# Patient Record
Sex: FEMALE | Race: WHITE | Employment: OTHER | URBAN - METROPOLITAN AREA
[De-identification: names, ages, dates, MRNs, and addresses within clinical notes are randomized per-mention and may not be internally consistent; named-entity substitution may affect disease eponyms.]

---

## 2017-06-06 ENCOUNTER — HOSPITAL ENCOUNTER (OUTPATIENT)
Dept: MRI IMAGING | Age: 82
Discharge: HOME | End: 2017-06-06
Attending: FAMILY MEDICINE
Payer: MEDICARE

## 2017-06-06 DIAGNOSIS — M54.5 CHRONIC RIGHT-SIDED LOW BACK PAIN, WITH SCIATICA PRESENCE UNSPECIFIED: ICD-10-CM

## 2017-06-06 DIAGNOSIS — G89.29 CHRONIC RIGHT-SIDED LOW BACK PAIN, WITH SCIATICA PRESENCE UNSPECIFIED: ICD-10-CM

## 2017-06-06 PROCEDURE — 72148 MRI LUMBAR SPINE W/O DYE: CPT

## 2017-06-08 NOTE — PROGRESS NOTES
Notified results, verbalizes understanding. Has been receiving injections from Dr. Delia Santacruz, but these are no longer helping. Per Dr. Ijeoma Valdes: Олег Ojeda second opinion with Dr Dolores Del Valle or [facility]. Referral ordered.

## 2017-06-08 NOTE — PROGRESS NOTES
Per Dr. Billie Nageotte: Notify lots of arthritis and narrowing that pinches nerve. Follow up with POA. [facility]

## 2017-08-03 PROBLEM — M54.41 CHRONIC RIGHT-SIDED LOW BACK PAIN WITH RIGHT-SIDED SCIATICA: Status: ACTIVE | Noted: 2017-08-03

## 2017-08-03 PROBLEM — M47.817 FACET ARTHROPATHY, LUMBOSACRAL: Status: ACTIVE | Noted: 2017-08-03

## 2017-08-03 PROBLEM — M54.16 BILATERAL LUMBAR RADICULOPATHY: Status: ACTIVE | Noted: 2017-08-03

## 2017-08-03 PROBLEM — G89.29 CHRONIC RIGHT-SIDED LOW BACK PAIN WITH RIGHT-SIDED SCIATICA: Status: ACTIVE | Noted: 2017-08-03

## 2017-08-03 PROBLEM — I73.9 CLAUDICATION (HCC): Status: ACTIVE | Noted: 2017-08-03

## 2017-08-08 ENCOUNTER — HOSPITAL ENCOUNTER (OUTPATIENT)
Dept: PHYSICAL THERAPY | Age: 82
Discharge: HOME | End: 2017-08-08
Attending: HOSPITALIST
Payer: MEDICARE

## 2017-08-08 DIAGNOSIS — M47.817 FACET ARTHROPATHY, LUMBOSACRAL: ICD-10-CM

## 2017-08-08 DIAGNOSIS — M54.16 BILATERAL LUMBAR RADICULOPATHY: ICD-10-CM

## 2017-08-08 DIAGNOSIS — M54.41 CHRONIC RIGHT-SIDED LOW BACK PAIN WITH RIGHT-SIDED SCIATICA: ICD-10-CM

## 2017-08-08 DIAGNOSIS — I73.9 CLAUDICATION (HCC): ICD-10-CM

## 2017-08-08 DIAGNOSIS — G89.29 CHRONIC RIGHT-SIDED LOW BACK PAIN WITH RIGHT-SIDED SCIATICA: ICD-10-CM

## 2017-08-08 PROCEDURE — 97162 PT EVAL MOD COMPLEX 30 MIN: CPT

## 2017-08-08 PROCEDURE — G8978 MOBILITY CURRENT STATUS: HCPCS

## 2017-08-08 PROCEDURE — 97110 THERAPEUTIC EXERCISES: CPT

## 2017-08-08 PROCEDURE — G8979 MOBILITY GOAL STATUS: HCPCS

## 2017-08-08 NOTE — PROGRESS NOTES
Ambulatory/Rehab Services H2 Model Falls Risk Assessment    Risk Factor Pts. ·   Confusion/Disorientation/Impulsivity  []    4 ·   Symptomatic Depression  []   2 ·   Altered Elimination  []   1 ·   Dizziness/Vertigo  []   1 ·   Gender (Male)  []   1 ·   Any administered antiepileptics (anticonvulsants):  []   2 ·   Any administered benzodiazepines:  []   1 ·   Visual Impairment (specify):  []   1 ·   Portable Oxygen Use  []   1 ·   Orthostatic ? BP  []   1 ·   History of Recent Falls (within 3 mos.)  []   5     Ability to Rise from Chair (choose one) Pts. ·   Ability to rise in a single movement  []   0 ·   Pushes up, successful in one attempt  []   1 ·   Multiple attempts, but successful  [x]   3 ·   Unable to rise without assistance  []   4   Total: (5 or greater = High Risk) 3     Falls Prevention Plan:   []                Physical Limitations to Exercise (specify):   []                Mobility Assistance Device (type):   []                Exercise/Equipment Adaptation (specify):    ©2010 [facility] of [address] Patent #[id number].  Federal Law prohibits the replication, distribution or use without written permission from [facility] [employer]

## 2017-08-08 NOTE — PROGRESS NOTES
Therapy Center at [facility] Therapy   [address], [address]  [phone]   [phone]    Pato Escobar  : 1922       OUTPATIENT PHYSICAL THERAPY:Initial Assessment 2017    ICD-10: Treatment Diagnosis: difficulty walking R 26.2, back pain M54.5  Precautions/Allergies:   Latex, natural rubber   Fall Risk Score: 3 (? 5 = High Risk)  MD Orders: Eval and treat MEDICAL/REFERRING DIAGNOSIS:  Bilateral lumbar radiculopathy [M54.16]  Claudication ([address] [I73.9]  Chronic right-sided low back pain with right-sided sciatica [M54.41, G89.29]  Facet arthropathy, lumbosacral [M12.88]   DATE OF ONSET: chronic  REFERRING PHYSICIAN: Dewain Simmonds, MD  RETURN PHYSICIAN APPOINTMENT: as needed     INITIAL ASSESSMENT:  Ms. Alpesh Moreno presents with chronic back pain due to  Scoliosis, DDD and spondylosis. She has had difficulty with her back for years and has received prior injections by Dr. Kathy Cid which have helped for a while. She does report her pain can extend into her legs and she has had worsening time with her walking and balance. Dr. Chaka Anderson has instructed pt to begin using a cane in order to decrease her fall risk. Pt would like to work on her overall strength and balance so she feels more comfortable with standing and walking and to help improve her overall mobility. PROBLEM LIST (Impacting functional limitations):  1. Decreased Strength  2. Decreased ADL/Functional Activities  3. Decreased Ambulation Ability/Technique  4. Decreased Balance  5. Increased Pain  6. Decreased Activity Tolerance  7. Decreased Flexibility/Joint Mobility INTERVENTIONS PLANNED:  1. Balance Exercise  2. Gait Training  3. Heat  4. Home Exercise Program (HEP)  5. Range of Motion (ROM)  6. Therapeutic Exercise/Strengthening   TREATMENT PLAN:  Effective Dates: 17 TO 10/17/17.   Frequency/Duration: 2 times a week for 10 weeks and upon reassessment,  will adjust frequency and duration as progress indicates. GOALS: (Goals have been discussed and agreed upon with patient.)  Short-Term Functional Goals: Time Frame: 5 weeks  1. Establish independent HEP with no cueing. 2. Pt will be able to report zero falls. 3. Pt will increase strength by 1/2 grade in order to rise from chair with more ease and less attempts. Discharge Goals: Time Frame: 10 weeks  1. Improve score on Oswestry by at least 5-8 points for improved ADL function. 2. Pt will be able to demonstrate improved gait mechanics with use of cane and proper use of cane. 3. Pt will be able to maintain a more erect standing or sitting posture to limit kyphosis. 4. Pt will be able to walk for at least 10 minutes prior to sitting down. Rehabilitation Potential For Stated Goals: [address] therapy, I certify that the treatment plan above will be carried out by a therapist or under their direction. Thank you for this referral,  Jovany Carrera PT     Referring Physician Signature: Randall Alexander MD              Date                    The information in this section was collected on 8/8/17 (except where otherwise noted). HISTORY:   History of Present Injury/Illness (Reason for Referral):  Ms. Amanuel Wayne , 80years of age, presents with chronic back pain due to  Scoliosis, DDD and spondylosis. She has had difficulty with her back for years and has received prior injections by Dr. Chandra Cowden which have helped for a while. She does report her pain can extend into her legs and she has had worsening time with her walking and balance. Dr. Tequila Hernandez has instructed pt to begin using a cane in order to decrease her fall risk. Pt would like to work on her overall strength and balance so she feels more comfortable with standing and walking and to help improve her overall mobility. Past Medical History/Comorbidities:   Ms. Amanuel Wayne  has a past medical history of Anxiety;  Arthritis; Carotid artery stenosis; Depression; [place] (hard of hearing); Hypoglycemia; Leg fracture, left (2009); PUD (peptic ulcer disease); Venous insufficiency; Venous stasis of lower extremity; and Venous ulcer of leg ([facility] [address] Ms. Kristen Lama  has a past surgical history that includes colonoscopy (2008); cataract removal (Bilateral); tonsillectomy; total knee arthroplasty (Left, 2000); total hip arthroplasty (Right, 2008); and orthopaedic (Left, 2009). Social History/Living Environment:     Pt lives independently in a one level home. She continues to drive  Prior Level of Function/Work/Activity:  Pt is retired. Dominant Side:         RIGHT    Current Medications:       Current Outpatient Prescriptions:     gabapentin (NEURONTIN) 100 mg capsule, Take 1 Cap by mouth See Admin Instructions. Take 1 tablet Po QHS for 1 week. If no excess sedation then increase to 2 tablets PO QHS. , Disp: 60 Cap, Rfl: 3    cyanocobalamin 1,000 mcg tablet, Take 1,000 mcg by mouth daily. , Disp: , Rfl:     meloxicam (MOBIC) 7.5 mg tablet, Take 1-2 Tabs by mouth daily as needed for Pain., Disp: 60 Tab, Rfl: 5    escitalopram oxalate (LEXAPRO) 10 mg tablet, Take 1 Tab by mouth daily. , Disp: 60 Tab, Rfl: 5    CALCIUM POLYCARBOPHIL (FIBERCON PO), Take  by mouth., Disp: , Rfl:     Bifidobacterium Infantis (ALIGN) 4 mg cap, Take  by mouth., Disp: , Rfl:     vitamin a-vitamin c-vit e-min (OCUVITE) tablet, Take 1 Tab by mouth daily. , Disp: , Rfl:     aspirin delayed-release 81 mg tablet, Take 81 mg by mouth daily. R Carotid stenosis/ venous insuff/ venous stasis LLE- per Dr Emperatriz Recio continue Aspirin 81 mg- Left msg for Ania Larkin MA to Dr Elizabeth Taylor with this information 2/6/15 @ 1502, Disp: , Rfl:    Date Last Reviewed:  8/8/2017     Number of Personal Factors/Comorbidities that affect the Plan of Care: 1-2: MODERATE COMPLEXITY   EXAMINATION:   Palpation:          Noted spine curve to the right due to scoliosis    ROM:        bilateral lumbar Rot limited 30%.   All other motions WFL. Pain was noted with FB, BB and bilateral SB. With R SB pain felt on right and with left SB pain felt on the left                                    Strength:     R hip flex 4/5, abd 4-/5, quad 4/5, HS 3/5         L hip flex 3/5, HS 3-/5, quad 4-/5, abd 3/5            Special Tests: poor dynamic balance  Neurological Screen: na  Balance:  poor dynamic standing balance   Body Structures Involved:  1. Bones  2. Joints  3. Muscles  4. Ligaments Body Functions Affected:  1. Sensory/Pain  2. Neuromusculoskeletal  3. Movement Related Activities and Participation Affected:  1. Mobility  2. Self Care  3. Domestic Life   Number of elements (examined above) that affect the Plan of Care: 3: MODERATE COMPLEXITY   CLINICAL PRESENTATION:   Presentation: Evolving clinical presentation with changing clinical characteristics: MODERATE COMPLEXITY   CLINICAL DECISION MAKING:   Outcome Measure: Tool Used: Modified Oswestry Low Back Pain Questionnaire  Score:  Initial: 25/50  Most Recent: X/50 (Date: -- )   Interpretation of Score: Each section is scored on a 0-5 scale, 5 representing the greatest disability. The scores of each section are added together for a total score of 50. Score 0 1-10 11-20 21-30 31-40 41-49 50   Modifier CH CI CJ CK CL CM CN     ? Mobility - Walking and Moving Around:     - CURRENT STATUS: CK - 40%-59% impaired, limited or restricted    - GOAL STATUS: CJ - 20%-39% impaired, limited or restricted    - D/C STATUS:  ---------------To be determined---------------      Medical Necessity:   · Patient is expected to demonstrate progress in strength, range of motion and balance to improve her balance and ability to ambulate and care for herself in the home. Reason for Services/Other Comments:  · Patient continues to require skilled intervention due to ongoing pain in the back and legs that is affecting her balance and ability to walk and care for herself .    Use of outcome tool(s) and clinical judgement create a POC that gives a: Questionable prediction of patient's progress: MODERATE COMPLEXITY            TREATMENT:   (In addition to Assessment/Re-Assessment sessions the following treatments were rendered)  Pre-treatment Symptoms/Complaints:  Pt reporting increased back pain and difficulty with walking and standing. Pain: Initial: Pain Intensity 1: 7  Post Session:  7/10     THERAPEUTIC EXERCISE: (10 minutes):  Exercises per grid below to improve mobility, strength and balance. Required minimal verbal cues to promote proper body mechanics. Progressed resistance, range and repetitions as indicated. Nu-step level 0 x 7 min seat 8  Seated LAQ x 10  Seated marching x 12  Gait training with cane x 30 feet  Evaluation (  xx   ):    Manual Therapy (    na  ): na    Therapeutic Modalities:na    HEP: As above; handouts given to patient for all exercises. Treatment/Session Assessment:    · Response to Treatment:  Pt reporting no significant increases in pain with use of nu-step or gait training. She did require some verbal cues for proper use of cane and will need continued practice to ensure her safety. She will benefit from lower back exercises, general leg strengthening, gait training and balance training to help her overall mobility in the home and community. · Compliance with Program/Exercises: Will assess as treatment progresses. · Recommendations/Intent for next treatment session: \"Next visit will focus on advancements to more challenging activities\".   Total Treatment Duration: 50 min  PT Patient Time In/Time Out  Time In: 1115  Time Out: 1205  Treatment number [address] Julia Johnson PT

## 2017-08-15 ENCOUNTER — HOSPITAL ENCOUNTER (OUTPATIENT)
Dept: PHYSICAL THERAPY | Age: 82
Discharge: HOME | End: 2017-08-15
Attending: HOSPITALIST
Payer: MEDICARE

## 2017-08-15 PROCEDURE — 97110 THERAPEUTIC EXERCISES: CPT

## 2017-08-15 NOTE — PROGRESS NOTES
Therapy Center at [facility] Therapy   [address], [address]  [phone]   [phone]    Carlo Vincent  : 1922       OUTPATIENT PHYSICAL THERAPY:Daily Note 8/15/2017    ICD-10: Treatment Diagnosis: difficulty walking R 26.2, back pain M54.5  Precautions/Allergies:   Latex, natural rubber   Fall Risk Score: 3 (? 5 = High Risk)  MD Orders: Eval and treat MEDICAL/REFERRING DIAGNOSIS:  Radiculopathy, lumbar region [M54.16]  Peripheral vascular disease, unspecified [I73.9]  Lumbago with sciatica, right side [M54.41]  Other chronic pain [G89.29]  Other specific arthropathies, not elsewhere classified, other specified site [M12.88]   DATE OF ONSET: chronic  REFERRING PHYSICIAN: Trenton Lugo MD  RETURN PHYSICIAN APPOINTMENT: as needed     INITIAL ASSESSMENT:  Ms. Sun Dorado presents with chronic back pain due to  Scoliosis, DDD and spondylosis. She has had difficulty with her back for years and has received prior injections by Dr. Luisa Botello which have helped for a while. She does report her pain can extend into her legs and she has had worsening time with her walking and balance. Dr. Abhishek Sena has instructed pt to begin using a cane in order to decrease her fall risk. Pt would like to work on her overall strength and balance so she feels more comfortable with standing and walking and to help improve her overall mobility. PROBLEM LIST (Impacting functional limitations):  1. Decreased Strength  2. Decreased ADL/Functional Activities  3. Decreased Ambulation Ability/Technique  4. Decreased Balance  5. Increased Pain  6. Decreased Activity Tolerance  7. Decreased Flexibility/Joint Mobility INTERVENTIONS PLANNED:  1. Balance Exercise  2. Gait Training  3. Heat  4. Home Exercise Program (HEP)  5. Range of Motion (ROM)  6. Therapeutic Exercise/Strengthening   TREATMENT PLAN:  Effective Dates: 17 TO 10/17/17.   Frequency/Duration: 2 times a week for 10 weeks and upon reassessment,  will adjust frequency and duration as progress indicates. GOALS: (Goals have been discussed and agreed upon with patient.)  Short-Term Functional Goals: Time Frame: 5 weeks  1. Establish independent HEP with no cueing. 2. Pt will be able to report zero falls. 3. Pt will increase strength by 1/2 grade in order to rise from chair with more ease and less attempts. Discharge Goals: Time Frame: 10 weeks  1. Improve score on Oswestry by at least 5-8 points for improved ADL function. 2. Pt will be able to demonstrate improved gait mechanics with use of cane and proper use of cane. 3. Pt will be able to maintain a more erect standing or sitting posture to limit kyphosis. 4. Pt will be able to walk for at least 10 minutes prior to sitting down. Rehabilitation Potential For Stated Goals: [address] therapy, I certify that the treatment plan above will be carried out by a therapist or under their direction. Thank you for this referral,  Saul Valencia PT     Referring Physician Signature: Ronnie Roberto MD              Date                    The information in this section was collected on 8/8/17 (except where otherwise noted). HISTORY:   History of Present Injury/Illness (Reason for Referral):  Ms. Giuliano Stephens , 80years of age, presents with chronic back pain due to  Scoliosis, DDD and spondylosis. She has had difficulty with her back for years and has received prior injections by Dr. Fredy Ramirez which have helped for a while. She does report her pain can extend into her legs and she has had worsening time with her walking and balance. Dr. Marilyn Bass has instructed pt to begin using a cane in order to decrease her fall risk. Pt would like to work on her overall strength and balance so she feels more comfortable with standing and walking and to help improve her overall mobility.   Past Medical History/Comorbidities:   Ms. Giuliano Stephens  has a past medical history of Anxiety; Arthritis; Carotid artery stenosis; Depression; [place] (hard of hearing); Hypoglycemia; Leg fracture, left (2009); PUD (peptic ulcer disease); Venous insufficiency; Venous stasis of lower extremity; and Venous ulcer of leg ([facility] [address] Ms. Orvis Osgood  has a past surgical history that includes colonoscopy (2008); cataract removal (Bilateral); tonsillectomy; total knee arthroplasty (Left, 2000); total hip arthroplasty (Right, 2008); and orthopaedic (Left, 2009). Social History/Living Environment:     Pt lives independently in a one level home. She continues to drive  Prior Level of Function/Work/Activity:  Pt is retired. Dominant Side:         RIGHT    Current Medications:       Current Outpatient Prescriptions:     gabapentin (NEURONTIN) 100 mg capsule, Take 1 Cap by mouth See Admin Instructions. Take 1 tablet Po QHS for 1 week. If no excess sedation then increase to 2 tablets PO QHS. , Disp: 60 Cap, Rfl: 3    cyanocobalamin 1,000 mcg tablet, Take 1,000 mcg by mouth daily. , Disp: , Rfl:     meloxicam (MOBIC) 7.5 mg tablet, Take 1-2 Tabs by mouth daily as needed for Pain., Disp: 60 Tab, Rfl: 5    escitalopram oxalate (LEXAPRO) 10 mg tablet, Take 1 Tab by mouth daily. , Disp: 60 Tab, Rfl: 5    CALCIUM POLYCARBOPHIL (FIBERCON PO), Take  by mouth., Disp: , Rfl:     Bifidobacterium Infantis (ALIGN) 4 mg cap, Take  by mouth., Disp: , Rfl:     vitamin a-vitamin c-vit e-min (OCUVITE) tablet, Take 1 Tab by mouth daily. , Disp: , Rfl:     aspirin delayed-release 81 mg tablet, Take 81 mg by mouth daily.  R Carotid stenosis/ venous insuff/ venous stasis LLE- per Dr Katia Bal continue Aspirin 81 mg- Left msg for Heladio Alfaro MA to Dr Shilo Lawrence with this information 2/6/15 @ 1502, Disp: , Rfl:    Date Last Reviewed:  8/15/2017     Number of Personal Factors/Comorbidities that affect the Plan of Care: 1-2: MODERATE COMPLEXITY   EXAMINATION:   Palpation:          Noted spine curve to the right due to scoliosis    ROM: bilateral lumbar Rot limited 30%. All other motions WFL. Pain was noted with FB, BB and bilateral SB. With R SB pain felt on right and with left SB pain felt on the left                                    Strength:     R hip flex 4/5, abd 4-/5, quad 4/5, HS 3/5         L hip flex 3/5, HS 3-/5, quad 4-/5, abd 3/5            Special Tests: poor dynamic balance  Neurological Screen: na  Balance:  poor dynamic standing balance   Body Structures Involved:  1. Bones  2. Joints  3. Muscles  4. Ligaments Body Functions Affected:  1. Sensory/Pain  2. Neuromusculoskeletal  3. Movement Related Activities and Participation Affected:  1. Mobility  2. Self Care  3. Domestic Life   Number of elements (examined above) that affect the Plan of Care: 3: MODERATE COMPLEXITY   CLINICAL PRESENTATION:   Presentation: Evolving clinical presentation with changing clinical characteristics: MODERATE COMPLEXITY   CLINICAL DECISION MAKING:   Outcome Measure: Tool Used: Modified Oswestry Low Back Pain Questionnaire  Score:  Initial: 25/50  Most Recent: X/50 (Date: -- )   Interpretation of Score: Each section is scored on a 0-5 scale, 5 representing the greatest disability. The scores of each section are added together for a total score of 50. Score 0 1-10 11-20 21-30 31-40 41-49 50   Modifier CH CI CJ CK CL CM CN     ? Mobility - Walking and Moving Around:     - CURRENT STATUS: CK - 40%-59% impaired, limited or restricted    - GOAL STATUS: CJ - 20%-39% impaired, limited or restricted    - D/C STATUS:  ---------------To be determined---------------      Medical Necessity:   · Patient is expected to demonstrate progress in strength, range of motion and balance to improve her balance and ability to ambulate and care for herself in the home.   Reason for Services/Other Comments:  · Patient continues to require skilled intervention due to ongoing pain in the back and legs that is affecting her balance and ability to walk and care for herself . Use of outcome tool(s) and clinical judgement create a POC that gives a: Questionable prediction of patient's progress: MODERATE COMPLEXITY            TREATMENT:   (In addition to Assessment/Re-Assessment sessions the following treatments were rendered)  Pre-treatment Symptoms/Complaints:  Pt reports hurting all over today. She states that her legs and back are really stiff. Pain: Initial: Pain Intensity 1: 7  Post Session:  5/10     THERAPEUTIC EXERCISE: (60 minutes):  Exercises per grid below to improve mobility, strength and balance. Required minimal verbal cues to promote proper body mechanics. Progressed resistance, range and repetitions as indicated. Nu-step level 0 x 10 min seat 8  Seated LAQ x 10  Seated marching x 12  Gait training with cane x 150 feet   Gait training outside on sloped area to simulate driveway with cane  SAQ x 20  Sit to stand 2 x 10  Lateral walking with hand held assistance x 10  Step ups on 4 inch step x 20  Sit to sands with exercises ball raises x 10   Standing weightshift on to single leg with hand held assistance x 10    Evaluation :    Manual Therapy (    na  ): na    Therapeutic Modalities:na    HEP: As directed. Treatment/Session Assessment:    · Response to Treatment:  Pt tolerated treatment with mild discomfort. She continues to  require some verbal cues for proper use of cane and will need conptinued practice to ensure her safety. Patient seemed to have a better understanding of the importance of her using a cane on a consistence bases. Patient is very motivated and eager to improve her overall strength and mobility. · Compliance with Program/Exercises: Will assess as treatment progresses. · Recommendations/Intent for next treatment session: \"Next visit will focus on advancements to more challenging activities\".   Total Treatment Duration: 60 min  PT Patient Time In/Time Out  Time In: 1000  Time Out: 1100  [facility]

## 2017-08-18 ENCOUNTER — HOSPITAL ENCOUNTER (OUTPATIENT)
Dept: PHYSICAL THERAPY | Age: 82
Discharge: HOME | End: 2017-08-18
Attending: HOSPITALIST
Payer: MEDICARE

## 2017-08-18 PROCEDURE — 97110 THERAPEUTIC EXERCISES: CPT

## 2017-08-20 NOTE — PROGRESS NOTES
[facility] [place] [address] [facility]   [address] [place] [address], [address] [address]  [phone]   [phone]    Clay Crawford  : 1922       OUTPATIENT PHYSICAL THERAPY:Daily Note 17    ICD-10: Treatment Diagnosis: difficulty walking R 26.2, back pain M54.5  Precautions/Allergies:   Latex, natural rubber   Fall Risk Score: 3 (? 5 = High Risk)  MD Orders: Eval and treat MEDICAL/REFERRING DIAGNOSIS:  Radiculopathy, lumbar region [M54.16]  Peripheral vascular disease, unspecified [I73.9]  Lumbago with sciatica, right side [M54.41]  Other chronic pain [G89.29]  Other specific arthropathies, not elsewhere classified, other specified site [M12.88]   DATE OF ONSET: chronic  REFERRING PHYSICIAN: Diane Jean MD  RETURN PHYSICIAN APPOINTMENT: as needed     INITIAL ASSESSMENT:  Ms. Enriqueta Wadsworth presents with chronic back pain due to  Scoliosis, DDD and spondylosis. She has had difficulty with her back for years and has received prior injections by Dr. Nahun Montana which have helped for a while. She does report her pain can extend into her legs and she has had worsening time with her walking and balance. Dr. Randall Henao has instructed pt to begin using a cane in order to decrease her fall risk. Pt would like to work on her overall strength and balance so she feels more comfortable with standing and walking and to help improve her overall mobility. PROBLEM LIST (Impacting functional limitations):  1. Decreased Strength  2. Decreased ADL/Functional Activities  3. Decreased Ambulation Ability/Technique  4. Decreased Balance  5. Increased Pain  6. Decreased Activity Tolerance  7. Decreased Flexibility/Joint Mobility INTERVENTIONS PLANNED:  1. Balance Exercise  2. Gait Training  3. Heat  4. Home Exercise Program (HEP)  5. Range of Motion (ROM)  6. Therapeutic Exercise/Strengthening   TREATMENT PLAN:  Effective Dates: 17 TO 10/17/17.   Frequency/Duration: 2 times a week for 10 weeks and upon reassessment,  will adjust frequency and duration as progress indicates. GOALS: (Goals have been discussed and agreed upon with patient.)  Short-Term Functional Goals: Time Frame: 5 weeks  1. Establish independent HEP with no cueing. 2. Pt will be able to report zero falls. 3. Pt will increase strength by 1/2 grade in order to rise from chair with more ease and less attempts. Discharge Goals: Time Frame: 10 weeks  1. Improve score on Oswestry by at least 5-8 points for improved ADL function. 2. Pt will be able to demonstrate improved gait mechanics with use of cane and proper use of cane. 3. Pt will be able to maintain a more erect standing or sitting posture to limit kyphosis. 4. Pt will be able to walk for at least 10 minutes prior to sitting down. Rehabilitation Potential For Stated Goals: [address] [facility], I certify that the treatment plan above will be carried out by a therapist or under their direction. Thank you for this referral,  Ariane Blank PT     Referring Physician Signature: Dionicio Tao MD              Date                    The information in this section was collected on 8/8/17 (except where otherwise noted). HISTORY:   History of Present Injury/Illness (Reason for Referral):  Ms. Rohit Walters , 80years of age, presents with chronic back pain due to  Scoliosis, DDD and spondylosis. She has had difficulty with her back for years and has received prior injections by Dr. Cholo Patino which have helped for a while. She does report her pain can extend into her legs and she has had worsening time with her walking and balance. Dr. Elham Matt has instructed pt to begin using a cane in order to decrease her fall risk. Pt would like to work on her overall strength and balance so she feels more comfortable with standing and walking and to help improve her overall mobility.   Past Medical History/Comorbidities:   Ms. Rohit Walters  has a past medical history of Anxiety; Arthritis; Carotid artery stenosis; Depression; [place] (hard of hearing); Hypoglycemia; Leg fracture, left (2009); PUD (peptic ulcer disease); Venous insufficiency; Venous stasis of lower extremity; and Venous ulcer of leg ([address] Ms. Robin Alejandra  has a past surgical history that includes colonoscopy (2008); cataract removal (Bilateral); tonsillectomy; total knee arthroplasty (Left, 2000); total hip arthroplasty (Right, 2008); and orthopaedic (Left, 2009). Social History/Living Environment:     Pt lives independently in a one level home. She continues to drive  Prior Level of Function/Work/Activity:  Pt is retired. Dominant Side:         RIGHT    Current Medications:       Current Outpatient Prescriptions:     gabapentin (NEURONTIN) 100 mg capsule, Take 1 Cap by mouth See Admin Instructions. Take 1 tablet Po QHS for 1 week. If no excess sedation then increase to 2 tablets PO QHS. , Disp: 60 Cap, Rfl: 3    cyanocobalamin 1,000 mcg tablet, Take 1,000 mcg by mouth daily. , Disp: , Rfl:     meloxicam (MOBIC) 7.5 mg tablet, Take 1-2 Tabs by mouth daily as needed for Pain., Disp: 60 Tab, Rfl: 5    escitalopram oxalate (LEXAPRO) 10 mg tablet, Take 1 Tab by mouth daily. , Disp: 60 Tab, Rfl: 5    CALCIUM POLYCARBOPHIL (FIBERCON PO), Take  by mouth., Disp: , Rfl:     Bifidobacterium Infantis (ALIGN) 4 mg cap, Take  by mouth., Disp: , Rfl:     vitamin a-vitamin c-vit e-min (OCUVITE) tablet, Take 1 Tab by mouth daily. , Disp: , Rfl:     aspirin delayed-release 81 mg tablet, Take 81 mg by mouth daily.  R Carotid stenosis/ venous insuff/ venous stasis LLE- per Dr Kesha Arreola continue Aspirin 81 mg- Left msg for Jaun Reid MA to Dr Molina Novoa with this information 2/6/15 @ 3192, Disp: , Rfl:    Date Last Reviewed:  8/18/2017     Number of Personal Factors/Comorbidities that affect the Plan of Care: 1-2: MODERATE COMPLEXITY   EXAMINATION:   Palpation:          Noted spine curve to the right due to scoliosis    ROM: bilateral lumbar Rot limited 30%. All other motions WFL. Pain was noted with FB, BB and bilateral SB. With R SB pain felt on right and with left SB pain felt on the left                                    Strength:     R hip flex 4/5, abd 4-/5, quad 4/5, HS 3/5         L hip flex 3/5, HS 3-/5, quad 4-/5, abd 3/5            Special Tests: poor dynamic balance  Neurological Screen: na  Balance:  poor dynamic standing balance   Body Structures Involved:  1. Bones  2. Joints  3. Muscles  4. Ligaments Body Functions Affected:  1. Sensory/Pain  2. Neuromusculoskeletal  3. Movement Related Activities and Participation Affected:  1. Mobility  2. Self Care  3. Domestic Life   Number of elements (examined above) that affect the Plan of Care: 3: MODERATE COMPLEXITY   CLINICAL PRESENTATION:   Presentation: Evolving clinical presentation with changing clinical characteristics: MODERATE COMPLEXITY   CLINICAL DECISION MAKING:   Outcome Measure: Tool Used: Modified Oswestry Low Back Pain Questionnaire  Score:  Initial: 25/50  Most Recent: X/50 (Date: -- )   Interpretation of Score: Each section is scored on a 0-5 scale, 5 representing the greatest disability. The scores of each section are added together for a total score of 50. Score 0 1-10 11-20 21-30 31-40 41-49 50   Modifier CH CI CJ CK CL CM CN     ? Mobility - Walking and Moving Around:     - CURRENT STATUS: CK - 40%-59% impaired, limited or restricted    - GOAL STATUS: CJ - 20%-39% impaired, limited or restricted    - D/C STATUS:  ---------------To be determined---------------      Medical Necessity:   · Patient is expected to demonstrate progress in strength, range of motion and balance to improve her balance and ability to ambulate and care for herself in the home.   Reason for Services/Other Comments:  · Patient continues to require skilled intervention due to ongoing pain in the back and legs that is affecting her balance and ability to walk and care for herself . Use of outcome tool(s) and clinical judgement create a POC that gives a: Questionable prediction of patient's progress: MODERATE COMPLEXITY            TREATMENT:   (In addition to Assessment/Re-Assessment sessions the following treatments were rendered)  Pre-treatment Symptoms/Complaints:  Pt reports no increased pain. Pain: Initial: Pain Intensity 1: 4  Post Session:  4/10     THERAPEUTIC EXERCISE: (60 minutes):  Exercises per grid below to improve mobility, strength and balance. Required minimal verbal cues to promote proper body mechanics. Progressed resistance, range and repetitions as indicated. Nu-step level 0 x 10 min seat 8  Seated LAQ 5# x 20  Standing marching x 20  Gait training with cane x 150 feet   Gait training outside on sloped area to simulate driveway with cane-held  Sit to stand 1 x 10  Lateral walking with hand held assistance x 10-held  Step ups on 6 inch step x 20  Sit to sands with exercises ball raises x 10 -held  Standing weightshift on to single leg with hand held assistance x 10-held  Standing bilateral hip abduction 2# x 20  Standing bilateral hip extension 2# x 20    Manual Therapy (    na  ): na    Therapeutic Modalities:na    HEP: As directed. Treatment/Session Assessment:    · Response to Treatment:  Pt reporting no increased pain with treatment. She has been using a cane at home and is improving with her ability to use the cane. She remains sore and stiff due to OA. She still has increased difficulty with standing up from a chair. We are working on this as well as overall strengthening and balance exercises. · Compliance with Program/Exercises: Will assess as treatment progresses. · Recommendations/Intent for next treatment session: \"Next visit will focus on advancements to more challenging activities\".   Total Treatment Duration: 60 min  PT Patient Time In/Time Out  Time In: 0100  Time Out: 0200  Treatment number 1100 [facility], PT

## 2017-08-22 ENCOUNTER — HOSPITAL ENCOUNTER (OUTPATIENT)
Dept: PHYSICAL THERAPY | Age: 82
Discharge: HOME | End: 2017-08-22
Attending: HOSPITALIST
Payer: MEDICARE

## 2017-08-22 PROCEDURE — 97110 THERAPEUTIC EXERCISES: CPT

## 2017-08-23 NOTE — PROGRESS NOTES
[facility] [place] [address] Therapy   [address], [address]  [phone]   [phone]    Tipton Joseph  : 1922       OUTPATIENT PHYSICAL THERAPY:Daily Note 17    ICD-10: Treatment Diagnosis: difficulty walking R 26.2, back pain M54.5  Precautions/Allergies:   Latex, natural rubber   Fall Risk Score: 3 (? 5 = High Risk)  MD Orders: Eval and treat MEDICAL/REFERRING DIAGNOSIS:  Radiculopathy, lumbar region [M54.16]  Peripheral vascular disease, unspecified [I73.9]  Lumbago with sciatica, right side [M54.41]  Other chronic pain [G89.29]  Other specific arthropathies, not elsewhere classified, other specified site [M12.88]   DATE OF ONSET: chronic  REFERRING PHYSICIAN: Geri Yepez MD  RETURN PHYSICIAN APPOINTMENT: as needed     INITIAL ASSESSMENT:  Ms. Hilary Lu presents with chronic back pain due to  Scoliosis, DDD and spondylosis. She has had difficulty with her back for years and has received prior injections by Dr. Minda Moreira which have helped for a while. She does report her pain can extend into her legs and she has had worsening time with her walking and balance. Dr. Gertrude Lang has instructed pt to begin using a cane in order to decrease her fall risk. Pt would like to work on her overall strength and balance so she feels more comfortable with standing and walking and to help improve her overall mobility. PROBLEM LIST (Impacting functional limitations):  1. Decreased Strength  2. Decreased ADL/Functional Activities  3. Decreased Ambulation Ability/Technique  4. Decreased Balance  5. Increased Pain  6. Decreased Activity Tolerance  7. Decreased Flexibility/Joint Mobility INTERVENTIONS PLANNED:  1. Balance Exercise  2. Gait Training  3. Heat  4. Home Exercise Program (HEP)  5. Range of Motion (ROM)  6. Therapeutic Exercise/Strengthening   TREATMENT PLAN:  Effective Dates: 17 TO 10/17/17.   Frequency/Duration: 2 times a week for 10 weeks and upon reassessment,  will adjust frequency and duration as progress indicates. GOALS: (Goals have been discussed and agreed upon with patient.)  Short-Term Functional Goals: Time Frame: 5 weeks  1. Establish independent HEP with no cueing. 2. Pt will be able to report zero falls. 3. Pt will increase strength by 1/2 grade in order to rise from chair with more ease and less attempts. Discharge Goals: Time Frame: 10 weeks  1. Improve score on Oswestry by at least 5-8 points for improved ADL function. 2. Pt will be able to demonstrate improved gait mechanics with use of cane and proper use of cane. 3. Pt will be able to maintain a more erect standing or sitting posture to limit kyphosis. 4. Pt will be able to walk for at least 10 minutes prior to sitting down. Rehabilitation Potential For Stated Goals: [address], I certify that the treatment plan above will be carried out by a therapist or under their direction. Thank you for this referral,  Kiley Leon, PT     Referring Physician Signature: Keon Melendez MD              Date                    The information in this section was collected on 8/8/17 (except where otherwise noted). HISTORY:   History of Present Injury/Illness (Reason for Referral):  Ms. Eliot Mahoney , 80years of age, presents with chronic back pain due to  Scoliosis, DDD and spondylosis. She has had difficulty with her back for years and has received prior injections by Dr. Kelvin Dhaliwal which have helped for a while. She does report her pain can extend into her legs and she has had worsening time with her walking and balance. Dr. Trip Trejo has instructed pt to begin using a cane in order to decrease her fall risk. Pt would like to work on her overall strength and balance so she feels more comfortable with standing and walking and to help improve her overall mobility.   Past Medical History/Comorbidities:   Ms. Eliot Mahoney  has a past medical history of Anxiety; Arthritis; Carotid artery stenosis; Depression; [place] (hard of hearing); Hypoglycemia; Leg fracture, left (2009); PUD (peptic ulcer disease); Venous insufficiency; Venous stasis of lower extremity; and Venous ulcer of leg ([facility] [address] Ms. Denver Fishman  has a past surgical history that includes colonoscopy (2008); cataract removal (Bilateral); tonsillectomy; total knee arthroplasty (Left, 2000); total hip arthroplasty (Right, 2008); and orthopaedic (Left, 2009). Social History/Living Environment:     Pt lives independently in a one level home. She continues to drive  Prior Level of Function/Work/Activity:  Pt is retired. Dominant Side:         RIGHT    Current Medications:       Current Outpatient Prescriptions:     gabapentin (NEURONTIN) 100 mg capsule, Take 1 Cap by mouth See Admin Instructions. Take 1 tablet Po QHS for 1 week. If no excess sedation then increase to 2 tablets PO QHS. , Disp: 60 Cap, Rfl: 3    cyanocobalamin 1,000 mcg tablet, Take 1,000 mcg by mouth daily. , Disp: , Rfl:     meloxicam (MOBIC) 7.5 mg tablet, Take 1-2 Tabs by mouth daily as needed for Pain., Disp: 60 Tab, Rfl: 5    escitalopram oxalate (LEXAPRO) 10 mg tablet, Take 1 Tab by mouth daily. , Disp: 60 Tab, Rfl: 5    CALCIUM POLYCARBOPHIL (FIBERCON PO), Take  by mouth., Disp: , Rfl:     Bifidobacterium Infantis (ALIGN) 4 mg cap, Take  by mouth., Disp: , Rfl:     vitamin a-vitamin c-vit e-min (OCUVITE) tablet, Take 1 Tab by mouth daily. , Disp: , Rfl:     aspirin delayed-release 81 mg tablet, Take 81 mg by mouth daily.  R Carotid stenosis/ venous insuff/ venous stasis LLE- per Dr Wood Ag continue Aspirin 81 mg- Left msg for Nixon Mar MA to Dr Dom[address] Fuelling with this information 2/6/15 @ 5062, Disp: , Rfl:    Date Last Reviewed:  8/22/2017     Number of Personal Factors/Comorbidities that affect the Plan of Care: 1-2: MODERATE COMPLEXITY   EXAMINATION:   Palpation:          Noted spine curve to the right due to scoliosis    ROM: bilateral lumbar Rot limited 30%. All other motions WFL. Pain was noted with FB, BB and bilateral SB. With R SB pain felt on right and with left SB pain felt on the left                                    Strength:     R hip flex 4/5, abd 4-/5, quad 4/5, HS 3/5         L hip flex 3/5, HS 3-/5, quad 4-/5, abd 3/5            Special Tests: poor dynamic balance  Neurological Screen: na  Balance:  poor dynamic standing balance   Body Structures Involved:  1. Bones  2. Joints  3. Muscles  4. Ligaments Body Functions Affected:  1. Sensory/Pain  2. Neuromusculoskeletal  3. Movement Related Activities and Participation Affected:  1. Mobility  2. Self Care  3. Domestic Life   Number of elements (examined above) that affect the Plan of Care: 3: MODERATE COMPLEXITY   CLINICAL PRESENTATION:   Presentation: Evolving clinical presentation with changing clinical characteristics: MODERATE COMPLEXITY   CLINICAL DECISION MAKING:   Outcome Measure: Tool Used: Modified Oswestry Low Back Pain Questionnaire  Score:  Initial: 25/50  Most Recent: X/50 (Date: -- )   Interpretation of Score: Each section is scored on a 0-5 scale, 5 representing the greatest disability. The scores of each section are added together for a total score of 50. Score 0 1-10 11-20 21-30 31-40 41-49 50   Modifier CH CI CJ CK CL CM CN     ? Mobility - Walking and Moving Around:     - CURRENT STATUS: CK - 40%-59% impaired, limited or restricted    - GOAL STATUS: CJ - 20%-39% impaired, limited or restricted    - D/C STATUS:  ---------------To be determined---------------      Medical Necessity:   · Patient is expected to demonstrate progress in strength, range of motion and balance to improve her balance and ability to ambulate and care for herself in the home.   Reason for Services/Other Comments:  · Patient continues to require skilled intervention due to ongoing pain in the back and legs that is affecting her balance and ability to walk and care for herself . Use of outcome tool(s) and clinical judgement create a POC that gives a: Questionable prediction of patient's progress: MODERATE COMPLEXITY            TREATMENT:   (In addition to Assessment/Re-Assessment sessions the following treatments were rendered)  Pre-treatment Symptoms/Complaints:  Pt reports no increased pain. Pain: Initial: Pain Intensity 1: 4  Post Session:  4/10     THERAPEUTIC EXERCISE: (60 minutes):  Exercises per grid below to improve mobility, strength and balance. Required minimal verbal cues to promote proper body mechanics. Progressed resistance, range and repetitions as indicated. Nu-step level 0 x 10 min seat 8  Seated LAQ 5# x 25  Standing marching x 30  Marching on airex x 30  rhomberg stance eyes closed on airex x 1 min  Gait training with cane x 200 feet   Gait training outside on sloped area to simulate driveway with cane-held  Sit to stand 1 x 10  Lateral walking with hand held assistance x 10-held  Step ups on 6 inch step x 20 with use of cane and no handrails  Sit to sands with exercises ball raises x 10 -held  Standing weightshift on to single leg with hand held assistance x 10-held  Standing bilateral hip abduction 3# x 20  Standing bilateral hip extension 3# x 20    Manual Therapy (    na  ): na    Therapeutic Modalities:na    HEP: As directed. Treatment/Session Assessment:    · Response to Treatment:  Pt reporting no increased pain with treatment today. She is having some difficulty with her left knee. She did report having it replaced roughly 17 years ago. She is concerned that the prosthetic may be worn down. Pt was able to progress through her exercises with several seated rest breaks. She has good stamina for someone her age. She is ambulating with more ease with the cane although her dena is slow. Today, she did repetitive 6 inch step-ups with use of cane and no handrails and stand by assistance. · Compliance with Program/Exercises:  Will assess as treatment progresses. · Recommendations/Intent for next treatment session: \"Next visit will focus on advancements to more challenging activities\".   Total Treatment Duration: 60 min  PT Patient Time In/Time Out  Time In: 1245  Time Out: 0145  Treatment number 701 Rohan Win, EMMA

## 2017-08-24 ENCOUNTER — HOSPITAL ENCOUNTER (OUTPATIENT)
Dept: PHYSICAL THERAPY | Age: 82
Discharge: HOME | End: 2017-08-24
Attending: HOSPITALIST
Payer: MEDICARE

## 2017-08-24 NOTE — PROGRESS NOTES
[facility] Therapy   [address], [facility] [address]  [phone]   [phone]    OUTPATIENT DAILY NOTE    NAME/AGE/GENDER: Jesse Valdovinos is a 80 y.o. female. DATE: 8/24/2017    Patient cancelled appointment today due to illness. Will plan to follow up on next scheduled visit.     Varun Liao, PT

## 2017-08-29 ENCOUNTER — HOSPITAL ENCOUNTER (OUTPATIENT)
Dept: PHYSICAL THERAPY | Age: 82
Discharge: HOME | End: 2017-08-29
Attending: HOSPITALIST
Payer: MEDICARE

## 2017-08-29 PROCEDURE — 97110 THERAPEUTIC EXERCISES: CPT

## 2017-08-29 NOTE — PROGRESS NOTES
[facility]   [address], [address]  [phone]   [phone]    Carlo Rojoptrudy  : 1922       OUTPATIENT PHYSICAL THERAPY:Daily Note 17    ICD-10: Treatment Diagnosis: difficulty walking R 26.2, back pain M54.5  Precautions/Allergies:   Latex, natural rubber   Fall Risk Score: 3 (? 5 = High Risk)  MD Orders: Eval and treat MEDICAL/REFERRING DIAGNOSIS:  Radiculopathy, lumbar region [M54.16]  Peripheral vascular disease, unspecified [I73.9]  Lumbago with sciatica, right side [M54.41]  Other chronic pain [G89.29]  Other specific arthropathies, not elsewhere classified, other specified site [M12.88]   DATE OF ONSET: chronic  REFERRING PHYSICIAN: Trenton Lugo MD  RETURN PHYSICIAN APPOINTMENT: as needed     INITIAL ASSESSMENT:  Ms. Sun Dorado presents with chronic back pain due to  Scoliosis, DDD and spondylosis. She has had difficulty with her back for years and has received prior injections by Dr. Luisa Botello which have helped for a while. She does report her pain can extend into her legs and she has had worsening time with her walking and balance. Dr. Abhishek Sena has instructed pt to begin using a cane in order to decrease her fall risk. Pt would like to work on her overall strength and balance so she feels more comfortable with standing and walking and to help improve her overall mobility. PROBLEM LIST (Impacting functional limitations):  1. Decreased Strength  2. Decreased ADL/Functional Activities  3. Decreased Ambulation Ability/Technique  4. Decreased Balance  5. Increased Pain  6. Decreased Activity Tolerance  7. Decreased Flexibility/Joint Mobility INTERVENTIONS PLANNED:  1. Balance Exercise  2. Gait Training  3. Heat  4. Home Exercise Program (HEP)  5. Range of Motion (ROM)  6. Therapeutic Exercise/Strengthening   TREATMENT PLAN:  Effective Dates: 17 TO 10/17/17.   Frequency/Duration: 2 times a week for 10 weeks and upon reassessment,  will adjust frequency and duration as progress indicates. GOALS: (Goals have been discussed and agreed upon with patient.)  Short-Term Functional Goals: Time Frame: 5 weeks  1. Establish independent HEP with no cueing. 2. Pt will be able to report zero falls. 3. Pt will increase strength by 1/2 grade in order to rise from chair with more ease and less attempts. Discharge Goals: Time Frame: 10 weeks  1. Improve score on Oswestry by at least 5-8 points for improved ADL function. 2. Pt will be able to demonstrate improved gait mechanics with use of cane and proper use of cane. 3. Pt will be able to maintain a more erect standing or sitting posture to limit kyphosis. 4. Pt will be able to walk for at least 10 minutes prior to sitting down. Rehabilitation Potential For Stated Goals: [address], I certify that the treatment plan above will be carried out by a therapist or under their direction. Thank you for this referral,  Jovany Carrera PT     Referring Physician Signature: Randall Alexander MD              Date                    The information in this section was collected on 8/8/17 (except where otherwise noted). HISTORY:   History of Present Injury/Illness (Reason for Referral):  Ms. Amanuel Wayne , 80years of age, presents with chronic back pain due to  Scoliosis, DDD and spondylosis. She has had difficulty with her back for years and has received prior injections by Dr. Chandra Cowden which have helped for a while. She does report her pain can extend into her legs and she has had worsening time with her walking and balance. Dr. Tequila Hernandez has instructed pt to begin using a cane in order to decrease her fall risk. Pt would like to work on her overall strength and balance so she feels more comfortable with standing and walking and to help improve her overall mobility.   Past Medical History/Comorbidities:   Ms. Amanuel Wayne  has a past medical history of Anxiety; Arthritis; Carotid artery stenosis; Depression; [place] (hard of hearing); Hypoglycemia; Leg fracture, left (2009); PUD (peptic ulcer disease); Venous insufficiency; Venous stasis of lower extremity; and Venous ulcer of leg ([facility] [address] Ms. Cammy Kwok  has a past surgical history that includes colonoscopy (2008); cataract removal (Bilateral); tonsillectomy; total knee arthroplasty (Left, 2000); total hip arthroplasty (Right, 2008); and orthopaedic (Left, 2009). Social History/Living Environment:     Pt lives independently in a one level home. She continues to drive  Prior Level of Function/Work/Activity:  Pt is retired. Dominant Side:         RIGHT    Current Medications:       Current Outpatient Prescriptions:     gabapentin (NEURONTIN) 100 mg capsule, Take 1 Cap by mouth See Admin Instructions. Take 1 tablet Po QHS for 1 week. If no excess sedation then increase to 2 tablets PO QHS. , Disp: 60 Cap, Rfl: 3    cyanocobalamin 1,000 mcg tablet, Take 1,000 mcg by mouth daily. , Disp: , Rfl:     meloxicam (MOBIC) 7.5 mg tablet, Take 1-2 Tabs by mouth daily as needed for Pain., Disp: 60 Tab, Rfl: 5    escitalopram oxalate (LEXAPRO) 10 mg tablet, Take 1 Tab by mouth daily. , Disp: 60 Tab, Rfl: 5    CALCIUM POLYCARBOPHIL (FIBERCON PO), Take  by mouth., Disp: , Rfl:     Bifidobacterium Infantis (ALIGN) 4 mg cap, Take  by mouth., Disp: , Rfl:     vitamin a-vitamin c-vit e-min (OCUVITE) tablet, Take 1 Tab by mouth daily. , Disp: , Rfl:     aspirin delayed-release 81 mg tablet, Take 81 mg by mouth daily.  R Carotid stenosis/ venous insuff/ venous stasis LLE- per Dr Zacarias Phalen continue Aspirin 81 mg- Left msg for Miguel Burton MA to Dr Bisi Krishnan with this information 2/6/15 @ 1502, Disp: , Rfl:    Date Last Reviewed:  8/29/2017     Number of Personal Factors/Comorbidities that affect the Plan of Care: 1-2: MODERATE COMPLEXITY   EXAMINATION:   Palpation:          Noted spine curve to the right due to scoliosis    ROM: bilateral lumbar Rot limited 30%. All other motions WFL. Pain was noted with FB, BB and bilateral SB. With R SB pain felt on right and with left SB pain felt on the left                                    Strength:     R hip flex 4/5, abd 4-/5, quad 4/5, HS 3/5         L hip flex 3/5, HS 3-/5, quad 4-/5, abd 3/5            Special Tests: poor dynamic balance  Neurological Screen: na  Balance:  poor dynamic standing balance   Body Structures Involved:  1. Bones  2. Joints  3. Muscles  4. Ligaments Body Functions Affected:  1. Sensory/Pain  2. Neuromusculoskeletal  3. Movement Related Activities and Participation Affected:  1. Mobility  2. Self Care  3. Domestic Life   Number of elements (examined above) that affect the Plan of Care: 3: MODERATE COMPLEXITY   CLINICAL PRESENTATION:   Presentation: Evolving clinical presentation with changing clinical characteristics: MODERATE COMPLEXITY   CLINICAL DECISION MAKING:   Outcome Measure: Tool Used: Modified Oswestry Low Back Pain Questionnaire  Score:  Initial: 25/50  Most Recent: X/50 (Date: -- )   Interpretation of Score: Each section is scored on a 0-5 scale, 5 representing the greatest disability. The scores of each section are added together for a total score of 50. Score 0 1-10 11-20 21-30 31-40 41-49 50   Modifier CH CI CJ CK CL CM CN     ? Mobility - Walking and Moving Around:     - CURRENT STATUS: CK - 40%-59% impaired, limited or restricted    - GOAL STATUS: CJ - 20%-39% impaired, limited or restricted    - D/C STATUS:  ---------------To be determined---------------      Medical Necessity:   · Patient is expected to demonstrate progress in strength, range of motion and balance to improve her balance and ability to ambulate and care for herself in the home.   Reason for Services/Other Comments:  · Patient continues to require skilled intervention due to ongoing pain in the back and legs that is affecting her balance and ability to walk and care for herself . Use of outcome tool(s) and clinical judgement create a POC that gives a: Questionable prediction of patient's progress: MODERATE COMPLEXITY            TREATMENT:   (In addition to Assessment/Re-Assessment sessions the following treatments were rendered)  Pre-treatment Symptoms/Complaints:  Pt reports no increased pain. Pain: Initial: Pain Intensity 1: 4  Post Session:  4/10     THERAPEUTIC EXERCISE: (55 minutes):  Exercises per grid below to improve mobility, strength and balance. Required minimal verbal cues to promote proper body mechanics. Progressed resistance, range and repetitions as indicated. Nu-step level 0 x 10 min seat 8  Seated LAQ 5# x 25  Standing marching x 30  Marching on airex x 30  rhomberg stance eyes closed on airex x 1 min  Gait training with cane x 200 feet   Gait training outside on sloped area to simulate driveway with cane-held  Sit to stand 1 x 10  Lateral walking with hand held assistance x 10-  Step ups on 6 inch step x 20 with use of cane and no handrails  Sit to sands with exercises ball raises x 10 -held  Standing weightshift on to single leg with hand held assistance x 10-held  Standing bilateral hip abduction 3# x 20  Standing bilateral hip extension 3# x 20  scap retract green TB x 30  Manual Therapy (    na  ): na    Therapeutic Modalities:na    HEP: As directed. Treatment/Session Assessment:    · Response to Treatment:  Pt reporting her left knee is hurting more. The knee was replaced 17 years ago and she is fearful the device may be worn out. She brought in her own cane today, but it was non-adjustable for her height. She will try and get an adjustable cane when able. She walked with a slow dena today and did have some increased right thigh and left knee pain. She is progressing well overall and feels as though therapy is helping her. · Compliance with Program/Exercises: Will assess as treatment progresses.   · Recommendations/Intent for next treatment session: \"Next visit will focus on advancements to more challenging activities\".   Total Treatment Duration: 55 min  PT Patient Time In/Time Out  Time In: 1120  Time Out: 1215  Treatment number [address], PT

## 2017-08-31 ENCOUNTER — HOSPITAL ENCOUNTER (OUTPATIENT)
Dept: PHYSICAL THERAPY | Age: 82
Discharge: HOME | End: 2017-08-31
Attending: HOSPITALIST
Payer: MEDICARE

## 2017-08-31 PROCEDURE — 97110 THERAPEUTIC EXERCISES: CPT

## 2017-08-31 NOTE — PROGRESS NOTES
[facility] at [facility]   [address], [address]  [phone]   [phone]    Layne Sanches  : 1922       OUTPATIENT PHYSICAL THERAPY:Daily Note     ICD-10: Treatment Diagnosis: difficulty walking R 26.2, back pain M54.5  Precautions/Allergies:   Latex, natural rubber   Fall Risk Score: 3 (? 5 = High Risk)  MD Orders: Eval and treat MEDICAL/REFERRING DIAGNOSIS:  Radiculopathy, lumbar region [M54.16]  Peripheral vascular disease, unspecified [I73.9]  Lumbago with sciatica, right side [M54.41]  Other chronic pain [G89.29]  Other specific arthropathies, not elsewhere classified, other specified site [M12.88]   DATE OF ONSET: chronic  REFERRING PHYSICIAN: Atif Woodard MD  RETURN PHYSICIAN APPOINTMENT: as needed     INITIAL ASSESSMENT:  Ms. Elmer Sage presents with chronic back pain due to  Scoliosis, DDD and spondylosis. She has had difficulty with her back for years and has received prior injections by Dr. Leia Porras which have helped for a while. She does report her pain can extend into her legs and she has had worsening time with her walking and balance. Dr. Carrie Meek has instructed pt to begin using a cane in order to decrease her fall risk. Pt would like to work on her overall strength and balance so she feels more comfortable with standing and walking and to help improve her overall mobility. PROBLEM LIST (Impacting functional limitations):  1. Decreased Strength  2. Decreased ADL/Functional Activities  3. Decreased Ambulation Ability/Technique  4. Decreased Balance  5. Increased Pain  6. Decreased Activity Tolerance  7. Decreased Flexibility/Joint Mobility INTERVENTIONS PLANNED:  1. Balance Exercise  2. Gait Training  3. Heat  4. Home Exercise Program (HEP)  5. Range of Motion (ROM)  6. Therapeutic Exercise/Strengthening   TREATMENT PLAN:  Effective Dates: 17 TO 10/17/17.   Frequency/Duration: 2 times a week for 10 weeks and upon reassessment,  will adjust frequency and duration as progress indicates. GOALS: (Goals have been discussed and agreed upon with patient.)  Short-Term Functional Goals: Time Frame: 5 weeks  1. Establish independent HEP with no cueing. 2. Pt will be able to report zero falls. 3. Pt will increase strength by 1/2 grade in order to rise from chair with more ease and less attempts. Discharge Goals: Time Frame: 10 weeks  1. Improve score on Oswestry by at least 5-8 points for improved ADL function. 2. Pt will be able to demonstrate improved gait mechanics with use of cane and proper use of cane. 3. Pt will be able to maintain a more erect standing or sitting posture to limit kyphosis. 4. Pt will be able to walk for at least 10 minutes prior to sitting down. Rehabilitation Potential For Stated Goals: [address] therapy, I certify that the treatment plan above will be carried out by a therapist or under their direction. Thank you for this referral,  Kiley Leon, PT     Referring Physician Signature: Keon Melendez MD              Date                    The information in this section was collected on 8/8/17 (except where otherwise noted). HISTORY:   History of Present Injury/Illness (Reason for Referral):  Ms. Eliot Mahoney , 80years of age, presents with chronic back pain due to  Scoliosis, DDD and spondylosis. She has had difficulty with her back for years and has received prior injections by Dr. Kelvin Dhaliwal which have helped for a while. She does report her pain can extend into her legs and she has had worsening time with her walking and balance. Dr. Trip Trejo has instructed pt to begin using a cane in order to decrease her fall risk. Pt would like to work on her overall strength and balance so she feels more comfortable with standing and walking and to help improve her overall mobility.   Past Medical History/Comorbidities:   Ms. Eliot Mahoney  has a past medical history of Anxiety; Arthritis; Carotid artery stenosis; Depression; [place] (hard of hearing); Hypoglycemia; Leg fracture, left (2009); PUD (peptic ulcer disease); Venous insufficiency; Venous stasis of lower extremity; and Venous ulcer of leg ([facility] [address] Ms. Tash Jeffries  has a past surgical history that includes colonoscopy (2008); cataract removal (Bilateral); tonsillectomy; total knee arthroplasty (Left, 2000); total hip arthroplasty (Right, 2008); and orthopaedic (Left, 2009). Social History/Living Environment:     Pt lives independently in a one level home. She continues to drive  Prior Level of Function/Work/Activity:  Pt is retired. Dominant Side:         RIGHT    Current Medications:       Current Outpatient Prescriptions:     gabapentin (NEURONTIN) 100 mg capsule, Take 1 Cap by mouth See Admin Instructions. Take 1 tablet Po QHS for 1 week. If no excess sedation then increase to 2 tablets PO QHS. , Disp: 60 Cap, Rfl: 3    cyanocobalamin 1,000 mcg tablet, Take 1,000 mcg by mouth daily. , Disp: , Rfl:     meloxicam (MOBIC) 7.5 mg tablet, Take 1-2 Tabs by mouth daily as needed for Pain., Disp: 60 Tab, Rfl: 5    escitalopram oxalate (LEXAPRO) 10 mg tablet, Take 1 Tab by mouth daily. , Disp: 60 Tab, Rfl: 5    CALCIUM POLYCARBOPHIL (FIBERCON PO), Take  by mouth., Disp: , Rfl:     Bifidobacterium Infantis (ALIGN) 4 mg cap, Take  by mouth., Disp: , Rfl:     vitamin a-vitamin c-vit e-min (OCUVITE) tablet, Take 1 Tab by mouth daily. , Disp: , Rfl:     aspirin delayed-release 81 mg tablet, Take 81 mg by mouth daily.  R Carotid stenosis/ venous insuff/ venous stasis LLE- per Dr Layne Chavez continue Aspirin 81 mg- Left msg for Cindy Perry MA to Dr Richie Rene with this information 2/6/15 @ 0572, Disp: , Rfl:    Date Last Reviewed:  8/31/2017     Number of Personal Factors/Comorbidities that affect the Plan of Care: 1-2: MODERATE COMPLEXITY   EXAMINATION:   Palpation:          Noted spine curve to the right due to scoliosis    ROM: bilateral lumbar Rot limited 30%. All other motions WFL. Pain was noted with FB, BB and bilateral SB. With R SB pain felt on right and with left SB pain felt on the left                                    Strength:     R hip flex 4/5, abd 4-/5, quad 4/5, HS 3/5         L hip flex 3/5, HS 3-/5, quad 4-/5, abd 3/5            Special Tests: poor dynamic balance  Neurological Screen: na  Balance:  poor dynamic standing balance   Body Structures Involved:  1. Bones  2. Joints  3. Muscles  4. Ligaments Body Functions Affected:  1. Sensory/Pain  2. Neuromusculoskeletal  3. Movement Related Activities and Participation Affected:  1. Mobility  2. Self Care  3. Domestic Life   Number of elements (examined above) that affect the Plan of Care: 3: MODERATE COMPLEXITY   CLINICAL PRESENTATION:   Presentation: Evolving clinical presentation with changing clinical characteristics: MODERATE COMPLEXITY   CLINICAL DECISION MAKING:   Outcome Measure: Tool Used: Modified Oswestry Low Back Pain Questionnaire  Score:  Initial: 25/50  Most Recent: X/50 (Date: -- )   Interpretation of Score: Each section is scored on a 0-5 scale, 5 representing the greatest disability. The scores of each section are added together for a total score of 50. Score 0 1-10 11-20 21-30 31-40 41-49 50   Modifier CH CI CJ CK CL CM CN     ? Mobility - Walking and Moving Around:     - CURRENT STATUS: CK - 40%-59% impaired, limited or restricted    - GOAL STATUS: CJ - 20%-39% impaired, limited or restricted    - D/C STATUS:  ---------------To be determined---------------      Medical Necessity:   · Patient is expected to demonstrate progress in strength, range of motion and balance to improve her balance and ability to ambulate and care for herself in the home.   Reason for Services/Other Comments:  · Patient continues to require skilled intervention due to ongoing pain in the back and legs that is affecting her balance and ability to walk and care for herself . Use of outcome tool(s) and clinical judgement create a POC that gives a: Questionable prediction of patient's progress: MODERATE COMPLEXITY            TREATMENT:   (In addition to Assessment/Re-Assessment sessions the following treatments were rendered)  Pre-treatment Symptoms/Complaints:  Pt reports being sore upon awakening this morning. Pain: Initial: Pain Intensity 1: 4  Post Session:  3/10     THERAPEUTIC EXERCISE: (55 minutes):  Exercises per grid below to improve mobility, strength and balance. Required minimal verbal cues to promote proper body mechanics. Progressed resistance, range and repetitions as indicated. Nu-step level 0 x 10 min seat 8  Seated LAQ 5# x 30  Standing marching x 30  Marching on airex x 30-held  rhomberg stance eyes closed on airex x 1 min-held  Gait training with cane x 200 feet   Gait training outside on sloped area to simulate driveway with cane-held  Sit to stand 1 x 10  Lateral walking with hand held assistance x 10-  Step ups on 6 inch step x 20   Sit to sands with exercises ball raises x 10 -held  Standing weightshift on to single leg with hand held assistance x 10-held  Standing bilateral hip abduction 3# x 20  Standing bilateral hip extension 3# x 20  scap retract green TB x 30  Manual Therapy (    na  ): na    Therapeutic Modalities:na    HEP: As directed. Treatment/Session Assessment:    · Response to Treatment:  Pt having some mild catching in the right hip region. Light STM was performed during one of patient's seated rest breaks. She often needs to rest occasionally due to fatigue. She was able to walk in the clinic with her cane some mild unsteadiness noted today. She still required only SBA. Pt reports having widespread soreness at times, but knows she struggles with OA. She continues to report therapy helps her feel better. · Compliance with Program/Exercises: Will assess as treatment progresses.   · Recommendations/Intent for next treatment session: \"Next visit will focus on advancements to more challenging activities\".   Total Treatment Duration: 55 min  PT Patient Time In/Time Out  Time In: 1110  Time Out: 1205  Treatment number [address], PT

## 2017-09-06 ENCOUNTER — HOSPITAL ENCOUNTER (OUTPATIENT)
Dept: PHYSICAL THERAPY | Age: 82
Discharge: HOME | End: 2017-09-06
Attending: HOSPITALIST
Payer: MEDICARE

## 2017-09-06 PROCEDURE — 97110 THERAPEUTIC EXERCISES: CPT

## 2017-09-06 NOTE — PROGRESS NOTES
[facility]rapy Center at [facility]   [address], [address]  [phone]   [phone]    Layne Sanches  : 1922       OUTPATIENT PHYSICAL THERAPY:Daily Note     ICD-10: Treatment Diagnosis: difficulty walking R 26.2, back pain M54.5  Precautions/Allergies:   Latex, natural rubber   Fall Risk Score: 3 (? 5 = High Risk)  MD Orders: Eval and treat MEDICAL/REFERRING DIAGNOSIS:  Radiculopathy, lumbar region [M54.16]  Peripheral vascular disease, unspecified [I73.9]  Lumbago with sciatica, right side [M54.41]  Other chronic pain [G89.29]  Other specific arthropathies, not elsewhere classified, other specified site [M12.88]   DATE OF ONSET: chronic  REFERRING PHYSICIAN: Atif Woodard MD  RETURN PHYSICIAN APPOINTMENT: as needed     INITIAL ASSESSMENT:  Ms. Elmer Sage presents with chronic back pain due to  Scoliosis, DDD and spondylosis. She has had difficulty with her back for years and has received prior injections by Dr. Leia Porras which have helped for a while. She does report her pain can extend into her legs and she has had worsening time with her walking and balance. Dr. Carrie Meek has instructed pt to begin using a cane in order to decrease her fall risk. Pt would like to work on her overall strength and balance so she feels more comfortable with standing and walking and to help improve her overall mobility. PROBLEM LIST (Impacting functional limitations):  1. Decreased Strength  2. Decreased ADL/Functional Activities  3. Decreased Ambulation Ability/Technique  4. Decreased Balance  5. Increased Pain  6. Decreased Activity Tolerance  7. Decreased Flexibility/Joint Mobility INTERVENTIONS PLANNED:  1. Balance Exercise  2. Gait Training  3. Heat  4. Home Exercise Program (HEP)  5. Range of Motion (ROM)  6. Therapeutic Exercise/Strengthening   TREATMENT PLAN:  Effective Dates: 17 TO 10/17/17.   Frequency/Duration: 2 times a week for 10 weeks and upon reassessment,  will adjust frequency and duration as progress indicates. GOALS: (Goals have been discussed and agreed upon with patient.)  Short-Term Functional Goals: Time Frame: 5 weeks  1. Establish independent HEP with no cueing. 2. Pt will be able to report zero falls. 3. Pt will increase strength by 1/2 grade in order to rise from chair with more ease and less attempts. Discharge Goals: Time Frame: 10 weeks  1. Improve score on Oswestry by at least 5-8 points for improved ADL function. 2. Pt will be able to demonstrate improved gait mechanics with use of cane and proper use of cane. 3. Pt will be able to maintain a more erect standing or sitting posture to limit kyphosis. 4. Pt will be able to walk for at least 10 minutes prior to sitting down. Rehabilitation Potential For Stated Goals: [address] therapy, I certify that the treatment plan above will be carried out by a therapist or under their direction. Thank you for this referral,  Minda Ramirez PT     Referring Physician Signature: Mary Mandujano MD              Date                    The information in this section was collected on 8/8/17 (except where otherwise noted). HISTORY:   History of Present Injury/Illness (Reason for Referral):  Ms. Aleksander Garcia , 80years of age, presents with chronic back pain due to  Scoliosis, DDD and spondylosis. She has had difficulty with her back for years and has received prior injections by Dr. Kayy Sandy which have helped for a while. She does report her pain can extend into her legs and she has had worsening time with her walking and balance. Dr. Roxana Sawant has instructed pt to begin using a cane in order to decrease her fall risk. Pt would like to work on her overall strength and balance so she feels more comfortable with standing and walking and to help improve her overall mobility.   Past Medical History/Comorbidities:   Ms. Aleksander Garcia  has a past medical history of Anxiety; Arthritis; Carotid artery stenosis; Depression; [place] (hard of hearing); Hypoglycemia; Leg fracture, left (2009); PUD (peptic ulcer disease); Venous insufficiency; Venous stasis of lower extremity; and Venous ulcer of leg ([facility] [address] Ms. Ayla Ritter  has a past surgical history that includes colonoscopy (2008); cataract removal (Bilateral); tonsillectomy; total knee arthroplasty (Left, 2000); total hip arthroplasty (Right, 2008); and orthopaedic (Left, 2009). Social History/Living Environment:     Pt lives independently in a one level home. She continues to drive  Prior Level of Function/Work/Activity:  Pt is retired. Dominant Side:         RIGHT    Current Medications:       Current Outpatient Prescriptions:     gabapentin (NEURONTIN) 100 mg capsule, Take 1 Cap by mouth See Admin Instructions. Take 1 tablet Po QHS for 1 week. If no excess sedation then increase to 2 tablets PO QHS. , Disp: 60 Cap, Rfl: 3    cyanocobalamin 1,000 mcg tablet, Take 1,000 mcg by mouth daily. , Disp: , Rfl:     meloxicam (MOBIC) 7.5 mg tablet, Take 1-2 Tabs by mouth daily as needed for Pain., Disp: 60 Tab, Rfl: 5    escitalopram oxalate (LEXAPRO) 10 mg tablet, Take 1 Tab by mouth daily. , Disp: 60 Tab, Rfl: 5    CALCIUM POLYCARBOPHIL (FIBERCON PO), Take  by mouth., Disp: , Rfl:     Bifidobacterium Infantis (ALIGN) 4 mg cap, Take  by mouth., Disp: , Rfl:     vitamin a-vitamin c-vit e-min (OCUVITE) tablet, Take 1 Tab by mouth daily. , Disp: , Rfl:     aspirin delayed-release 81 mg tablet, Take 81 mg by mouth daily.  R Carotid stenosis/ venous insuff/ venous stasis LLE- per Dr Searcy Leyden continue Aspirin 81 mg- Left msg for Tierra Bloom MA to Dr Adalberto Duenas with this information 2/6/15 @ 1502, Disp: , Rfl:    Date Last Reviewed:  9/6/2017     Number of Personal Factors/Comorbidities that affect the Plan of Care: 1-2: MODERATE COMPLEXITY   EXAMINATION:   Palpation:          Noted spine curve to the right due to scoliosis    ROM: bilateral lumbar Rot limited 30%. All other motions WFL. Pain was noted with FB, BB and bilateral SB. With R SB pain felt on right and with left SB pain felt on the left                                    Strength:     R hip flex 4/5, abd 4-/5, quad 4/5, HS 3/5         L hip flex 3/5, HS 3-/5, quad 4-/5, abd 3/5            Special Tests: poor dynamic balance  Neurological Screen: na  Balance:  poor dynamic standing balance   Body Structures Involved:  1. Bones  2. Joints  3. Muscles  4. Ligaments Body Functions Affected:  1. Sensory/Pain  2. Neuromusculoskeletal  3. Movement Related Activities and Participation Affected:  1. Mobility  2. Self Care  3. Domestic Life   Number of elements (examined above) that affect the Plan of Care: 3: MODERATE COMPLEXITY   CLINICAL PRESENTATION:   Presentation: Evolving clinical presentation with changing clinical characteristics: MODERATE COMPLEXITY   CLINICAL DECISION MAKING:   Outcome Measure: Tool Used: Modified Oswestry Low Back Pain Questionnaire  Score:  Initial: 25/50  Most Recent: X/50 (Date: -- )   Interpretation of Score: Each section is scored on a 0-5 scale, 5 representing the greatest disability. The scores of each section are added together for a total score of 50. Score 0 1-10 11-20 21-30 31-40 41-49 50   Modifier CH CI CJ CK CL CM CN     ? Mobility - Walking and Moving Around:     - CURRENT STATUS: CK - 40%-59% impaired, limited or restricted    - GOAL STATUS: CJ - 20%-39% impaired, limited or restricted    - D/C STATUS:  ---------------To be determined---------------      Medical Necessity:   · Patient is expected to demonstrate progress in strength, range of motion and balance to improve her balance and ability to ambulate and care for herself in the home.   Reason for Services/Other Comments:  · Patient continues to require skilled intervention due to ongoing pain in the back and legs that is affecting her balance and ability to walk and care for herself . Use of outcome tool(s) and clinical judgement create a POC that gives a: Questionable prediction of patient's progress: MODERATE COMPLEXITY            TREATMENT:   (In addition to Assessment/Re-Assessment sessions the following treatments were rendered)  Pre-treatment Symptoms/Complaints:  Pt reports knee have been hurting more today and yesterday. Pain: Initial: Pain Intensity 1: 6  Post Session:  5/10     THERAPEUTIC EXERCISE: (60 minutes):  Exercises per grid below to improve mobility, strength and balance. Required minimal verbal cues to promote proper body mechanics. Progressed resistance, range and repetitions as indicated. Nu-step level 0 x 10 min seat 8  Seated LAQ 5# x 30  Standing marching x 30  Marching on airex x 30-held  rhomberg stance eyes closed on airex x 1 min-held  Gait training with cane x 200 feet   Gait training outside on sloped area to simulate driveway with cane-held  Sit to stand 1 x 10  Lateral walking with hand held assistance x 15  Step ups on 6 inch step x 20   Sit to sands with exercises ball raises x 10 -held  Standing weightshift on to single leg with hand held assistance x 10  Standing bilateral hip abduction 3# x 20  Standing bilateral hip extension 3# x 20  scap retract green TB x 30  Manual Therapy (    na  ): na    Therapeutic Modalities:na    HEP: As directed. Treatment/Session Assessment:    · Response to Treatment:  Pt complained about knee pain today. She states that walking has been a lot more difficult in the last few days. She often needs to rest occasionally due to fatigue. Patient had an episode with her blood sugar during treatment today, but it seemed to get better after she was given some candy and orange juice. Patient indicated that this has happened a few times lately and she was instructed to contact her doctor to be checked. · Compliance with Program/Exercises: Will assess as treatment progresses.   · Recommendations/Intent for next treatment session: \"Next visit will focus on advancements to more challenging activities\".   Total Treatment Duration: 60 min  PT Patient Time In/Time Out  Time In: 1000  Time Out: 1100  Treatment number [address] [place], [place]

## 2017-09-12 ENCOUNTER — HOSPITAL ENCOUNTER (OUTPATIENT)
Dept: PHYSICAL THERAPY | Age: 82
Discharge: HOME | End: 2017-09-12
Attending: HOSPITALIST
Payer: MEDICARE

## 2017-09-12 NOTE — PROGRESS NOTES
Therapy Center at [address]   [address] [place] [address], [address]  [phone]   [phone]    OUTPATIENT DAILY NOTE    NAME/AGE/GENDER: Clement De Leon is a 80 y.o. female. DATE: 9/12/2017    Patient cancelled appointment today due to illness. Will plan to follow up on next scheduled visit.     Clarence Sandoval, PT

## 2017-09-14 ENCOUNTER — HOSPITAL ENCOUNTER (OUTPATIENT)
Dept: PHYSICAL THERAPY | Age: 82
Discharge: HOME | End: 2017-09-14
Attending: HOSPITALIST
Payer: MEDICARE

## 2017-09-14 PROCEDURE — G8979 MOBILITY GOAL STATUS: HCPCS

## 2017-09-14 PROCEDURE — G8978 MOBILITY CURRENT STATUS: HCPCS

## 2017-09-14 PROCEDURE — 97110 THERAPEUTIC EXERCISES: CPT

## 2017-09-14 NOTE — PROGRESS NOTES
Therapy Center at [facility] Therapy   [address], [address]  [phone]   [phone]    Layne Myron  : 1922       OUTPATIENT PHYSICAL THERAPY:Daily Note and Progress Report 17    ICD-10: Treatment Diagnosis: difficulty walking R 26.2, back pain M54.5  Precautions/Allergies:   Latex, natural rubber   Fall Risk Score: 3 (? 5 = High Risk)  MD Orders: Eval and treat MEDICAL/REFERRING DIAGNOSIS:  Radiculopathy, lumbar region [M54.16]  Peripheral vascular disease, unspecified [I73.9]  Lumbago with sciatica, right side [M54.41]  Other chronic pain [G89.29]  Other specific arthropathies, not elsewhere classified, other specified site [M12.88]   DATE OF ONSET: chronic  REFERRING PHYSICIAN: Atif Woodard MD  RETURN PHYSICIAN APPOINTMENT: as needed     INITIAL ASSESSMENT:  Ms. Elmer Sage presents with chronic back pain due to  Scoliosis, DDD and spondylosis. She has had difficulty with her back for years and has received prior injections by Dr. Leia Porras which have helped for a while. She does report her pain can extend into her legs and she has had worsening time with her walking and balance. Dr. Carrie Meek has instructed pt to begin using a cane in order to decrease her fall risk. Pt would like to work on her overall strength and balance so she feels more comfortable with standing and walking and to help improve her overall mobility. PROBLEM LIST (Impacting functional limitations):  1. Decreased Strength  2. Decreased ADL/Functional Activities  3. Decreased Ambulation Ability/Technique  4. Decreased Balance  5. Increased Pain  6. Decreased Activity Tolerance  7. Decreased Flexibility/Joint Mobility INTERVENTIONS PLANNED:  1. Balance Exercise  2. Gait Training  3. Heat  4. Home Exercise Program (HEP)  5. Range of Motion (ROM)  6. Therapeutic Exercise/Strengthening   TREATMENT PLAN:  Effective Dates: 17 TO 10/17/17.   Frequency/Duration: 2 times a week for 10 weeks and upon reassessment,  will adjust frequency and duration as progress indicates. GOALS: (Goals have been discussed and agreed upon with patient.)  Short-Term Functional Goals: Time Frame: 5 weeks  1. Establish independent HEP with no cueing.-met  2. Pt will be able to report zero falls. -met  3. Pt will increase strength by 1/2 grade in order to rise from chair with more ease and less attempts.-in progress  Discharge Goals: Time Frame: 10 weeks  1. Improve score on Oswestry by at least 5-8 points for improved ADL function. 2. Pt will be able to demonstrate improved gait mechanics with use of cane and proper use of cane.-in progress  3. Pt will be able to maintain a more erect standing or sitting posture to limit kyphosis. -in progress  4. Pt will be able to walk for at least 10 minutes prior to sitting down.-in progress  Rehabilitation Potential For Stated Goals: [facility] therapy, I certify that the treatment plan above will be carried out by a therapist or under their direction. Thank you for this referral,  Nancy Vanessa PT     Referring Physician Signature: Dewain Simmonds, MD              Date                    The information in this section was collected on 8/8/17 (except where otherwise noted). HISTORY:   History of Present Injury/Illness (Reason for Referral):  Ms. Alpesh Moreno , 80years of age, presents with chronic back pain due to  Scoliosis, DDD and spondylosis. She has had difficulty with her back for years and has received prior injections by  [facility] which have helped for a while. She does report her pain can extend into her legs and she has had worsening time with her walking and balance. Dr. Chaka Anderson has instructed pt to begin using a cane in order to decrease her fall risk.   Pt would like to work on her overall strength and balance so she feels more comfortable with standing and walking and to help improve her overall mobility. Past Medical History/Comorbidities:   Ms. Elmer Sage  has a past medical history of Anxiety; Arthritis; Carotid artery stenosis; Depression; [place] (hard of hearing); Hypoglycemia; Leg fracture, left (2009); PUD (peptic ulcer disease); Venous insufficiency; Venous stasis of lower extremity; and Venous ulcer of leg ([facility] [address] Ms. Elmer Sage  has a past surgical history that includes colonoscopy (2008); cataract removal (Bilateral); tonsillectomy; total knee arthroplasty (Left, 2000); total hip arthroplasty (Right, 2008); and orthopaedic (Left, 2009). Social History/Living Environment:     Pt lives independently in a one level home. She continues to drive  Prior Level of Function/Work/Activity:  Pt is retired. Dominant Side:         RIGHT    Current Medications:       Current Outpatient Prescriptions:     gabapentin (NEURONTIN) 100 mg capsule, Take 1 Cap by mouth See Admin Instructions. Take 1 tablet Po QHS for 1 week. If no excess sedation then increase to 2 tablets PO QHS. , Disp: 60 Cap, Rfl: 3    cyanocobalamin 1,000 mcg tablet, Take 1,000 mcg by mouth daily. , Disp: , Rfl:     meloxicam (MOBIC) 7.5 mg tablet, Take 1-2 Tabs by mouth daily as needed for Pain., Disp: 60 Tab, Rfl: 5    escitalopram oxalate (LEXAPRO) 10 mg tablet, Take 1 Tab by mouth daily. , Disp: 60 Tab, Rfl: 5    CALCIUM POLYCARBOPHIL (FIBERCON PO), Take  by mouth., Disp: , Rfl:     Bifidobacterium Infantis (ALIGN) 4 mg cap, Take  by mouth., Disp: , Rfl:     vitamin a-vitamin c-vit e-min (OCUVITE) tablet, Take 1 Tab by mouth daily. , Disp: , Rfl:     aspirin delayed-release 81 mg tablet, Take 81 mg by mouth daily.  R Carotid stenosis/ venous insuff/ venous stasis LLE- per Dr Merlin Bushman continue Aspirin 81 mg- Left msg for Saul Rangel MA to Dr Marilia Lomeli with this information 2/6/15 @ 1502, Disp: , Rfl:    Date Last Reviewed:  9/14/2017     Number of Personal Factors/Comorbidities that affect the Plan of Care: 1-2: MODERATE COMPLEXITY   EXAMINATION: Palpation:          Noted spine curve to the right due to scoliosis    ROM:        bilateral lumbar Rot limited 30%. All other motions WFL. Pain was noted with FB, BB and bilateral SB. With R SB pain felt on right and with left SB pain felt on the left                                    Strength:     R hip flex 4/5, abd 4-/5, quad 4/5, HS 3/5         L hip flex 3/5, HS 3-/5, quad 4-/5, abd 3/5            Special Tests: poor dynamic balance  Neurological Screen: na  Balance:  poor dynamic standing balance   Body Structures Involved:  1. Bones  2. Joints  3. Muscles  4. Ligaments Body Functions Affected:  1. Sensory/Pain  2. Neuromusculoskeletal  3. Movement Related Activities and Participation Affected:  1. Mobility  2. Self Care  3. Domestic Life   Number of elements (examined above) that affect the Plan of Care: 3: MODERATE COMPLEXITY   CLINICAL PRESENTATION:   Presentation: Evolving clinical presentation with changing clinical characteristics: MODERATE COMPLEXITY   CLINICAL DECISION MAKING:   Outcome Measure: Tool Used: Modified Oswestry Low Back Pain Questionnaire  Score:  Initial: 25/50  Most Recent: 22/50 (Date:9/14/17 )   Interpretation of Score: Each section is scored on a 0-5 scale, 5 representing the greatest disability. The scores of each section are added together for a total score of 50. Score 0 1-10 11-20 21-30 31-40 41-49 50   Modifier CH CI CJ CK CL CM CN     ? Mobility - Walking and Moving Around:     - CURRENT STATUS: CK - 40%-59% impaired, limited or restricted    - GOAL STATUS: CJ - 20%-39% impaired, limited or restricted    - D/C STATUS:  ---------------To be determined---------------      Medical Necessity:   · Patient is expected to demonstrate progress in strength, range of motion and balance to improve her balance and ability to ambulate and care for herself in the home.   Reason for Services/Other Comments:  · Patient continues to require skilled intervention due to ongoing pain in the back and legs that is affecting her balance and ability to walk and care for herself . Use of outcome tool(s) and clinical judgement create a POC that gives a: Questionable prediction of patient's progress: MODERATE COMPLEXITY            TREATMENT:   (In addition to Assessment/Re-Assessment sessions the following treatments were rendered)  Pre-treatment Symptoms/Complaints:  Pt reports no major changes. Pain: Initial: Pain Intensity 1: 5  Post Session:  5/10     THERAPEUTIC EXERCISE: (55 minutes):  Exercises per grid below to improve mobility, strength and balance. Required minimal verbal cues to promote proper body mechanics. Progressed resistance, range and repetitions as indicated. Nu-step level 0 x 10 min seat 8  Seated LAQ 5# x 30  Standing marching x 30  Marching on airex x 30-held  rhomberg stance eyes closed on airex x 1 min-held  Gait training with cane x 200 feet   Gait training outside on sloped area to simulate driveway with cane-held  Sit to stand 1 x 10  Lateral walking with hand held assistance 2 x 25 feet  Step ups on 6 inch step x 20  With cane  Sit to sands with exercises ball raises x 10 -held  Standing weightshift on to single leg with hand held assistance x 10-held  Standing bilateral hip abduction 3# x 20  Standing bilateral hip extension 3# x 20  scap retract green TB x 30  Standing t-bar flexion x 12  Step-overs for balance and curb/step negotiation x 10 with SBA  Manual Therapy (    na  ): na    Therapeutic Modalities:na    HEP: As directed. Treatment/Session Assessment:    · Response to Treatment:  Pt reporting no major changes since her last visit. She is progressing well and enjoys coming to therapy. She states that she feels better when she comes. She has a new cane and is using it for ambulation in the home and community. She has progressed to a smoother dena with the cane. She still requires cuing for picking her head up when she walks.   She was progressed to step-overs today to work on balance and cub/step negotiation. She will continue to benefit from therapy to decrease her fall risk and improve her overall strength. · Compliance with Program/Exercises: Will assess as treatment progresses. · Recommendations/Intent for next treatment session: \"Next visit will focus on advancements to more challenging activities\".   Total Treatment Duration: 55 min  PT Patient Time In/Time Out  Time In: 1100  Time Out: 1155  Treatment number [address] [place] [address], PT

## 2017-09-19 ENCOUNTER — HOSPITAL ENCOUNTER (OUTPATIENT)
Dept: PHYSICAL THERAPY | Age: 82
Discharge: HOME | End: 2017-09-19
Attending: HOSPITALIST
Payer: MEDICARE

## 2017-09-19 PROCEDURE — 97110 THERAPEUTIC EXERCISES: CPT

## 2017-09-19 NOTE — PROGRESS NOTES
[facility]   [address], [address]  [phone]   [phone]    Bran Guido  : 1922       OUTPATIENT PHYSICAL THERAPY:Daily Note and Progress Report 17    ICD-10: Treatment Diagnosis: difficulty walking R 26.2, back pain M54.5  Precautions/Allergies:   Latex, natural rubber   Fall Risk Score: 3 (? 5 = High Risk)  MD Orders: Eval and treat MEDICAL/REFERRING DIAGNOSIS:  Radiculopathy, lumbar region [M54.16]  Peripheral vascular disease, unspecified [I73.9]  Lumbago with sciatica, right side [M54.41]  Other chronic pain [G89.29]  Other specific arthropathies, not elsewhere classified, other specified site [M12.88]   DATE OF ONSET: chronic  REFERRING PHYSICIAN: Cipriano Gonzáles MD  RETURN PHYSICIAN APPOINTMENT: as needed     INITIAL ASSESSMENT:  Ms. Dorinda Alba presents with chronic back pain due to  Scoliosis, DDD and spondylosis. She has had difficulty with her back for years and has received prior injections by Dr. Flores Mcguire which have helped for a while. She does report her pain can extend into her legs and she has had worsening time with her walking and balance. Dr. Trisha Cantu has instructed pt to begin using a cane in order to decrease her fall risk. Pt would like to work on her overall strength and balance so she feels more comfortable with standing and walking and to help improve her overall mobility. PROBLEM LIST (Impacting functional limitations):  1. Decreased Strength  2. Decreased ADL/Functional Activities  3. Decreased Ambulation Ability/Technique  4. Decreased Balance  5. Increased Pain  6. Decreased Activity Tolerance  7. Decreased Flexibility/Joint Mobility INTERVENTIONS PLANNED:  1. Balance Exercise  2. Gait Training  3. Heat  4. Home Exercise Program (HEP)  5. Range of Motion (ROM)  6. Therapeutic Exercise/Strengthening   TREATMENT PLAN:  Effective Dates: 17 TO 10/17/17.   Frequency/Duration: 2 times a week for 10 weeks and upon reassessment,  will adjust frequency and duration as progress indicates. GOALS: (Goals have been discussed and agreed upon with patient.)  Short-Term Functional Goals: Time Frame: 5 weeks  1. Establish independent HEP with no cueing.-met  2. Pt will be able to report zero falls. -met  3. Pt will increase strength by 1/2 grade in order to rise from chair with more ease and less attempts.-in progress  Discharge Goals: Time Frame: 10 weeks  1. Improve score on Oswestry by at least 5-8 points for improved ADL function. 2. Pt will be able to demonstrate improved gait mechanics with use of cane and proper use of cane.-in progress  3. Pt will be able to maintain a more erect standing or sitting posture to limit kyphosis. -in progress  4. Pt will be able to walk for at least 10 minutes prior to sitting down.-in progress  Rehabilitation Potential For Stated Goals: [facility]'s therapy, I certify that the treatment plan above will be carried out by a therapist or under their direction. Thank you for this referral,  Melvina Silva PT     Referring Physician Signature: Elizabet Thurston MD              Date                    The information in this section was collected on 8/8/17 (except where otherwise noted). HISTORY:   History of Present Injury/Illness (Reason for Referral):  Ms. Nelda Salcedo , 80years of age, presents with chronic back pain due to  Scoliosis, DDD and spondylosis. She has had difficulty with her back for years and has received prior injections by Dr. Karly Roche which have helped for a while. She does report her pain can extend into her legs and she has had worsening time with her walking and balance. Dr. Sandra Khan has instructed pt to begin using a cane in order to decrease her fall risk.   Pt would like to work on her overall strength and balance so she feels more comfortable with standing and walking and to help improve her overall mobility. Past Medical History/Comorbidities:   Ms. Enriqueta Wadsworth  has a past medical history of Anxiety; Arthritis; Carotid artery stenosis; Depression; [place] (hard of hearing); Hypoglycemia; Leg fracture, left (2009); PUD (peptic ulcer disease); Venous insufficiency; Venous stasis of lower extremity; and Venous ulcer of leg ([facility] [address] Ms. Enriqueta Wadsworth  has a past surgical history that includes colonoscopy (2008); cataract removal (Bilateral); tonsillectomy; total knee arthroplasty (Left, 2000); total hip arthroplasty (Right, 2008); and orthopaedic (Left, 2009). Social History/Living Environment:     Pt lives independently in a one level home. She continues to drive  Prior Level of Function/Work/Activity:  Pt is retired. Dominant Side:         RIGHT    Current Medications:       Current Outpatient Prescriptions:     gabapentin (NEURONTIN) 100 mg capsule, Take 1 Cap by mouth See Admin Instructions. Take 1 tablet Po QHS for 1 week. If no excess sedation then increase to 2 tablets PO QHS. , Disp: 60 Cap, Rfl: 3    cyanocobalamin 1,000 mcg tablet, Take 1,000 mcg by mouth daily. , Disp: , Rfl:     meloxicam (MOBIC) 7.5 mg tablet, Take 1-2 Tabs by mouth daily as needed for Pain., Disp: 60 Tab, Rfl: 5    escitalopram oxalate (LEXAPRO) 10 mg tablet, Take 1 Tab by mouth daily. , Disp: 60 Tab, Rfl: 5    CALCIUM POLYCARBOPHIL (FIBERCON PO), Take  by mouth., Disp: , Rfl:     Bifidobacterium Infantis (ALIGN) 4 mg cap, Take  by mouth., Disp: , Rfl:     vitamin a-vitamin c-vit e-min (OCUVITE) tablet, Take 1 Tab by mouth daily. , Disp: , Rfl:     aspirin delayed-release 81 mg tablet, Take 81 mg by mouth daily.  R Carotid stenosis/ venous insuff/ venous stasis LLE- per Dr Jose Antonio Barreto continue Aspirin 81 mg- Left msg for Christika Kellogg MA to Dr Erica Castaneda with this information 2/6/15 @ 1502, Disp: , Rfl:    Date Last Reviewed:  9/19/2017     Number of Personal Factors/Comorbidities that affect the Plan of Care: 1-2: MODERATE COMPLEXITY   EXAMINATION: Palpation:          Noted spine curve to the right due to scoliosis    ROM:        bilateral lumbar Rot limited 30%. All other motions WFL. Pain was noted with FB, BB and bilateral SB. With R SB pain felt on right and with left SB pain felt on the left                                    Strength:     R hip flex 4/5, abd 4-/5, quad 4/5, HS 3/5         L hip flex 3/5, HS 3-/5, quad 4-/5, abd 3/5            Special Tests: poor dynamic balance  Neurological Screen: na  Balance:  poor dynamic standing balance   Body Structures Involved:  1. Bones  2. Joints  3. Muscles  4. Ligaments Body Functions Affected:  1. Sensory/Pain  2. Neuromusculoskeletal  3. Movement Related Activities and Participation Affected:  1. Mobility  2. Self Care  3. Domestic Life   Number of elements (examined above) that affect the Plan of Care: 3: MODERATE COMPLEXITY   CLINICAL PRESENTATION:   Presentation: Evolving clinical presentation with changing clinical characteristics: MODERATE COMPLEXITY   CLINICAL DECISION MAKING:   Outcome Measure: Tool Used: Modified Oswestry Low Back Pain Questionnaire  Score:  Initial: 25/50  Most Recent: 22/50 (Date:9/14/17 )   Interpretation of Score: Each section is scored on a 0-5 scale, 5 representing the greatest disability. The scores of each section are added together for a total score of 50. Score 0 1-10 11-20 21-30 31-40 41-49 50   Modifier CH CI CJ CK CL CM CN     ? Mobility - Walking and Moving Around:     - CURRENT STATUS: CK - 40%-59% impaired, limited or restricted    - GOAL STATUS: CJ - 20%-39% impaired, limited or restricted    - D/C STATUS:  ---------------To be determined---------------      Medical Necessity:   · Patient is expected to demonstrate progress in strength, range of motion and balance to improve her balance and ability to ambulate and care for herself in the home.   Reason for Services/Other Comments:  · Patient continues to require skilled intervention due to ongoing pain in the back and legs that is affecting her balance and ability to walk and care for herself . Use of outcome tool(s) and clinical judgement create a POC that gives a: Questionable prediction of patient's progress: MODERATE COMPLEXITY            TREATMENT:   (In addition to Assessment/Re-Assessment sessions the following treatments were rendered)  Pre-treatment Symptoms/Complaints:  Pt reports she felt really tired on Sunday, but ok today. Pain: Initial: Pain Intensity 1: 4  Post Session:  4/10     THERAPEUTIC EXERCISE: (60 minutes):  Exercises per grid below to improve mobility, strength and balance. Required minimal verbal cues to promote proper body mechanics. Progressed resistance, range and repetitions as indicated. Nu-step level 0 x 10 min seat 8  Seated LAQ 5# x 30  Standing marching x 30  Marching on airex x 30-held  rhomberg stance eyes closed on airex x 1 min-held  Gait training with cane x 200 feet   Gait training outside on sloped area to simulate driveway with cane-held  Sit to stand 1 x 10  Lateral walking with hand held assistance 2 x 25 feet  Step ups on 6 inch step x 20  With cane  Sit to sands with exercises ball raises x 10 -held  Standing weightshift on to single leg with hand held assistance x 10  Standing bilateral hip abduction 3# x 20  Standing bilateral hip extension 3# x 20  scap retract green TB x 30  Standing t-bar flexion x 12  Step-overs for balance and curb/step negotiation x 10 with SBA  Manual Therapy (    na  ): na    Therapeutic Modalities:na    HEP: As directed. Treatment/Session Assessment:    · Response to Treatment:  Pt tolerated treatment well today. Patient had a slight episode with her blood sugar, but after drinking some orange juice she was better. She is progressing well and enjoys coming to therapy. She states that she feels better when she comes. She has a new cane and is using it for ambulation in the home and community.    She still requires cuing for picking her head up when she walks. Pleasure to work with. · Compliance with Program/Exercises: Will assess as treatment progresses. · Recommendations/Intent for next treatment session: \"Next visit will focus on advancements to more challenging activities\".   Total Treatment Duration: 60 min  PT Patient Time In/Time Out  Time In: 1100  Time Out: 1200  Treatment number 41 [place], [place]

## 2017-09-22 ENCOUNTER — HOSPITAL ENCOUNTER (OUTPATIENT)
Dept: PHYSICAL THERAPY | Age: 82
Discharge: HOME | End: 2017-09-22
Attending: HOSPITALIST
Payer: MEDICARE

## 2017-09-22 PROCEDURE — 97110 THERAPEUTIC EXERCISES: CPT

## 2017-09-22 NOTE — PROGRESS NOTES
[facility]   [address], [address]  [phone]   [phone]    Colin Bustillo  : 1922       OUTPATIENT PHYSICAL THERAPY:Daily Note 17    ICD-10: Treatment Diagnosis: difficulty walking R 26.2, back pain M54.5  Precautions/Allergies:   Latex, natural rubber   Fall Risk Score: 3 (? 5 = High Risk)  MD Orders: Eval and treat MEDICAL/REFERRING DIAGNOSIS:  Radiculopathy, lumbar region [M54.16]  Peripheral vascular disease, unspecified [I73.9]  Lumbago with sciatica, right side [M54.41]  Other chronic pain [G89.29]  Other specific arthropathies, not elsewhere classified, other specified site [M12.88]   DATE OF ONSET: chronic  REFERRING PHYSICIAN: Zohreh Peng MD  RETURN PHYSICIAN APPOINTMENT: as needed     INITIAL ASSESSMENT:  Ms. Orvis Osgood presents with chronic back pain due to  Scoliosis, DDD and spondylosis. She has had difficulty with her back for years and has received prior injections by Dr. Mateus Adame which have helped for a while. She does report her pain can extend into her legs and she has had worsening time with her walking and balance. Dr. Kiera Marin has instructed pt to begin using a cane in order to decrease her fall risk. Pt would like to work on her overall strength and balance so she feels more comfortable with standing and walking and to help improve her overall mobility. PROBLEM LIST (Impacting functional limitations):  1. Decreased Strength  2. Decreased ADL/Functional Activities  3. Decreased Ambulation Ability/Technique  4. Decreased Balance  5. Increased Pain  6. Decreased Activity Tolerance  7. Decreased Flexibility/Joint Mobility INTERVENTIONS PLANNED:  1. Balance Exercise  2. Gait Training  3. Heat  4. Home Exercise Program (HEP)  5. Range of Motion (ROM)  6. Therapeutic Exercise/Strengthening   TREATMENT PLAN:  Effective Dates: 17 TO 10/17/17.   Frequency/Duration: 2 times a week for 10 weeks and upon reassessment,  will adjust frequency and duration as progress indicates. GOALS: (Goals have been discussed and agreed upon with patient.)  Short-Term Functional Goals: Time Frame: 5 weeks  1. Establish independent HEP with no cueing.-met  2. Pt will be able to report zero falls. -met  3. Pt will increase strength by 1/2 grade in order to rise from chair with more ease and less attempts.-in progress  Discharge Goals: Time Frame: 10 weeks  1. Improve score on Oswestry by at least 5-8 points for improved ADL function. 2. Pt will be able to demonstrate improved gait mechanics with use of cane and proper use of cane.-in progress  3. Pt will be able to maintain a more erect standing or sitting posture to limit kyphosis. -in progress  4. Pt will be able to walk for at least 10 minutes prior to sitting down.-in progress  Rehabilitation Potential For Stated Goals: [facility]'s therapy, I certify that the treatment plan above will be carried out by a therapist or under their direction. Thank you for this referral,  Dustin Bosch, PT     Referring Physician Signature: Geri Yepez MD              Date                    The information in this section was collected on 8/8/17 (except where otherwise noted). HISTORY:   History of Present Injury/Illness (Reason for Referral):  Ms. Hilary Lu , 80years of age, presents with chronic back pain due to  Scoliosis, DDD and spondylosis. She has had difficulty with her back for years and has received prior injections by Dr. Minda Moreira which have helped for a while. She does report her pain can extend into her legs and she has had worsening time with her walking and balance. Dr. Gertrude Lang has instructed pt to begin using a cane in order to decrease her fall risk. Pt would like to work on her overall strength and balance so she feels more comfortable with standing and walking and to help improve her overall mobility.   Past Medical History/Comorbidities:   Ms. Amanuel Wayne  has a past medical history of Anxiety; Arthritis; Carotid artery stenosis; Depression; [place] (hard of hearing); Hypoglycemia; Leg fracture, left (2009); PUD (peptic ulcer disease); Venous insufficiency; Venous stasis of lower extremity; and Venous ulcer of leg ([facility] [address] Ms. Amanuel Wayne  has a past surgical history that includes colonoscopy (2008); cataract removal (Bilateral); tonsillectomy; total knee arthroplasty (Left, 2000); total hip arthroplasty (Right, 2008); and orthopaedic (Left, 2009). Social History/Living Environment:     Pt lives independently in a one level home. She continues to drive  Prior Level of Function/Work/Activity:  Pt is retired. Dominant Side:         RIGHT    Current Medications:       Current Outpatient Prescriptions:     gabapentin (NEURONTIN) 100 mg capsule, Take 1 Cap by mouth See Admin Instructions. Take 1 tablet Po QHS for 1 week. If no excess sedation then increase to 2 tablets PO QHS. , Disp: 60 Cap, Rfl: 3    cyanocobalamin 1,000 mcg tablet, Take 1,000 mcg by mouth daily. , Disp: , Rfl:     meloxicam (MOBIC) 7.5 mg tablet, Take 1-2 Tabs by mouth daily as needed for Pain., Disp: 60 Tab, Rfl: 5    escitalopram oxalate (LEXAPRO) 10 mg tablet, Take 1 Tab by mouth daily. , Disp: 60 Tab, Rfl: 5    CALCIUM POLYCARBOPHIL (FIBERCON PO), Take  by mouth., Disp: , Rfl:     Bifidobacterium Infantis (ALIGN) 4 mg cap, Take  by mouth., Disp: , Rfl:     vitamin a-vitamin c-vit e-min (OCUVITE) tablet, Take 1 Tab by mouth daily. , Disp: , Rfl:     aspirin delayed-release 81 mg tablet, Take 81 mg by mouth daily.  R Carotid stenosis/ venous insuff/ venous stasis LLE- per Dr Hernesto Rashid continue Aspirin 81 mg- Left msg for Arelis Lazo MA to Dr Peety Lyle with this information 2/6/15 @ 1502, Disp: , Rfl:    Date Last Reviewed:  9/22/2017     Number of Personal Factors/Comorbidities that affect the Plan of Care: 1-2: MODERATE COMPLEXITY   EXAMINATION:   Palpation: Noted spine curve to the right due to scoliosis    ROM:        bilateral lumbar Rot limited 30%. All other motions WFL. Pain was noted with FB, BB and bilateral SB. With R SB pain felt on right and with left SB pain felt on the left                                    Strength:     R hip flex 4/5, abd 4-/5, quad 4/5, HS 3/5         L hip flex 3/5, HS 3-/5, quad 4-/5, abd 3/5            Special Tests: poor dynamic balance  Neurological Screen: na  Balance:  poor dynamic standing balance   Body Structures Involved:  1. Bones  2. Joints  3. Muscles  4. Ligaments Body Functions Affected:  1. Sensory/Pain  2. Neuromusculoskeletal  3. Movement Related Activities and Participation Affected:  1. Mobility  2. Self Care  3. Domestic Life   Number of elements (examined above) that affect the Plan of Care: 3: MODERATE COMPLEXITY   CLINICAL PRESENTATION:   Presentation: Evolving clinical presentation with changing clinical characteristics: MODERATE COMPLEXITY   CLINICAL DECISION MAKING:   Outcome Measure: Tool Used: Modified Oswestry Low Back Pain Questionnaire  Score:  Initial: 25/50  Most Recent: 22/50 (Date:9/14/17 )   Interpretation of Score: Each section is scored on a 0-5 scale, 5 representing the greatest disability. The scores of each section are added together for a total score of 50. Score 0 1-10 11-20 21-30 31-40 41-49 50   Modifier CH CI CJ CK CL CM CN     ? Mobility - Walking and Moving Around:     - CURRENT STATUS: CK - 40%-59% impaired, limited or restricted    - GOAL STATUS: CJ - 20%-39% impaired, limited or restricted    - D/C STATUS:  ---------------To be determined---------------      Medical Necessity:   · Patient is expected to demonstrate progress in strength, range of motion and balance to improve her balance and ability to ambulate and care for herself in the home.   Reason for Services/Other Comments:  · Patient continues to require skilled intervention due to ongoing pain in the back and legs that is affecting her balance and ability to walk and care for herself . Use of outcome tool(s) and clinical judgement create a POC that gives a: Questionable prediction of patient's progress: MODERATE COMPLEXITY            TREATMENT:   (In addition to Assessment/Re-Assessment sessions the following treatments were rendered)  Pre-treatment Symptoms/Complaints:  Pt reports she has increased back pain today and she feels sore and stiff all over. Pain: Initial: Pain Intensity 1: 6  Post Session:  5/10     THERAPEUTIC EXERCISE: (55 minutes):  Exercises per grid below to improve mobility, strength and balance. Required minimal verbal cues to promote proper body mechanics. Progressed resistance, range and repetitions as indicated. Nu-step level 0 x 10 min seat 8  Seated LAQ 5# x 30-held  Standing marching x 30  Marching on airex x 30  rhomberg stance eyes closed on airex x 1 min-held  Gait training with cane x 200 feet   Gait training outside on sloped area to simulate driveway with cane-held  Sit to stand 1 x 10  Lateral walking with hand held assistance 2 x 25 feet-held  Step ups on 6 inch step x 20  With cane  Sit to sands with exercises ball raises x 10 -held  Standing weightshift on to single leg with hand held assistance x 10-held  Standing bilateral hip abduction 3# x 25  Standing bilateral hip extension 3# x 25  scap retract green TB x 30  Standing t-bar flexion x 12  Step-overs for balance and curb/step negotiation x 10 with SBA  Manual Therapy (    na  ): na    Therapeutic Modalities: MHP x 10 minutes in sitting    HEP: As directed. Treatment/Session Assessment:    · Response to Treatment:  Pt reporting increased back pain and general stiffness today. She did not sleep well last night and believes this may be contributing. She moved more slowly today with therapy and needed to sit more frequently. MHP was added at end of treatment to help her with her back pain.   · Compliance with Program/Exercises: Will assess as treatment progresses. · Recommendations/Intent for next treatment session: \"Next visit will focus on advancements to more challenging activities\".   Total Treatment Duration: 65 min  PT Patient Time In/Time Out  Time In: 0115  Time Out: 5404  Treatment number 77 Justyna Garcia PT

## 2017-09-27 ENCOUNTER — HOSPITAL ENCOUNTER (OUTPATIENT)
Dept: PHYSICAL THERAPY | Age: 82
Discharge: HOME | End: 2017-09-27
Attending: HOSPITALIST
Payer: MEDICARE

## 2017-09-29 ENCOUNTER — HOSPITAL ENCOUNTER (OUTPATIENT)
Dept: PHYSICAL THERAPY | Age: 82
Discharge: HOME | End: 2017-09-29
Attending: HOSPITALIST
Payer: MEDICARE

## 2017-09-29 PROCEDURE — 97140 MANUAL THERAPY 1/> REGIONS: CPT

## 2017-09-29 PROCEDURE — 97110 THERAPEUTIC EXERCISES: CPT

## 2017-09-29 NOTE — PROGRESS NOTES
Therapy Center at [facility] Therapy   [address], [address]  [phone]   [phone]    Lisa Hightower  : 1922       OUTPATIENT PHYSICAL THERAPY:Daily Note 17    ICD-10: Treatment Diagnosis: difficulty walking R 26.2, back pain M54.5  Precautions/Allergies:   Latex, natural rubber   Fall Risk Score: 3 (? 5 = High Risk)  MD Orders: Eval and treat MEDICAL/REFERRING DIAGNOSIS:  Radiculopathy, lumbar region [M54.16]  Peripheral vascular disease, unspecified [I73.9]  Lumbago with sciatica, right side [M54.41]  Other chronic pain [G89.29]  Other specific arthropathies, not elsewhere classified, other specified site [M12.88]   DATE OF ONSET: chronic  REFERRING PHYSICIAN: Macy Lowry MD  RETURN PHYSICIAN APPOINTMENT: as needed     INITIAL ASSESSMENT:  Ms. Robbin Maloney presents with chronic back pain due to  Scoliosis, DDD and spondylosis. She has had difficulty with her back for years and has received prior injections by Dr. Sawyer Song which have helped for a while. She does report her pain can extend into her legs and she has had worsening time with her walking and balance. Dr. Adriana Patel has instructed pt to begin using a cane in order to decrease her fall risk. Pt would like to work on her overall strength and balance so she feels more comfortable with standing and walking and to help improve her overall mobility. PROBLEM LIST (Impacting functional limitations):  1. Decreased Strength  2. Decreased ADL/Functional Activities  3. Decreased Ambulation Ability/Technique  4. Decreased Balance  5. Increased Pain  6. Decreased Activity Tolerance  7. Decreased Flexibility/Joint Mobility INTERVENTIONS PLANNED:  1. Balance Exercise  2. Gait Training  3. Heat  4. Home Exercise Program (HEP)  5. Range of Motion (ROM)  6. Therapeutic Exercise/Strengthening   TREATMENT PLAN:  Effective Dates: 17 TO 10/17/17.   Frequency/Duration: 2 times a week for 10 weeks and upon reassessment,  will adjust frequency and duration as progress indicates. GOALS: (Goals have been discussed and agreed upon with patient.)  Short-Term Functional Goals: Time Frame: 5 weeks  1. Establish independent HEP with no cueing.-met  2. Pt will be able to report zero falls. -met  3. Pt will increase strength by 1/2 grade in order to rise from chair with more ease and less attempts.-in progress  Discharge Goals: Time Frame: 10 weeks  1. Improve score on Oswestry by at least 5-8 points for improved ADL function. 2. Pt will be able to demonstrate improved gait mechanics with use of cane and proper use of cane.-in progress  3. Pt will be able to maintain a more erect standing or sitting posture to limit kyphosis. -in progress  4. Pt will be able to walk for at least 10 minutes prior to sitting down.-in progress  Rehabilitation Potential For Stated Goals: [facility]'s therapy, I certify that the treatment plan above will be carried out by a therapist or under their direction. Thank you for this referral,  Janeth Hinojosa, PT     Referring Physician Signature: Everardo Mock MD              Date                    The information in this section was collected on 8/8/17 (except where otherwise noted). HISTORY:   History of Present Injury/Illness (Reason for Referral):  Ms. Alexey Reis , 80years of age, presents with chronic back pain due to  Scoliosis, DDD and spondylosis. She has had difficulty with her back for years and has received prior injections by Dr. Franklin Bruner which have helped for a while. She does report her pain can extend into her legs and she has had worsening time with her walking and balance. Dr. Julisa Guadarrama has instructed pt to begin using a cane in order to decrease her fall risk. Pt would like to work on her overall strength and balance so she feels more comfortable with standing and walking and to help improve her overall mobility.   Past Medical History/Comorbidities:   Ms. Konstantin Terry  has a past medical history of Anxiety; Arthritis; Carotid artery stenosis; Depression; [place] (hard of hearing); Hypoglycemia; Leg fracture, left (2009); PUD (peptic ulcer disease); Venous insufficiency; Venous stasis of lower extremity; and Venous ulcer of leg ([facility] [address] Ms. Konstantin Terry  has a past surgical history that includes colonoscopy (2008); cataract removal (Bilateral); tonsillectomy; total knee arthroplasty (Left, 2000); total hip arthroplasty (Right, 2008); and orthopaedic (Left, 2009). Social History/Living Environment:     Pt lives independently in a one level home. She continues to drive  Prior Level of Function/Work/Activity:  Pt is retired. Dominant Side:         RIGHT    Current Medications:       Current Outpatient Prescriptions:     gabapentin (NEURONTIN) 100 mg capsule, Take 1 Cap by mouth See Admin Instructions. Take 1 tablet Po QHS for 1 week. If no excess sedation then increase to 2 tablets PO QHS. , Disp: 60 Cap, Rfl: 3    cyanocobalamin 1,000 mcg tablet, Take 1,000 mcg by mouth daily. , Disp: , Rfl:     meloxicam (MOBIC) 7.5 mg tablet, Take 1-2 Tabs by mouth daily as needed for Pain., Disp: 60 Tab, Rfl: 5    escitalopram oxalate (LEXAPRO) 10 mg tablet, Take 1 Tab by mouth daily. , Disp: 60 Tab, Rfl: 5    CALCIUM POLYCARBOPHIL (FIBERCON PO), Take  by mouth., Disp: , Rfl:     Bifidobacterium Infantis (ALIGN) 4 mg cap, Take  by mouth., Disp: , Rfl:     vitamin a-vitamin c-vit e-min (OCUVITE) tablet, Take 1 Tab by mouth daily. , Disp: , Rfl:     aspirin delayed-release 81 mg tablet, Take 81 mg by mouth daily.  R Carotid stenosis/ venous insuff/ venous stasis LLE- per Dr Charu Bhagat continue Aspirin 81 mg- Left msg for Lior Aquino MA to Dr Adama Bhakta with this information 2/6/15 @ 1502, Disp: , Rfl:    Date Last Reviewed:  9/29/2017     Number of Personal Factors/Comorbidities that affect the Plan of Care: 1-2: MODERATE COMPLEXITY   EXAMINATION:   Palpation: Noted spine curve to the right due to scoliosis    ROM:        bilateral lumbar Rot limited 30%. All other motions WFL. Pain was noted with FB, BB and bilateral SB. With R SB pain felt on right and with left SB pain felt on the left                                    Strength:     R hip flex 4/5, abd 4-/5, quad 4/5, HS 3/5         L hip flex 3/5, HS 3-/5, quad 4-/5, abd 3/5            Special Tests: poor dynamic balance  Neurological Screen: na  Balance:  poor dynamic standing balance   Body Structures Involved:  1. Bones  2. Joints  3. Muscles  4. Ligaments Body Functions Affected:  1. Sensory/Pain  2. Neuromusculoskeletal  3. Movement Related Activities and Participation Affected:  1. Mobility  2. Self Care  3. Domestic Life   Number of elements (examined above) that affect the Plan of Care: 3: MODERATE COMPLEXITY   CLINICAL PRESENTATION:   Presentation: Evolving clinical presentation with changing clinical characteristics: MODERATE COMPLEXITY   CLINICAL DECISION MAKING:   Outcome Measure: Tool Used: Modified Oswestry Low Back Pain Questionnaire  Score:  Initial: 25/50  Most Recent: 22/50 (Date:9/14/17 )   Interpretation of Score: Each section is scored on a 0-5 scale, 5 representing the greatest disability. The scores of each section are added together for a total score of 50. Score 0 1-10 11-20 21-30 31-40 41-49 50   Modifier CH CI CJ CK CL CM CN     ? Mobility - Walking and Moving Around:     - CURRENT STATUS: CK - 40%-59% impaired, limited or restricted    - GOAL STATUS: CJ - 20%-39% impaired, limited or restricted    - D/C STATUS:  ---------------To be determined---------------      Medical Necessity:   · Patient is expected to demonstrate progress in strength, range of motion and balance to improve her balance and ability to ambulate and care for herself in the home.   Reason for Services/Other Comments:  · Patient continues to require skilled intervention due to ongoing pain in the back and legs that is affecting her balance and ability to walk and care for herself . Use of outcome tool(s) and clinical judgement create a POC that gives a: Questionable prediction of patient's progress: MODERATE COMPLEXITY            TREATMENT:   (In addition to Assessment/Re-Assessment sessions the following treatments were rendered)  Pre-treatment Symptoms/Complaints:  Pt reports she has a catch in her right hip. Pain: Initial: Pain Intensity 1: 6  Post Session:  5/10     THERAPEUTIC EXERCISE: (40 minutes):  Exercises per grid below to improve mobility, strength and balance. Required minimal verbal cues to promote proper body mechanics. Progressed resistance, range and repetitions as indicated. Nu-step level 0 x 10 min seat 8  Seated LAQ 5# x 30-held  Standing marching x 30  Marching on airex x 30  rhomberg stance eyes closed on airex x 1 min-held  Gait training with cane x 200 feet   Gait training outside on sloped area to simulate driveway with cane-held  Sit to stand 1 x 10  Lateral walking with hand held assistance 2 x 25 feet-held  Step ups on 6 inch step x 20  With cane  Sit to sands with exercises ball raises x 10 -held  Standing weightshift on to single leg with hand held assistance x 10-held  Standing bilateral hip abduction 3# x 25  Standing bilateral hip extension 3# x 25  scap retract green TB x 30  Standing t-bar flexion x 12  Step-overs for balance and curb/step negotiation x 10 with SBA-held  Manual Therapy (    10 min  ): STM to right hip in the hip flexor as well as adductor region    Therapeutic Modalities: MHP x 10 minutes in sitting    HEP: As directed. Treatment/Session Assessment:    · Response to Treatment:  Pt reporting the heat helped her last time. She did have an initial catch in the right hip that resolved after working and stretching the area. She was ambulating more slowly today, but safely with the use of her cane.   She continues to report the therapy helps her and she is performing light exercises at home. Compliance with Program/Exercises: Will assess as treatment progresses. · Recommendations/Intent for next treatment session: \"Next visit will focus on advancements to more challenging activities\".   Total Treatment Duration: 60 min  PT Patient Time In/Time Out  Time In: 1215  Time Out: 0115  Treatment number [address], PT

## 2017-10-03 ENCOUNTER — HOSPITAL ENCOUNTER (OUTPATIENT)
Dept: PHYSICAL THERAPY | Age: 82
Discharge: HOME | End: 2017-10-03
Attending: HOSPITALIST
Payer: MEDICARE

## 2017-10-03 PROCEDURE — 97110 THERAPEUTIC EXERCISES: CPT

## 2017-10-03 NOTE — PROGRESS NOTES
Therapy Center at [place] [address] Therapy   [address], [facility] [address]  [phone]   [phone]    Israel Brady  : 1922       OUTPATIENT PHYSICAL THERAPY:Daily Note 10/3/17    ICD-10: Treatment Diagnosis: difficulty walking R 26.2, back pain M54.5  Precautions/Allergies:   Latex, natural rubber   Fall Risk Score: 3 (? 5 = High Risk)  MD Orders: Eval and treat MEDICAL/REFERRING DIAGNOSIS:  Radiculopathy, lumbar region [M54.16]  Peripheral vascular disease, unspecified [I73.9]  Lumbago with sciatica, right side [M54.41]  Other chronic pain [G89.29]  Other specific arthropathies, not elsewhere classified, other specified site [M12.88]   DATE OF ONSET: chronic  REFERRING PHYSICIAN: Meredith Rodriguez MD  RETURN PHYSICIAN APPOINTMENT: as needed     INITIAL ASSESSMENT:  Ms. Elvia Lentz presents with chronic back pain due to  Scoliosis, DDD and spondylosis. She has had difficulty with her back for years and has received prior injections by Dr. Adarsh Granados which have helped for a while. She does report her pain can extend into her legs and she has had worsening time with her walking and balance. Dr. Bladimir Thompson has instructed pt to begin using a cane in order to decrease her fall risk. Pt would like to work on her overall strength and balance so she feels more comfortable with standing and walking and to help improve her overall mobility. PROBLEM LIST (Impacting functional limitations):  1. Decreased Strength  2. Decreased ADL/Functional Activities  3. Decreased Ambulation Ability/Technique  4. Decreased Balance  5. Increased Pain  6. Decreased Activity Tolerance  7. Decreased Flexibility/Joint Mobility INTERVENTIONS PLANNED:  1. Balance Exercise  2. Gait Training  3. Heat  4. Home Exercise Program (HEP)  5. Range of Motion (ROM)  6. Therapeutic Exercise/Strengthening   TREATMENT PLAN:  Effective Dates: 17 TO 10/17/17.   Frequency/Duration: 2 times a week for 10 weeks and upon reassessment,  will adjust frequency and duration as progress indicates. GOALS: (Goals have been discussed and agreed upon with patient.)  Short-Term Functional Goals: Time Frame: 5 weeks  1. Establish independent HEP with no cueing.-met  2. Pt will be able to report zero falls. -met  3. Pt will increase strength by 1/2 grade in order to rise from chair with more ease and less attempts.-in progress  Discharge Goals: Time Frame: 10 weeks  1. Improve score on Oswestry by at least 5-8 points for improved ADL function. 2. Pt will be able to demonstrate improved gait mechanics with use of cane and proper use of cane.-in progress  3. Pt will be able to maintain a more erect standing or sitting posture to limit kyphosis. -in progress  4. Pt will be able to walk for at least 10 minutes prior to sitting down.-in progress  Rehabilitation Potential For Stated Goals: 176 [facility]'s therapy, I certify that the treatment plan above will be carried out by a therapist or under their direction. Thank you for this referral,  Carlita Santos PT     Referring Physician Signature: Cale Loya MD              Date                    The information in this section was collected on 8/8/17 (except where otherwise noted). HISTORY:   History of Present Injury/Illness (Reason for Referral):  Ms. Lucie Ortez , 80years of age, presents with chronic back pain due to  Scoliosis, DDD and spondylosis. She has had difficulty with her back for years and has received prior injections by Dr. Almanza Friday which have helped for a while. She does report her pain can extend into her legs and she has had worsening time with her walking and balance. Dr. Anna Carrizales has instructed pt to begin using a cane in order to decrease her fall risk. Pt would like to work on her overall strength and balance so she feels more comfortable with standing and walking and to help improve her overall mobility.   Past Medical History/Comorbidities:   Ms. Jess Baptiste  has a past medical history of Anxiety; Arthritis; Carotid artery stenosis; Depression; [place] (hard of hearing); Hypoglycemia; Leg fracture, left (2009); PUD (peptic ulcer disease); Venous insufficiency; Venous stasis of lower extremity; and Venous ulcer of leg ([facility] [address] Ms. Jess Baptiste  has a past surgical history that includes colonoscopy (2008); cataract removal (Bilateral); tonsillectomy; total knee arthroplasty (Left, 2000); total hip arthroplasty (Right, 2008); and orthopaedic (Left, 2009). Social History/Living Environment:     Pt lives independently in a one level home. She continues to drive  Prior Level of Function/Work/Activity:  Pt is retired. Dominant Side:         RIGHT    Current Medications:       Current Outpatient Prescriptions:     gabapentin (NEURONTIN) 100 mg capsule, Take 1 Cap by mouth See Admin Instructions. Take 1 tablet Po QHS for 1 week. If no excess sedation then increase to 2 tablets PO QHS. , Disp: 60 Cap, Rfl: 3    cyanocobalamin 1,000 mcg tablet, Take 1,000 mcg by mouth daily. , Disp: , Rfl:     meloxicam (MOBIC) 7.5 mg tablet, Take 1-2 Tabs by mouth daily as needed for Pain., Disp: 60 Tab, Rfl: 5    escitalopram oxalate (LEXAPRO) 10 mg tablet, Take 1 Tab by mouth daily. , Disp: 60 Tab, Rfl: 5    CALCIUM POLYCARBOPHIL (FIBERCON PO), Take  by mouth., Disp: , Rfl:     Bifidobacterium Infantis (ALIGN) 4 mg cap, Take  by mouth., Disp: , Rfl:     vitamin a-vitamin c-vit e-min (OCUVITE) tablet, Take 1 Tab by mouth daily. , Disp: , Rfl:     aspirin delayed-release 81 mg tablet, Take 81 mg by mouth daily.  R Carotid stenosis/ venous insuff/ venous stasis LLE- per Dr Katia Garcia continue Aspirin 81 mg- Left msg for Lamont Hurtado MA to Dr Jeevan Christian with this information 2/6/15 @ 1502, Disp: , Rfl:    Date Last Reviewed:  10/3/2017     Number of Personal Factors/Comorbidities that affect the Plan of Care: 1-2: MODERATE COMPLEXITY   EXAMINATION:   Palpation: Noted spine curve to the right due to scoliosis    ROM:        bilateral lumbar Rot limited 30%. All other motions WFL. Pain was noted with FB, BB and bilateral SB. With R SB pain felt on right and with left SB pain felt on the left                                    Strength:     R hip flex 4/5, abd 4-/5, quad 4/5, HS 3/5         L hip flex 3/5, HS 3-/5, quad 4-/5, abd 3/5            Special Tests: poor dynamic balance  Neurological Screen: na  Balance:  poor dynamic standing balance   Body Structures Involved:  1. Bones  2. Joints  3. Muscles  4. Ligaments Body Functions Affected:  1. Sensory/Pain  2. Neuromusculoskeletal  3. Movement Related Activities and Participation Affected:  1. Mobility  2. Self Care  3. Domestic Life   Number of elements (examined above) that affect the Plan of Care: 3: MODERATE COMPLEXITY   CLINICAL PRESENTATION:   Presentation: Evolving clinical presentation with changing clinical characteristics: MODERATE COMPLEXITY   CLINICAL DECISION MAKING:   Outcome Measure: Tool Used: Modified Oswestry Low Back Pain Questionnaire  Score:  Initial: 25/50  Most Recent: 22/50 (Date:9/14/17 )   Interpretation of Score: Each section is scored on a 0-5 scale, 5 representing the greatest disability. The scores of each section are added together for a total score of 50. Score 0 1-10 11-20 21-30 31-40 41-49 50   Modifier CH CI CJ CK CL CM CN     ? Mobility - Walking and Moving Around:     - CURRENT STATUS: CK - 40%-59% impaired, limited or restricted    - GOAL STATUS: CJ - 20%-39% impaired, limited or restricted    - D/C STATUS:  ---------------To be determined---------------      Medical Necessity:   · Patient is expected to demonstrate progress in strength, range of motion and balance to improve her balance and ability to ambulate and care for herself in the home.   Reason for Services/Other Comments:  · Patient continues to require skilled intervention due to ongoing pain in the back and legs that is affecting her balance and ability to walk and care for herself . Use of outcome tool(s) and clinical judgement create a POC that gives a: Questionable prediction of patient's progress: MODERATE COMPLEXITY            TREATMENT:   (In addition to Assessment/Re-Assessment sessions the following treatments were rendered)  Pre-treatment Symptoms/Complaints:  Pt reports she feels increased back pain today. Pain: Initial: Pain Intensity 1: 6  Post Session:  5/10     THERAPEUTIC EXERCISE: (50 minutes):  Exercises per grid below to improve mobility, strength and balance. Required minimal verbal cues to promote proper body mechanics. Progressed resistance, range and repetitions as indicated. Nu-step level 0 x 10 min seat 8  Seated LAQ 5# x 30-  Standing marching x 30  Marching on airex x 30  rhomberg stance eyes closed on airex x 1 min-held  Gait training with cane x 200 feet   Gait training outside on sloped area to simulate driveway with cane-held  Sit to stand with overhead ball raise 2# ball x 6 with assistance  Lateral walking with hand held assistance 2 x 25 feet-held  Step ups on 6 inch step x 20    Sit to sands with exercises ball raises x 10 -held  Standing weightshift on to single leg with hand held assistance x 10-held  Standing bilateral hip abduction 3# x 25  Standing bilateral hip extension 3# x 25  Standing hip flexion bilaterally 3# x 20  scap retract green TB x 30-held  Standing t-bar flexion x 15  Step-overs for balance and curb/step negotiation x 10 with SBA-held  Manual Therapy (    0 min  ): na    Therapeutic Modalities: MHP x 10 minutes in sitting    HEP: As directed. Treatment/Session Assessment:    · Response to Treatment:  Pt reporting increased back pain. She reported waking up and feeling this way. We started with heat to help pt feel better followed by exercises. She is moving more slowly over the past couple of treatments.   She does report feeling better after coming therapy. Recommended pt start using heat at home on her back region. · Compliance with Program/Exercises: Will assess as treatment progresses. · Recommendations/Intent for next treatment session: \"Next visit will focus on advancements to more challenging activities\".   Total Treatment Duration: 60 min  PT Patient Time In/Time Out  Time In: 1145  Time Out: 1245  Treatment number [facility] ALEXANDER Johnson, PT

## 2017-10-06 ENCOUNTER — HOSPITAL ENCOUNTER (OUTPATIENT)
Dept: PHYSICAL THERAPY | Age: 82
Discharge: HOME | End: 2017-10-06
Attending: HOSPITALIST
Payer: MEDICARE

## 2017-10-06 PROCEDURE — 97110 THERAPEUTIC EXERCISES: CPT

## 2017-10-06 NOTE — PROGRESS NOTES
[facility]   [address], [address]  [phone]   [phone]    Berny Ice  : 1922       OUTPATIENT PHYSICAL THERAPY:Daily Note 10/6/17    ICD-10: Treatment Diagnosis: difficulty walking R 26.2, back pain M54.5  Precautions/Allergies:   Latex, natural rubber   Fall Risk Score: 3 (? 5 = High Risk)  MD Orders: Eval and treat MEDICAL/REFERRING DIAGNOSIS:  Radiculopathy, lumbar region [M54.16]  Peripheral vascular disease, unspecified [I73.9]  Lumbago with sciatica, right side [M54.41]  Other chronic pain [G89.29]  Other specific arthropathies, not elsewhere classified, other specified site [M12.88]   DATE OF ONSET: chronic  REFERRING PHYSICIAN: Janis Beltran MD  RETURN PHYSICIAN APPOINTMENT: as needed     INITIAL ASSESSMENT:  Ms. Kristan Garcia presents with chronic back pain due to  Scoliosis, DDD and spondylosis. She has had difficulty with her back for years and has received prior injections by Dr. Marcial Woodson which have helped for a while. She does report her pain can extend into her legs and she has had worsening time with her walking and balance. Dr. Williams Moon has instructed pt to begin using a cane in order to decrease her fall risk. Pt would like to work on her overall strength and balance so she feels more comfortable with standing and walking and to help improve her overall mobility. PROBLEM LIST (Impacting functional limitations):  1. Decreased Strength  2. Decreased ADL/Functional Activities  3. Decreased Ambulation Ability/Technique  4. Decreased Balance  5. Increased Pain  6. Decreased Activity Tolerance  7. Decreased Flexibility/Joint Mobility INTERVENTIONS PLANNED:  1. Balance Exercise  2. Gait Training  3. Heat  4. Home Exercise Program (HEP)  5. Range of Motion (ROM)  6. Therapeutic Exercise/Strengthening   TREATMENT PLAN:  Effective Dates: 17 TO 10/17/17.   Frequency/Duration: 2 times a week for 10 weeks and upon reassessment,  will adjust frequency and duration as progress indicates. GOALS: (Goals have been discussed and agreed upon with patient.)  Short-Term Functional Goals: Time Frame: 5 weeks  1. Establish independent HEP with no cueing.-met  2. Pt will be able to report zero falls. -met  3. Pt will increase strength by 1/2 grade in order to rise from chair with more ease and less attempts.-in progress  Discharge Goals: Time Frame: 10 weeks  1. Improve score on Oswestry by at least 5-8 points for improved ADL function. 2. Pt will be able to demonstrate improved gait mechanics with use of cane and proper use of cane.-in progress  3. Pt will be able to maintain a more erect standing or sitting posture to limit kyphosis. -in progress  4. Pt will be able to walk for at least 10 minutes prior to sitting down.-in progress  Rehabilitation Potential For Stated Goals: [address] [facility]'s therapy, I certify that the treatment plan above will be carried out by a therapist or under their direction. Thank you for this referral,  Martha Mccoy, PT     Referring Physician Signature: Harpreet Sandy MD              Date                    The information in this section was collected on 8/8/17 (except where otherwise noted). HISTORY:   History of Present Injury/Illness (Reason for Referral):  Ms. Carmelo Alfaro , 80years of age, presents with chronic back pain due to  Scoliosis, DDD and spondylosis. She has had difficulty with her back for years and has received prior injections by Dr. Keane which have helped for a while. She does report her pain can extend into her legs and she has had worsening time with her walking and balance. Dr. Chapin Vidal has instructed pt to begin using a cane in order to decrease her fall risk. Pt would like to work on her overall strength and balance so she feels more comfortable with standing and walking and to help improve her overall mobility.   Past Medical History/Comorbidities:   Ms. Jhon Peck  has a past medical history of Anxiety; Arthritis; Carotid artery stenosis; Depression; [place] (hard of hearing); Hypoglycemia; Leg fracture, left (2009); PUD (peptic ulcer disease); Venous insufficiency; Venous stasis of lower extremity; and Venous ulcer of leg ([facility] [address] Ms. Jhon Peck  has a past surgical history that includes colonoscopy (2008); cataract removal (Bilateral); tonsillectomy; total knee arthroplasty (Left, 2000); total hip arthroplasty (Right, 2008); and orthopaedic (Left, 2009). Social History/Living Environment:     Pt lives independently in a one level home. She continues to drive  Prior Level of Function/Work/Activity:  Pt is retired. Dominant Side:         RIGHT    Current Medications:       Current Outpatient Prescriptions:     gabapentin (NEURONTIN) 100 mg capsule, Take 1 Cap by mouth See Admin Instructions. Take 1 tablet Po QHS for 1 week. If no excess sedation then increase to 2 tablets PO QHS. , Disp: 60 Cap, Rfl: 3    cyanocobalamin 1,000 mcg tablet, Take 1,000 mcg by mouth daily. , Disp: , Rfl:     meloxicam (MOBIC) 7.5 mg tablet, Take 1-2 Tabs by mouth daily as needed for Pain., Disp: 60 Tab, Rfl: 5    escitalopram oxalate (LEXAPRO) 10 mg tablet, Take 1 Tab by mouth daily. , Disp: 60 Tab, Rfl: 5    CALCIUM POLYCARBOPHIL (FIBERCON PO), Take  by mouth., Disp: , Rfl:     Bifidobacterium Infantis (ALIGN) 4 mg cap, Take  by mouth., Disp: , Rfl:     vitamin a-vitamin c-vit e-min (OCUVITE) tablet, Take 1 Tab by mouth daily. , Disp: , Rfl:     aspirin delayed-release 81 mg tablet, Take 81 mg by mouth daily.  R Carotid stenosis/ venous insuff/ venous stasis LLE- per Dr Joslyn Granados continue Aspirin 81 mg- Left msg for Franca Carey MA to Dr Coyle Topher with this information 2/6/15 @ [phone], Disp: , Rfl:    Date Last Reviewed:  10/6/2017     Number of Personal Factors/Comorbidities that affect the Plan of Care: 1-2: MODERATE COMPLEXITY   EXAMINATION:   Palpation: Noted spine curve to the right due to scoliosis    ROM:        bilateral lumbar Rot limited 30%. All other motions WFL. Pain was noted with FB, BB and bilateral SB. With R SB pain felt on right and with left SB pain felt on the left                                    Strength:     R hip flex 4/5, abd 4-/5, quad 4/5, HS 3/5         L hip flex 3/5, HS 3-/5, quad 4-/5, abd 3/5            Special Tests: poor dynamic balance  Neurological Screen: na  Balance:  poor dynamic standing balance   Body Structures Involved:  1. Bones  2. Joints  3. Muscles  4. Ligaments Body Functions Affected:  1. Sensory/Pain  2. Neuromusculoskeletal  3. Movement Related Activities and Participation Affected:  1. Mobility  2. Self Care  3. Domestic Life   Number of elements (examined above) that affect the Plan of Care: 3: MODERATE COMPLEXITY   CLINICAL PRESENTATION:   Presentation: Evolving clinical presentation with changing clinical characteristics: MODERATE COMPLEXITY   CLINICAL DECISION MAKING:   Outcome Measure: Tool Used: Modified Oswestry Low Back Pain Questionnaire  Score:  Initial: 25/50  Most Recent: 22/50 (Date:9/14/17 )   Interpretation of Score: Each section is scored on a 0-5 scale, 5 representing the greatest disability. The scores of each section are added together for a total score of 50. Score 0 1-10 11-20 21-30 31-40 41-49 50   Modifier CH CI CJ CK CL CM CN     ? Mobility - Walking and Moving Around:     - CURRENT STATUS: CK - 40%-59% impaired, limited or restricted    - GOAL STATUS: CJ - 20%-39% impaired, limited or restricted    - D/C STATUS:  ---------------To be determined---------------      Medical Necessity:   · Patient is expected to demonstrate progress in strength, range of motion and balance to improve her balance and ability to ambulate and care for herself in the home.   Reason for Services/Other Comments:  · Patient continues to require skilled intervention due to ongoing pain in the back and legs that is affecting her balance and ability to walk and care for herself . Use of outcome tool(s) and clinical judgement create a POC that gives a: Questionable prediction of patient's progress: MODERATE COMPLEXITY            TREATMENT:   (In addition to Assessment/Re-Assessment sessions the following treatments were rendered)  Pre-treatment Symptoms/Complaints:  Pt reports feeling okay today. Pain: Initial: Pain Intensity 1: 5  Post Session:  4/10     THERAPEUTIC EXERCISE: (55 minutes):  Exercises per grid below to improve mobility, strength and balance. Required minimal verbal cues to promote proper body mechanics. Progressed resistance, range and repetitions as indicated. Nu-step level 0 x 10 min seat 8  Seated LAQ 5# x 30-  Standing marching x 30  Marching on airex x 30  rhomberg stance eyes closed on airex x 1 min-held  Gait training with cane x 200 feet   Gait training outside on sloped area to simulate driveway with cane-held  Sit to stand with overhead ball raise 2# ball x 6 with assistance  Lateral walking with hand held assistance 2 x 25 feet-held  Step ups on 6 inch step x 20    Sit to sands with exercises ball raises x 10 -held  Standing weightshift on to single leg with hand held assistance x 10-held  Standing bilateral hip abduction 3# x 25  Standing bilateral hip extension 3# x 25  Standing hip flexion bilaterally 3# x 20  scap retract green TB x 30-held  Standing t-bar flexion x 15  Step-overs for balance and curb/step negotiation x 10 with SBA-held  Manual Therapy (    0 min  ): na    Therapeutic Modalities: MHP x 10 minutes in sitting while performing nu-step    HEP: As directed. Treatment/Session Assessment:    · Response to Treatment:  Pt reporting the heat helps her back. She was moving slightly better today and had less overall pain. She is using her cane in the home and community. She is working on strength as well as balance and has reported zero falls.   Will continue. · Compliance with Program/Exercises: Will assess as treatment progresses. · Recommendations/Intent for next treatment session: \"Next visit will focus on advancements to more challenging activities\".   Total Treatment Duration: 55 min  PT Patient Time In/Time Out  Time In: 0100  Time Out: 0155  Treatment number 12 Je Palomino PT

## 2017-10-09 ENCOUNTER — HOSPITAL ENCOUNTER (OUTPATIENT)
Dept: PHYSICAL THERAPY | Age: 82
Discharge: HOME | End: 2017-10-09
Attending: HOSPITALIST
Payer: MEDICARE

## 2017-10-09 PROCEDURE — 97110 THERAPEUTIC EXERCISES: CPT

## 2017-10-09 NOTE — PROGRESS NOTES
[facility] [place] [address] [facility]   [address], [address]  [phone]   [phone]    Radha Bobo  : 1922       OUTPATIENT PHYSICAL THERAPY:Daily Note 10/9/17    ICD-10: Treatment Diagnosis: difficulty walking R 26.2, back pain M54.5  Precautions/Allergies:   Latex, natural rubber   Fall Risk Score: 3 (? 5 = High Risk)  MD Orders: Eval and treat MEDICAL/REFERRING DIAGNOSIS:  Radiculopathy, lumbar region [M54.16]  Peripheral vascular disease, unspecified [I73.9]  Lumbago with sciatica, right side [M54.41]  Other chronic pain [G89.29]  Other specific arthropathies, not elsewhere classified, other specified site [M12.88]   DATE OF ONSET: chronic  REFERRING PHYSICIAN: Zacarias Hanson MD  RETURN PHYSICIAN APPOINTMENT: as needed     INITIAL ASSESSMENT:  Ms. Lacey Jung presents with chronic back pain due to  Scoliosis, DDD and spondylosis. She has had difficulty with her back for years and has received prior injections by Dr. Cassidy Mitchell which have helped for a while. She does report her pain can extend into her legs and she has had worsening time with her walking and balance. Dr. Gallo Sosa has instructed pt to begin using a cane in order to decrease her fall risk. Pt would like to work on her overall strength and balance so she feels more comfortable with standing and walking and to help improve her overall mobility. PROBLEM LIST (Impacting functional limitations):  1. Decreased Strength  2. Decreased ADL/Functional Activities  3. Decreased Ambulation Ability/Technique  4. Decreased Balance  5. Increased Pain  6. Decreased Activity Tolerance  7. Decreased Flexibility/Joint Mobility INTERVENTIONS PLANNED:  1. Balance Exercise  2. Gait Training  3. Heat  4. Home Exercise Program (HEP)  5. Range of Motion (ROM)  6. Therapeutic Exercise/Strengthening   TREATMENT PLAN:  Effective Dates: 17 TO 10/17/17.   Frequency/Duration: 2 times a week for 10 weeks and upon reassessment,  will adjust frequency and duration as progress indicates. GOALS: (Goals have been discussed and agreed upon with patient.)  Short-Term Functional Goals: Time Frame: 5 weeks  1. Establish independent HEP with no cueing.-met  2. Pt will be able to report zero falls. -met  3. Pt will increase strength by 1/2 grade in order to rise from chair with more ease and less attempts.-in progress  Discharge Goals: Time Frame: 10 weeks  1. Improve score on Oswestry by at least 5-8 points for improved ADL function. 2. Pt will be able to demonstrate improved gait mechanics with use of cane and proper use of cane.-in progress  3. Pt will be able to maintain a more erect standing or sitting posture to limit kyphosis. -in progress  4. Pt will be able to walk for at least 10 minutes prior to sitting down.-in progress  Rehabilitation Potential For Stated Goals: [facility]'s therapy, I certify that the treatment plan above will be carried out by a therapist or under their direction. Thank you for this referral,  Arun Lanier, PT     Referring Physician Signature: Jaclyn Friedman MD              Date                    The information in this section was collected on 8/8/17 (except where otherwise noted). HISTORY:   History of Present Injury/Illness (Reason for Referral):  Ms. Rachel Cleveland , 80years of age, presents with chronic back pain due to  Scoliosis, DDD and spondylosis. She has had difficulty with her back for years and has received prior injections by Dr. Fifi Pollock which have helped for a while. She does report her pain can extend into her legs and she has had worsening time with her walking and balance. Dr. Eulalia Roy has instructed pt to begin using a cane in order to decrease her fall risk. Pt would like to work on her overall strength and balance so she feels more comfortable with standing and walking and to help improve her overall mobility.   Past Medical History/Comorbidities:   Ms. Robbin Maloney  has a past medical history of Anxiety; Arthritis; Carotid artery stenosis; Depression; [place] (hard of hearing); Hypoglycemia; Leg fracture, left (2009); PUD (peptic ulcer disease); Venous insufficiency; Venous stasis of lower extremity; and Venous ulcer of leg ([facility] [address] Ms. Robbin Maloney  has a past surgical history that includes colonoscopy (2008); cataract removal (Bilateral); tonsillectomy; total knee arthroplasty (Left, 2000); total hip arthroplasty (Right, 2008); and orthopaedic (Left, 2009). Social History/Living Environment:     Pt lives independently in a one level home. She continues to drive  Prior Level of Function/Work/Activity:  Pt is retired. Dominant Side:         RIGHT    Current Medications:       Current Outpatient Prescriptions:     gabapentin (NEURONTIN) 100 mg capsule, Take 1 Cap by mouth See Admin Instructions. Take 1 tablet Po QHS for 1 week. If no excess sedation then increase to 2 tablets PO QHS. , Disp: 60 Cap, Rfl: 3    cyanocobalamin 1,000 mcg tablet, Take 1,000 mcg by mouth daily. , Disp: , Rfl:     meloxicam (MOBIC) 7.5 mg tablet, Take 1-2 Tabs by mouth daily as needed for Pain., Disp: 60 Tab, Rfl: 5    escitalopram oxalate (LEXAPRO) 10 mg tablet, Take 1 Tab by mouth daily. , Disp: 60 Tab, Rfl: 5    CALCIUM POLYCARBOPHIL (FIBERCON PO), Take  by mouth., Disp: , Rfl:     Bifidobacterium Infantis (ALIGN) 4 mg cap, Take  by mouth., Disp: , Rfl:     vitamin a-vitamin c-vit e-min (OCUVITE) tablet, Take 1 Tab by mouth daily. , Disp: , Rfl:     aspirin delayed-release 81 mg tablet, Take 81 mg by mouth daily.  R Carotid stenosis/ venous insuff/ venous stasis LLE- per Dr Isaura Almendarez continue Aspirin 81 mg- Left msg for Ronda Reed MA to Dr Imer Rene with this information 2/6/15 @ [phone], Disp: , Rfl:    Date Last Reviewed:  10/9/2017     Number of Personal Factors/Comorbidities that affect the Plan of Care: 1-2: MODERATE COMPLEXITY   EXAMINATION:   Palpation: Noted spine curve to the right due to scoliosis    ROM:        bilateral lumbar Rot limited 30%. All other motions WFL. Pain was noted with FB, BB and bilateral SB. With R SB pain felt on right and with left SB pain felt on the left                                    Strength:     R hip flex 4/5, abd 4-/5, quad 4/5, HS 3/5         L hip flex 3/5, HS 3-/5, quad 4-/5, abd 3/5            Special Tests: poor dynamic balance  Neurological Screen: na  Balance:  poor dynamic standing balance   Body Structures Involved:  1. Bones  2. Joints  3. Muscles  4. Ligaments Body Functions Affected:  1. Sensory/Pain  2. Neuromusculoskeletal  3. Movement Related Activities and Participation Affected:  1. Mobility  2. Self Care  3. Domestic Life   Number of elements (examined above) that affect the Plan of Care: 3: MODERATE COMPLEXITY   CLINICAL PRESENTATION:   Presentation: Evolving clinical presentation with changing clinical characteristics: MODERATE COMPLEXITY   CLINICAL DECISION MAKING:   Outcome Measure: Tool Used: Modified Oswestry Low Back Pain Questionnaire  Score:  Initial: 25/50  Most Recent: 22/50 (Date:9/14/17 )   Interpretation of Score: Each section is scored on a 0-5 scale, 5 representing the greatest disability. The scores of each section are added together for a total score of 50. Score 0 1-10 11-20 21-30 31-40 41-49 50   Modifier CH CI CJ CK CL CM CN     ? Mobility - Walking and Moving Around:     - CURRENT STATUS: CK - 40%-59% impaired, limited or restricted    - GOAL STATUS: CJ - 20%-39% impaired, limited or restricted    - D/C STATUS:  ---------------To be determined---------------      Medical Necessity:   · Patient is expected to demonstrate progress in strength, range of motion and balance to improve her balance and ability to ambulate and care for herself in the home.   Reason for Services/Other Comments:  · Patient continues to require skilled intervention due to ongoing pain in the back and legs that is affecting her balance and ability to walk and care for herself . Use of outcome tool(s) and clinical judgement create a POC that gives a: Questionable prediction of patient's progress: MODERATE COMPLEXITY            TREATMENT:   (In addition to Assessment/Re-Assessment sessions the following treatments were rendered)  Pre-treatment Symptoms/Complaints:  Pt reports feeling fairly well. Pain: Initial: Pain Intensity 1: 4  Post Session:  4/10     THERAPEUTIC EXERCISE: (50 minutes):  Exercises per grid below to improve mobility, strength and balance. Required minimal verbal cues to promote proper body mechanics. Progressed resistance, range and repetitions as indicated. Nu-step level 0 x 10 min seat 8  Seated LAQ 5# x 30-held  Standing marching x 30  Marching on airex x 30  rhomberg stance eyes closed on airex x 1 min-held  Gait training with cane x 200 feet   Gait training outside on sloped area to simulate driveway with cane-held  Sit to stand with overhead ball raise 2# ball x 6 with assistance-held  Lateral walking with hand held assistance 2 x 25 feet  Step ups on 6 inch step x 20    Sit to sands with exercises ball raises x 10 -held  Standing weightshift on to single leg with hand held assistance x 10-held  Standing bilateral hip abduction 3# x 25  Standing bilateral hip extension 3# x 25  Standing hip flexion bilaterally 3# x 20  scap retract green TB x 30-held  Standing t-bar flexion x 15  Cone Step-overs for balance and curb/step negotiation x 15  rhomberg stance with med ball rotation x 15 4# medball  Manual Therapy (    0 min  ): na    Therapeutic Modalities: MHP x 10 minutes in sitting while performing nu-step    HEP: As directed. Treatment/Session Assessment:    · Response to Treatment:  Pt reporting feeling better overall. She was able to complete cone step-overs with very little assistance and maintained her balance.   She did report getting tired with treatment and requested to stop early. She was assisted to her car safely. She had no other issues with therapy and continues to repot the therapy is helping. · Compliance with Program/Exercises: Will assess as treatment progresses. · Recommendations/Intent for next treatment session: \"Next visit will focus on advancements to more challenging activities\".   Total Treatment Duration: 50 min  PT Patient Time In/Time Out  Time In: 1115  Time Out: 1205  Treatment number 1800 [facility], PT

## 2017-10-12 ENCOUNTER — HOSPITAL ENCOUNTER (OUTPATIENT)
Dept: PHYSICAL THERAPY | Age: 82
Discharge: HOME | End: 2017-10-12
Attending: HOSPITALIST
Payer: MEDICARE

## 2017-10-12 NOTE — PROGRESS NOTES
[facility]   [address], [facility] [address]  [phone]   [phone]    OUTPATIENT DAILY NOTE    NAME/AGE/GENDER: Lake Rivera is a 80 y.o. female. DATE: 10/12/2017    Patient cancelled today due to another medical appointment. Will plan to follow up on next scheduled visit.     Janeth Hinojosa, PT

## 2017-10-18 ENCOUNTER — HOSPITAL ENCOUNTER (OUTPATIENT)
Dept: GENERAL RADIOLOGY | Age: 82
Discharge: HOME | End: 2017-10-18
Payer: MEDICARE

## 2017-10-18 DIAGNOSIS — G89.29 CHRONIC RIGHT-SIDED LOW BACK PAIN WITH RIGHT-SIDED SCIATICA: ICD-10-CM

## 2017-10-18 DIAGNOSIS — I73.9 CLAUDICATION (HCC): ICD-10-CM

## 2017-10-18 DIAGNOSIS — M47.817 FACET ARTHROPATHY, LUMBOSACRAL: ICD-10-CM

## 2017-10-18 DIAGNOSIS — M54.16 BILATERAL LUMBAR RADICULOPATHY: ICD-10-CM

## 2017-10-18 DIAGNOSIS — M54.41 CHRONIC RIGHT-SIDED LOW BACK PAIN WITH RIGHT-SIDED SCIATICA: ICD-10-CM

## 2017-10-18 PROCEDURE — 72110 X-RAY EXAM L-2 SPINE 4/>VWS: CPT

## 2017-11-07 NOTE — PROGRESS NOTES
[facility]rapy Center at [facility]   [address], [address]  [phone]   [phone]    Evelyn Jeffrey  : 1922       OUTPATIENT PHYSICAL THERAPY:Discontinuation Summary 17    ICD-10: Treatment Diagnosis: difficulty walking R 26.2, back pain M54.5  Precautions/Allergies:   Latex, natural rubber   Fall Risk Score: 3 (? 5 = High Risk)  MD Orders: Eval and treat MEDICAL/REFERRING DIAGNOSIS:  Radiculopathy, lumbar region [M54.16]  Peripheral vascular disease, unspecified [I73.9]  Lumbago with sciatica, right side [M54.41]  Other chronic pain [G89.29]  Other specific arthropathies, not elsewhere classified, other specified site [M12.88]   DATE OF ONSET: chronic  REFERRING PHYSICIAN: Diamond Jennings MD  RETURN PHYSICIAN APPOINTMENT: as needed     INITIAL ASSESSMENT:  Ms. Valeria Melendez presents with chronic back pain due to  Scoliosis, DDD and spondylosis. She has had difficulty with her back for years and has received prior injections by Dr. Sebastián Aguiar which have helped for a while. She does report her pain can extend into her legs and she has had worsening time with her walking and balance. Dr. Annmarie Gonzalez has instructed pt to begin using a cane in order to decrease her fall risk. Pt would like to work on her overall strength and balance so she feels more comfortable with standing and walking and to help improve her overall mobility. PROBLEM LIST (Impacting functional limitations):  1. Decreased Strength  2. Decreased ADL/Functional Activities  3. Decreased Ambulation Ability/Technique  4. Decreased Balance  5. Increased Pain  6. Decreased Activity Tolerance  7. Decreased Flexibility/Joint Mobility INTERVENTIONS PLANNED:  1. Balance Exercise  2. Gait Training  3. Heat  4. Home Exercise Program (HEP)  5. Range of Motion (ROM)  6. Therapeutic Exercise/Strengthening   TREATMENT PLAN:  Effective Dates: 17 TO 10/17/17.   Frequency/Duration: 2 times a week for 10 weeks and upon reassessment,  will adjust frequency and duration as progress indicates. GOALS: (Goals have been discussed and agreed upon with patient.)  Short-Term Functional Goals: Time Frame: 5 weeks  1. Establish independent HEP with no cueing.-met  2. Pt will be able to report zero falls. -met  3. Pt will increase strength by 1/2 grade in order to rise from chair with more ease and less attempts.-in progress  Discharge Goals: Time Frame: 10 weeks  1. Improve score on Oswestry by at least 5-8 points for improved ADL function.-in progress  2. Pt will be able to demonstrate improved gait mechanics with use of cane and proper use of cane.-in progress  3. Pt will be able to maintain a more erect standing or sitting posture to limit kyphosis. -in progress  4. Pt will be able to walk for at least 10 minutes prior to sitting down.-in progress  Rehabilitation Potential For Stated Goals: [facility]'s therapy, I certify that the treatment plan above will be carried out by a therapist or under their direction. Thank you for this referral,  Miki Mcnulty, PT     Referring Physician Signature: Rachell Villalobos MD              Rafael Ruiz was seen in physical therapy from 8/8/17 to 10/9/17 for 14 visits. She cancelled her last appointment and has not returned. Attempted to call pt, but had to leave a message and have not heard back from her. Upon further chart review, pt saw Dr. Major Rubinstein and it appears she may be undergoing lumbar surgery and will not be returning to therapy at this time. She was able to walk with slight more ease with her cane and she was able to report zero alls while attending therapy.    Thank you for this referral.       YANCI DuranT

## 2017-11-27 ENCOUNTER — HOSPITAL ENCOUNTER (OUTPATIENT)
Dept: SURGERY | Age: 82
Discharge: HOME | End: 2017-11-27
Payer: MEDICARE

## 2017-11-27 VITALS
HEART RATE: 72 BPM | HEIGHT: 67 IN | BODY MASS INDEX: 21.4 KG/M2 | SYSTOLIC BLOOD PRESSURE: 196 MMHG | WEIGHT: 136.38 LBS | OXYGEN SATURATION: 97 % | TEMPERATURE: 98.1 F | DIASTOLIC BLOOD PRESSURE: 82 MMHG | RESPIRATION RATE: 16 BRPM

## 2017-11-27 LAB
BACTERIA SPEC CULT: NORMAL
SERVICE CMNT-IMP: NORMAL

## 2017-11-27 PROCEDURE — 87641 MR-STAPH DNA AMP PROBE: CPT | Performed by: NEUROLOGICAL SURGERY

## 2017-11-27 RX ORDER — GABAPENTIN 100 MG/1
100 CAPSULE ORAL
COMMUNITY
End: 2018-10-08

## 2017-11-27 NOTE — PERIOP NOTES
Patient verified name, , and surgery as listed in [facility]. Patient provided medical/health information and PTA medications to the best of their ability. TYPE  CASE: lB  Orders per surgeon: were received  Labs per surgeon: per anesthesia. Labs per anesthesia protocol:no additional  EKG:  EKG is not required per protocol. Patient denies any chest pain or shortness of breath on exertion     Nasal Swab collected per MD order and instructions for Mupirocin nasal ointment if required. Patient provided with and instructed on education handouts including Guide to Surgery, blood transfusions, pain management, and hand hygiene for the family and community, and [facility] brochure. Road to Recovery Spine surgery patient guide given. Instructed on incentive spirometry with return demonstration. Long handled prehab sponge given with instructions for use. Patient viewed spine prehab video. Hibiclens and instructions given per hospital policy. Instructed patient to continue previous medications as prescribed prior to surgery unless otherwise directed and to take the following medications the day of surgery according to anesthesia guidelines : lexapro . Instructed patient to hold  the following medications on the day of surgery: all vitamins,supplement and nsaids including meloxicam    Original medication prescription bottles were  visualized during patient appointment. Patient teach back successful and patient demonstrates knowledge of instruction.

## 2017-11-30 ENCOUNTER — APPOINTMENT (OUTPATIENT)
Dept: GENERAL RADIOLOGY | Age: 82
End: 2017-11-30
Attending: NEUROLOGICAL SURGERY
Payer: MEDICARE

## 2017-11-30 ENCOUNTER — ANESTHESIA (OUTPATIENT)
Dept: SURGERY | Age: 82
End: 2017-11-30
Payer: MEDICARE

## 2017-11-30 ENCOUNTER — ANESTHESIA EVENT (OUTPATIENT)
Dept: SURGERY | Age: 82
End: 2017-11-30
Payer: MEDICARE

## 2017-11-30 ENCOUNTER — HOSPITAL ENCOUNTER (OUTPATIENT)
Age: 82
Setting detail: OUTPATIENT SURGERY
Discharge: HOME | End: 2017-11-30
Attending: NEUROLOGICAL SURGERY | Admitting: NEUROLOGICAL SURGERY
Payer: MEDICARE

## 2017-11-30 VITALS
TEMPERATURE: 98.5 F | BODY MASS INDEX: 21.47 KG/M2 | RESPIRATION RATE: 18 BRPM | OXYGEN SATURATION: 98 % | SYSTOLIC BLOOD PRESSURE: 141 MMHG | DIASTOLIC BLOOD PRESSURE: 65 MMHG | HEART RATE: 58 BPM | WEIGHT: 136.8 LBS | HEIGHT: 67 IN

## 2017-11-30 PROCEDURE — 77030003666 HC NDL SPINAL BD -A: Performed by: NEUROLOGICAL SURGERY

## 2017-11-30 PROCEDURE — 77030030163 HC BN WAX J&J -A: Performed by: NEUROLOGICAL SURGERY

## 2017-11-30 PROCEDURE — 76010000171 HC OR TIME 2 TO 2.5 HR INTENSV-TIER 1: Performed by: NEUROLOGICAL SURGERY

## 2017-11-30 PROCEDURE — 74011000250 HC RX REV CODE- 250

## 2017-11-30 PROCEDURE — 77030008477 HC STYL SATN SLP COVD -A: Performed by: ANESTHESIOLOGY

## 2017-11-30 PROCEDURE — 77030032490 HC SLV COMPR SCD KNE COVD -B: Performed by: NEUROLOGICAL SURGERY

## 2017-11-30 PROCEDURE — 77030019940 HC BLNKT HYPOTHRM STRY -B: Performed by: ANESTHESIOLOGY

## 2017-11-30 PROCEDURE — 74011250637 HC RX REV CODE- 250/637: Performed by: ANESTHESIOLOGY

## 2017-11-30 PROCEDURE — 74011250636 HC RX REV CODE- 250/636: Performed by: NEUROLOGICAL SURGERY

## 2017-11-30 PROCEDURE — 77030010507 HC ADH SKN DERMBND J&J -B: Performed by: NEUROLOGICAL SURGERY

## 2017-11-30 PROCEDURE — 74011250636 HC RX REV CODE- 250/636: Performed by: ANESTHESIOLOGY

## 2017-11-30 PROCEDURE — 77030013292 HC BOWL MX PRSM J&J -A: Performed by: ANESTHESIOLOGY

## 2017-11-30 PROCEDURE — 77030012894: Performed by: NEUROLOGICAL SURGERY

## 2017-11-30 PROCEDURE — 74011250636 HC RX REV CODE- 250/636

## 2017-11-30 PROCEDURE — 77030019908 HC STETH ESOPH SIMS -A: Performed by: ANESTHESIOLOGY

## 2017-11-30 PROCEDURE — 77030014007 HC SPNG HEMSTAT J&J -B: Performed by: NEUROLOGICAL SURGERY

## 2017-11-30 PROCEDURE — 77030011640 HC PAD GRND REM COVD -A: Performed by: NEUROLOGICAL SURGERY

## 2017-11-30 PROCEDURE — 77030004391 HC BUR FLUT MEDT -C: Performed by: NEUROLOGICAL SURGERY

## 2017-11-30 PROCEDURE — 77030018836 HC SOL IRR NACL ICUM -A: Performed by: NEUROLOGICAL SURGERY

## 2017-11-30 PROCEDURE — 77030013794 HC KT TRNSDUC BLD EDWD -B: Performed by: ANESTHESIOLOGY

## 2017-11-30 PROCEDURE — 74011000250 HC RX REV CODE- 250: Performed by: NEUROLOGICAL SURGERY

## 2017-11-30 PROCEDURE — 76210000016 HC OR PH I REC 1 TO 1.5 HR: Performed by: NEUROLOGICAL SURGERY

## 2017-11-30 PROCEDURE — 77030008703 HC TU ET UNCUF COVD -A: Performed by: ANESTHESIOLOGY

## 2017-11-30 PROCEDURE — 77030020782 HC GWN BAIR PAWS FLX 3M -B: Performed by: ANESTHESIOLOGY

## 2017-11-30 PROCEDURE — 77030003029 HC SUT VCRL J&J -B: Performed by: NEUROLOGICAL SURGERY

## 2017-11-30 PROCEDURE — 76060000035 HC ANESTHESIA 2 TO 2.5 HR: Performed by: NEUROLOGICAL SURGERY

## 2017-11-30 PROCEDURE — 76210000021 HC REC RM PH II 0.5 TO 1 HR: Performed by: NEUROLOGICAL SURGERY

## 2017-11-30 PROCEDURE — 77030021678 HC GLIDESCP STAT DISP VERT -B: Performed by: ANESTHESIOLOGY

## 2017-11-30 RX ORDER — LIDOCAINE HYDROCHLORIDE 20 MG/ML
INJECTION, SOLUTION EPIDURAL; INFILTRATION; INTRACAUDAL; PERINEURAL AS NEEDED
Status: DISCONTINUED | OUTPATIENT
Start: 2017-11-30 | End: 2017-11-30 | Stop reason: HOSPADM

## 2017-11-30 RX ORDER — ACETAMINOPHEN 325 MG/1
650 TABLET ORAL ONCE
Status: COMPLETED | OUTPATIENT
Start: 2017-11-30 | End: 2017-11-30

## 2017-11-30 RX ORDER — HYDROCODONE BITARTRATE AND ACETAMINOPHEN 5; 325 MG/1; MG/1
TABLET ORAL
Qty: 30 TAB | Refills: 0 | Status: SHIPPED | OUTPATIENT
Start: 2017-11-30 | End: 2018-03-06 | Stop reason: ALTCHOICE

## 2017-11-30 RX ORDER — SODIUM CHLORIDE 0.9 % (FLUSH) 0.9 %
5-10 SYRINGE (ML) INJECTION AS NEEDED
Status: DISCONTINUED | OUTPATIENT
Start: 2017-11-30 | End: 2017-11-30 | Stop reason: HOSPADM

## 2017-11-30 RX ORDER — CEFAZOLIN SODIUM 1 G/3ML
INJECTION, POWDER, FOR SOLUTION INTRAMUSCULAR; INTRAVENOUS AS NEEDED
Status: DISCONTINUED | OUTPATIENT
Start: 2017-11-30 | End: 2017-11-30 | Stop reason: HOSPADM

## 2017-11-30 RX ORDER — NEOSTIGMINE METHYLSULFATE 1 MG/ML
INJECTION INTRAVENOUS AS NEEDED
Status: DISCONTINUED | OUTPATIENT
Start: 2017-11-30 | End: 2017-11-30 | Stop reason: HOSPADM

## 2017-11-30 RX ORDER — FENTANYL CITRATE 50 UG/ML
INJECTION, SOLUTION INTRAMUSCULAR; INTRAVENOUS AS NEEDED
Status: DISCONTINUED | OUTPATIENT
Start: 2017-11-30 | End: 2017-11-30 | Stop reason: HOSPADM

## 2017-11-30 RX ORDER — PROPOFOL 10 MG/ML
INJECTION, EMULSION INTRAVENOUS AS NEEDED
Status: DISCONTINUED | OUTPATIENT
Start: 2017-11-30 | End: 2017-11-30 | Stop reason: HOSPADM

## 2017-11-30 RX ORDER — SODIUM CHLORIDE, SODIUM LACTATE, POTASSIUM CHLORIDE, CALCIUM CHLORIDE 600; 310; 30; 20 MG/100ML; MG/100ML; MG/100ML; MG/100ML
100 INJECTION, SOLUTION INTRAVENOUS CONTINUOUS
Status: DISCONTINUED | OUTPATIENT
Start: 2017-11-30 | End: 2017-11-30 | Stop reason: HOSPADM

## 2017-11-30 RX ORDER — CEFAZOLIN SODIUM IN 0.9 % NACL 2 G/50 ML
2 INTRAVENOUS SOLUTION, PIGGYBACK (ML) INTRAVENOUS ONCE
Status: COMPLETED | OUTPATIENT
Start: 2017-11-30 | End: 2017-11-30

## 2017-11-30 RX ORDER — SODIUM CHLORIDE 0.9 % (FLUSH) 0.9 %
5-10 SYRINGE (ML) INJECTION EVERY 8 HOURS
Status: DISCONTINUED | OUTPATIENT
Start: 2017-11-30 | End: 2017-11-30 | Stop reason: HOSPADM

## 2017-11-30 RX ORDER — ROCURONIUM BROMIDE 10 MG/ML
INJECTION, SOLUTION INTRAVENOUS AS NEEDED
Status: DISCONTINUED | OUTPATIENT
Start: 2017-11-30 | End: 2017-11-30 | Stop reason: HOSPADM

## 2017-11-30 RX ORDER — HYDROMORPHONE HYDROCHLORIDE 2 MG/ML
0.5 INJECTION, SOLUTION INTRAMUSCULAR; INTRAVENOUS; SUBCUTANEOUS
Status: DISCONTINUED | OUTPATIENT
Start: 2017-11-30 | End: 2017-11-30 | Stop reason: HOSPADM

## 2017-11-30 RX ORDER — LIDOCAINE HYDROCHLORIDE 10 MG/ML
0.1 INJECTION INFILTRATION; PERINEURAL AS NEEDED
Status: DISCONTINUED | OUTPATIENT
Start: 2017-11-30 | End: 2017-11-30 | Stop reason: HOSPADM

## 2017-11-30 RX ORDER — ONDANSETRON 2 MG/ML
INJECTION INTRAMUSCULAR; INTRAVENOUS AS NEEDED
Status: DISCONTINUED | OUTPATIENT
Start: 2017-11-30 | End: 2017-11-30 | Stop reason: HOSPADM

## 2017-11-30 RX ORDER — LIDOCAINE HYDROCHLORIDE AND EPINEPHRINE 10; 10 MG/ML; UG/ML
INJECTION, SOLUTION INFILTRATION; PERINEURAL AS NEEDED
Status: DISCONTINUED | OUTPATIENT
Start: 2017-11-30 | End: 2017-11-30 | Stop reason: HOSPADM

## 2017-11-30 RX ORDER — NALOXONE HYDROCHLORIDE 0.4 MG/ML
0.1 INJECTION, SOLUTION INTRAMUSCULAR; INTRAVENOUS; SUBCUTANEOUS AS NEEDED
Status: DISCONTINUED | OUTPATIENT
Start: 2017-11-30 | End: 2017-11-30 | Stop reason: HOSPADM

## 2017-11-30 RX ORDER — GUAIFENESIN 100 MG/5ML
81 LIQUID (ML) ORAL ONCE
Status: COMPLETED | OUTPATIENT
Start: 2017-11-30 | End: 2017-11-30

## 2017-11-30 RX ORDER — AMOXICILLIN 250 MG
2 CAPSULE ORAL DAILY
Qty: 30 TAB | Refills: 1 | Status: SHIPPED | OUTPATIENT
Start: 2017-11-30 | End: 2019-06-10 | Stop reason: ALTCHOICE

## 2017-11-30 RX ORDER — ESMOLOL HYDROCHLORIDE 10 MG/ML
INJECTION INTRAVENOUS AS NEEDED
Status: DISCONTINUED | OUTPATIENT
Start: 2017-11-30 | End: 2017-11-30 | Stop reason: HOSPADM

## 2017-11-30 RX ORDER — GLYCOPYRROLATE 0.2 MG/ML
INJECTION INTRAMUSCULAR; INTRAVENOUS AS NEEDED
Status: DISCONTINUED | OUTPATIENT
Start: 2017-11-30 | End: 2017-11-30 | Stop reason: HOSPADM

## 2017-11-30 RX ADMIN — ASPIRIN 81 MG 81 MG: 81 TABLET ORAL at 09:36

## 2017-11-30 RX ADMIN — CEFAZOLIN 2 G: 1 INJECTION, POWDER, FOR SOLUTION INTRAMUSCULAR; INTRAVENOUS; PARENTERAL at 11:15

## 2017-11-30 RX ADMIN — LIDOCAINE HYDROCHLORIDE 100 MG: 20 INJECTION, SOLUTION EPIDURAL; INFILTRATION; INTRACAUDAL; PERINEURAL at 11:09

## 2017-11-30 RX ADMIN — PROPOFOL 150 MG: 10 INJECTION, EMULSION INTRAVENOUS at 11:09

## 2017-11-30 RX ADMIN — ROCURONIUM BROMIDE 35 MG: 10 INJECTION, SOLUTION INTRAVENOUS at 11:09

## 2017-11-30 RX ADMIN — FENTANYL CITRATE 25 MCG: 50 INJECTION, SOLUTION INTRAMUSCULAR; INTRAVENOUS at 11:49

## 2017-11-30 RX ADMIN — ONDANSETRON 4 MG: 2 INJECTION INTRAMUSCULAR; INTRAVENOUS at 12:36

## 2017-11-30 RX ADMIN — ESMOLOL HYDROCHLORIDE 10 MG: 10 INJECTION INTRAVENOUS at 12:58

## 2017-11-30 RX ADMIN — ACETAMINOPHEN 650 MG: 325 TABLET ORAL at 09:23

## 2017-11-30 RX ADMIN — SODIUM CHLORIDE, SODIUM LACTATE, POTASSIUM CHLORIDE, AND CALCIUM CHLORIDE 100 ML/HR: 600; 310; 30; 20 INJECTION, SOLUTION INTRAVENOUS at 08:57

## 2017-11-30 RX ADMIN — ROCURONIUM BROMIDE 15 MG: 10 INJECTION, SOLUTION INTRAVENOUS at 11:53

## 2017-11-30 RX ADMIN — NEOSTIGMINE METHYLSULFATE 3 MG: 1 INJECTION INTRAVENOUS at 12:52

## 2017-11-30 RX ADMIN — FENTANYL CITRATE 25 MCG: 50 INJECTION, SOLUTION INTRAMUSCULAR; INTRAVENOUS at 12:01

## 2017-11-30 RX ADMIN — SODIUM CHLORIDE, SODIUM LACTATE, POTASSIUM CHLORIDE, AND CALCIUM CHLORIDE: 600; 310; 30; 20 INJECTION, SOLUTION INTRAVENOUS at 12:40

## 2017-11-30 RX ADMIN — FENTANYL CITRATE 25 MCG: 50 INJECTION, SOLUTION INTRAMUSCULAR; INTRAVENOUS at 11:21

## 2017-11-30 RX ADMIN — GLYCOPYRROLATE 0.4 MG: 0.2 INJECTION INTRAMUSCULAR; INTRAVENOUS at 12:52

## 2017-11-30 NOTE — OP NOTES
Felicity Macdonald [id number]  [id number]    4/8/1922  80 y.o.  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 Right L3-L4 and L4-L5 partial hemilaminectomy, medial facetectomy and foraminotomy for decompression of nerves. 2. Microsurgery. 3. Intraoperative fluoroscopic guidance. Procedure in Detail:   After appropriate informed consent was obtained, the patient was taken to the operating suite where satisfactory general anesthesia was induced. The patient was then turned into the prone position on the operating table on chest rolls. All pressure points were carefully padded. Perioperative antibiotics were given. The lumbar region was prepped and draped in the usual sterile fashion. Intraoperative fluoroscopy was used to localize the L4-L5 level. The skin was infiltrated with 1% lidocaine with epinephrine. A 16 mm vertical linear incision was then made directly over the L4-L5 level 1.5 cm to the right of midline. Dissection was carried down through the subcutaneous tissue. The fascia was perforated using a K wire. The sequential dilators were passed over the K wire and were docked on the L4-L5 laminae under fluoroscopic guidance. A tubular retractor was passed over the dilators and was also docked on the L4-L5 laminae under fluoroscopic guidance. The retractor was secured firmly in place using the articulating arm. The dilators were removed from the wound. The operating microscope was brought into the field. Next, a small amount of soft tissue was removed from the right L4-L5 laminae. The Midas-Patrice high speed drill and Kerrison rongeur were used to perform a right L4-L5 partial hemilaminectomy. A medial facetectomy was also carried out.   The thickened ligament was removed with care being given to protection of the underlying neural elements. The right L5 nerve root was identified and was gently mobilized using microsurgical technique. A generous foraminotomy was carried out over the right L5 nerve root. The nerves were then inspected and were found to be completely free. The wound was then irrigated copiously with antibiotic solution. Meticulous hemostasis was obtained. The operating microscope was removed from the field. The tubular retractor was removed from the wound. The fascia was closed using a single 0 Vicryl suture. The subcutaneous tissue was closed in a layered fashion. The exact same procedure was then carried out on the right side at L3-L4 with the same findings and results. The skin edges were approximated using Dermabond. A sterile dressing was applied overall. The patient was then turned back into the supine position on the stretcher where satisfactory general anesthesia was reversed. The patient was then taken to the recovery room in satisfactory condition. Estimated Blood Loss:   25cc           Specimens:  None    Drains: None                Complications: None    Counts: Sponge and needle counts were correct times two.     Signed By:  Josue Dunn MD     November 30, 2017

## 2017-11-30 NOTE — INTERVAL H&P NOTE
H&P Update:  Bri Gatica was seen and examined. History and physical has been reviewed. The patient has been examined.  There have been no significant clinical changes since the completion of the originally dated History and Physical.    Signed By: Jessica Spence MD     November 30, 2017 10:25 AM

## 2017-11-30 NOTE — H&P
History of Present Illness        Note: Patient is a 80 y.o. female who is being seen at the request of Alexander Reid, * for evaluation of low back pain. Patient denies prior lumbar surgery. Patient is [place]. Patient reports onset long time ago with progressive worsening which limited ability to ambulate a far distance due to back pain and associated bilateral lower extremity pain - right more than left. Pain is located in lower back with radiation down in lower extremities in no particular distribution. Describes pain as sharp. Pain increased by walking and especially worse in the am but decreased by sitting. Patient denies associated lower extremity weakness or tingling but reports numbness in her feet at nights. Denies gait instability but states her walk shows her age, no bowel or bladder dysfunction. Patient has had the following interventions physical therapy with benefit years ago but nothing recently. Prior lumbar LIZZY's by Dr Radha Franks were short lived in terms of benefit. For pain, patient is currently taking Mobic which helps for just a short time.   She gives history of lower extremity swelling with venous ulcer - she wears compression stockings daily.              Allergies   Allergen Reactions    Latex, Natural Rubber Rash       Patient stated she has latex allergy 8/4/09              Past Medical History:   Diagnosis Date    Anxiety      Arthritis       OA- generalized- severe    Carotid artery stenosis       Right/ 100% block per pt    Depression       controlle with daily meds    [place] (hard of hearing)      Hypoglycemia      Leg fracture, left 2009    PUD (peptic ulcer disease)      Venous insufficiency      Venous stasis of lower extremity       left- as reported by pt    Venous ulcer of leg ([facility] [address]       s/p autologous skin graft 10/07         Social History            Social History    Marital status: SINGLE       Spouse name: N/A    Number of children: N/A    Years of education: N/A          Occupational History    Not on file.           Social History Main Topics    Smoking status: Never Smoker    Smokeless tobacco: Never Used    Alcohol use No    Drug use: No    Sexual activity: Not Currently           Other Topics Concern    Not on file      Social History Narrative                Current Outpatient Prescriptions   Medication Sig Dispense Refill    gabapentin (NEURONTIN) 100 mg capsule Take 1 Cap by mouth See Admin Instructions. Take 1 tablet Po QHS for 1 week. If no excess sedation then increase to 2 tablets PO QHS. 60 Cap 3    cyanocobalamin 1,000 mcg tablet Take 1,000 mcg by mouth daily.        meloxicam (MOBIC) 7.5 mg tablet Take 1-2 Tabs by mouth daily as needed for Pain. 60 Tab 5    escitalopram oxalate (LEXAPRO) 10 mg tablet Take 1 Tab by mouth daily. 60 Tab 5    CALCIUM POLYCARBOPHIL (FIBERCON PO) Take  by mouth.        Bifidobacterium Infantis (ALIGN) 4 mg cap Take  by mouth.        vitamin a-vitamin c-vit e-min (OCUVITE) tablet Take 1 Tab by mouth daily.        aspirin delayed-release 81 mg tablet Take 81 mg by mouth daily.  R Carotid stenosis/ venous insuff/ venous stasis LLE- per Dr Susie Olsen continue Aspirin 81 mg- Left msg for Zhang Better MA to Dr Dolly Avina with this information 2/6/15 @ 9615                    Family History   Problem Relation Age of Onset    Colon Cancer Father      Cancer Brother         bladder    Cancer Brother 80       bone cancer    Heart Disease Brother      Stroke Brother      Arthritis-osteo Other      Cancer Other      Heart Disease Other      Malignant Hyperthermia Neg Hx      Pseudocholinesterase Deficiency Neg Hx      Delayed Awakening Neg Hx      Post-op Nausea/Vomiting Neg Hx      Emergence Delirium Neg Hx      Post-op Cognitive Dysfunction Neg Hx      Other Neg Hx                 Past Surgical History:   Procedure Laterality Date    HX CATARACT REMOVAL Bilateral      HX COLONOSCOPY   2008    HX ORTHOPAEDIC Left 2009     lori insertion for fx femur    HX TONSILLECTOMY         as a child    TOTAL HIP ARTHROPLASTY Right 2008    TOTAL KNEE ARTHROPLASTY Left 2000         Review of Systems     A complete review of systems was done and is as stated in the history of present illness or otherwise negative.         Vitals:     08/03/17 0903   BP: 140/75   Temp: 97 °F (36.1 °C)   Weight: 136 lb 6.4 oz (61.9 kg)             Physical Exam:         General:  The patient is well developed, well nourished, provides a coherent history and is in no acute distress. Eyes:  Conjunctivae/corneas clear. PERRL, EOMs intact. Mouth/Throat: Lips, mucosa, and tongue normal. Teeth and gums normal.   Neck: Supple, symmetrical, trachea midline, no adenopathy, thyroid: no enlargment/tenderness/nodules, and no JVD. Back:   Symmetric, nomal curvature. ROM normal. No tenderness in spine or lower back. Lungs:   Clear to auscultation bilaterally. Heart:  Regular rate and rhythm, S1, S2 normal, no murmur   Abdomen:   Soft, non-tender. Bowel sounds normal. No masses,  No organomegaly. Extremities: Extremities normal, atraumatic, no cyanosis or edema. Pulses: Diminished bilaterally but better felt in right dorsalis pedis than the left. Leg warm. Skin: Skin color, texture, turgor normal. No rashes or lesions   Lymph nodes: Cervical, supraclavicular, and axillary nodes normal.         Cranial Nerves:   Cranial nerves intact, EOM's full, no nystagmus, no ptosis. Facial sensation is normal. Facial movement is symmetric. Hearing is normal bilaterally. Palate is midline with normal sternocleidomastoid and trapezius muscles are normal. Tongue is midline. Motor:  5/5 strength in right and left upper extremities. 4/5 in left and 3+/5 right lower proximal and distal muscles. 4-/5 back extensors. Normal bulk and tone. No fasciculations.     Reflexes:   Deep tendon reflexes +1 and symmetrical.    Sensory:   Normal to touch, pinprick and vibration. Gait:  Slow, shuffling gait. Tremor:   No tremor noted. Cerebellar:  No cerebellar signs present.                            Imaging:     lumbar Spine MRI images reviewed by myself and discussed with patient. Degenerative scoliosis with multilevel DDD. At L5-S1 and L4-5 there are right foraminal and lateral stenoses which is most likely responsible for her right side pain.     Assessment & Plan     Patient presents with low back pain with lumbar radiculopathy and neurogenic claudication. Due to diminished pulse will send for vascular studies to evaluate for vascular claudication as a contributor. Will refer to physical therapy due lower extremity weakness and see if this helps her pain. Get dynamic lumbar x-rays before next visit to check for spinal stability. Low dose Neurontin at night to help with neuropathy symptoms - patient warned about sedating side effects and the need to inform her sister in law when she is about to start this new medication. Follow up in 2 months with Dr Pollo Wong.     1. Bilateral lumbar radiculopathy     - REFERRAL TO PHYSICAL THERAPY; Future  - XR SPINE LUMB MIN 4 V; Future  - VASCULAR SURGERY - [facility]; Future     2. Claudication ([address]     - REFERRAL TO PHYSICAL THERAPY; Future  - XR SPINE LUMB MIN 4 V; Future  - VASCULAR SURGERY - [facility]; Future     3. Chronic right-sided low back pain with right-sided sciatica     - REFERRAL TO PHYSICAL THERAPY; Future  - XR SPINE LUMB MIN 4 V; Future  - VASCULAR SURGERY - [facility]; Future     4. Facet arthropathy, lumbosacral     - REFERRAL TO PHYSICAL THERAPY; Future  - XR SPINE LUMB MIN 4 V; Future  - VASCULAR SURGERY - [facility]; Future    The patient is a 80 y.o. female who returns with several years' H/O arthritic LBP. This is worst in the early am and with getting up after sitting for awhile. She also has a component of pain in the R L4 and L5 distributions to the distal leg. She cannot identify a precipitating incident. She has no paresthesias with this and no focal weakness. The pain is purely mechanical in nature. Standing aggravates the radicular component most.  She has been through maximal conservative Rx without lasting relief.             Visit Vitals    /70    Temp 97.1 °F (36.2 °C)    Wt 138 lb (62.6 kg)    BMI 21.61 kg/m2            Physical Exam  The physical exam findings are as follows:  Cranial Nerves:   Intact visual fields. Fundi are benign. GREYSON, EOM's full, no nystagmus, no ptosis. Facial sensation is normal. Corneal reflexes are intact. Facial movement is symmetric. Hearing is normal bilaterally. Palate is midline with normal sternocleidomastoid and trapezius muscles are normal. Tongue is midline. Motor:  5/5 strength in upper and lower proximal and distal muscles. Normal bulk and tone. No fasciculations. Reflexes:   Deep tendon reflexes 2+/4 and symmetrical. No pathologic reflexes present. Sensory:   Normal to touch, pinprick and vibration. Gait:  Mildly antalgic, a bit stiff. Tremor:   No tremor noted. Cerebellar:  No cerebellar signs present. GCS15, A&Ox3.      Assessment & Plan  The MRI L-spine reveals a R L3-4 and L4-5 lateral recess stenosis with moderately severe neural compromise. There is moderate diffuse DDD.     I do suspect the above findings are the etiology of her symptoms. The axial LBP is arthritic in nature, but the radicular RLE component is due to neural compression. She wishes to proceed with surgery for the radicular component, which will involve a R L3-4 and L4-5 decompression. She understands the nature of the procedure and the risks and benefits. We will set this up at the earliest convenience.

## 2017-11-30 NOTE — ANESTHESIA PROCEDURE NOTES
Arterial Line Placement    Start time: 11/30/2017 11:20 AM  End time: 11/30/2017 11:24 AM  Performed by: Maegan Magallon by: Alpha Coupe     Pre-Procedure  Indications:  Arterial pressure monitoring  Preanesthetic Checklist: patient identified, risks and benefits discussed, anesthesia consent, site marked, patient being monitored, timeout performed and patient being monitored    Timeout Time: 11:20        Procedure:   Prep:  ChloraPrep  Seldinger Technique?: No    Orientation:  Right  Location:  Radial artery  Catheter size:  20 G  Number of attempts:  1  Cont Cardiac Output Sensor: No      Assessment:   Post-procedure:  Line secured and sterile dressing applied  Patient Tolerance:  Patient tolerated the procedure well with no immediate complications

## 2017-11-30 NOTE — IP AVS SNAPSHOT
Radha Patel 
 
 
 [address] [facility] [address] 
[phone] Patient: Castro Kim MRN: [id number] :[id number] About your hospitalization You were admitted on:  2017 You last received care in the:  [facility] PACU You were discharged on:  2017 Why you were hospitalized Your primary diagnosis was:  Not on File Things You Need To Do (next 8 weeks) Follow up with Chio Mcmullen MD  
  
Phone:  [phone] Where:  [address] [facility] ([place] [facility]), [address]  [address] Martin Monday Dec 11, 2017 WOUND CHECK with [facility] NURSE at  9:30 AM  
Where:  [facility] ([facility]) Follow up with Gema Ulrich MD  
0930 AM  
  
Phone:  [phone] Where:  [facility], [address], [address], [address] [facility] [address] [address] Discharge Orders None A check gato indicates which time of day the medication should be taken. My Medications TAKE these medications as instructed Instructions Each Dose to Equal  
 Morning Noon Evening Bedtime  
 aspirin delayed-release 81 mg tablet Your last dose was: Your next dose is: Take 81 mg by mouth daily as needed. Indications: prevention of transient ischemic attack 81 mg  
    
   
   
   
  
 cyanocobalamin 1,000 mcg tablet Your last dose was: Your next dose is: Take 1,000 mcg by mouth daily. 1000 mcg  
    
   
   
   
  
 escitalopram oxalate 10 mg tablet Commonly known as:  Lamin Dub Your last dose was: Your next dose is: Take 1 Tab by mouth daily. 10 mg  
    
   
   
   
  
 gabapentin 100 mg capsule Commonly known as:  NEURONTIN Your last dose was: Your next dose is: Take 100 mg by mouth nightly.   
 100 mg  
    
   
   
   
  
 HYDROcodone-acetaminophen 5-325 mg per tablet Commonly known as:  Shara Galvez Your last dose was: Your next dose is:    
   
   
 1-2 tabs po q4-6h prn pain  
     
   
   
   
  
 meloxicam 7.5 mg tablet Commonly known as:  MOBIC Your last dose was: Your next dose is: Take 1-2 Tabs by mouth daily as needed for Pain. 7.5-15 mg  
    
   
   
   
  
 senna-docusate 8.6-50 mg per tablet Commonly known as:  Miriam Christensens Your last dose was: Your next dose is: Take 2 Tabs by mouth daily. 2 Tab Where to Get Your Medications Information on where to get these meds will be given to you by the nurse or doctor. ! Ask your nurse or doctor about these medications HYDROcodone-acetaminophen 5-325 mg per tablet  
 senna-docusate 8.6-50 mg per tablet Discharge Instructions [facility], [address] 
[phone] Postoperative Home Instructions Lumbar (Back) Surgery · Showering: You may shower the first day you are home with the dressing on. If the dressing becomes saturated, change it completely to a similar dressing. Leave the steri-strips in place. After 3 days, you may take the dressing off and leave it off. Do not soak in a tub, and use only soap and water on the wound. · Wound Care: A small to moderate amount of reddish drainage on the dressing is normal the first 1-2 days after surgery. If the dressing becomes saturated, change it. Once the steri-strips begin to peel up on their own, you may gently take them off. Large amounts of clear, watery drainage is not normal and you should call our office immediately. · Signs of Infection: Extreme tenderness at the wound, excessive redness and/or swelling, or ugly yellowish-greenish drainage from the wound. Fever greater than 100.5 may be present. If you think you have a wound infection, call our office immediately. · Driving: You may not begin driving until after your visit to our office for a wound and suture check which is normally 7-10 days after you come home from the hospital. 
 
· Medications: The pain medicine prescribed may be taken as needed. You should take a stool softener (Colace) twice a day, drink lots of water and eat high fiber foods to avoid constipation (this is a common problem with pain medicine.) · Deep Breathing Exercises: Continue to do your incentive spirometry and/or deep breathing exercises to prevent risk of pneumonia. · Smoking: YOU MAY NOT SMOKE! Smoking will interfere with your healing. If you smoke, you may end up with having another surgery or more problems! · Activity: No heavy lifting for 4 weeks after surgery. This means anything heavier than a coffee cup or newspaper. After 4 weeks, you may gradually begin lifting heavier things. Avoid bending, stooping, or twisting at the waist.  Do not lie on your stomach to sleep. · You may remove your Newton hose when consistently walking. You may do steps and inclines in moderation. · Sexual Relations: You may resume sexual relations 2 weeks after your surgery. · Walking Program: You should begin walking every day on the first day after surgery. Start for short distances, then go a little farther each day. You should eventually walk 1-2 miles every day for the long term. This is very important in your recovery period because walking strengthens the spinal muscles and will help protect your disc and vertebrae. · Symptoms after Surgery: Dont be alarmed if you still have some symptoms after surgery. The nerves often require time to heal after the pressure has been taken off. Be patient, you should see improvement with time.  
 
· Follow-up: You will need to call our office for an appointment to see a nurse one week after surgery for a wound check. An appointment will be made then for you to see your surgeon about 4 weeks after surgery. Please call our office if you have any other questions or problems. Listen to your body; it will tell you if you are overdoing it. Use common sense and take care of yourself! After general anesthesia or intravenous sedation, for 24 hours or while taking prescription Narcotics: · Limit your activities · Do not drive and operate hazardous machinery · Do not make important personal or business decisions · Do  not drink alcoholic beverages · If you have not urinated within 8 hours after discharge, please contact your surgeon on call. *  Please give a list of your current medications to your Primary Care Provider. *  Please update this list whenever your medications are discontinued, doses are 
    changed, or new medications (including over-the-counter products) are added. *  Please carry medication information at all times in case of emergency situations. These are general instructions for a healthy lifestyle: No smoking/ No tobacco products/ Avoid exposure to second hand smoke Surgeon General's Warning:  Quitting smoking now greatly reduces serious risk to your health. You may be retaining fluid if you have a history of heart failure or if you experience any of the following symptoms:  Weight gain of 3 pounds or more overnight or 5 pounds in a week, increased swelling in our hands or feet or shortness of breath while lying flat in bed. Please call your doctor as soon as you notice any of these symptoms; do not wait until your next office visit. Recognize signs and symptoms of STROKE: 
 
F-face looks uneven A-arms unable to move or move unevenly S-speech slurred or non-existent T-time-call 911 as soon as signs and symptoms begin-DO NOT go Back to bed or wait to see if you get better-TIME IS BRAIN. ACO Transitions of Care Introducing [facility] [address] offers a voluntary care coordination program to provide high quality service and care to [facility] fee-for-service beneficiaries. Rena Joseph was designed to help you enhance your health and well-being through the following services: ? Transitions of Care  support for individuals who are transitioning from one care setting to another (example: Hospital to home). ? Chronic and Complex Care Coordination  support for individuals and caregivers of those with serious or chronic illnesses or with more than one chronic (ongoing) condition and those who take a number of different medications. If you meet specific medical criteria, a [address] [place] [address] Rd may call you directly to coordinate your care with your primary care physician and your other care providers. For questions about the [facility] programs, please, contact your physicians office. For general questions or additional information about Accountable Care Organizations: 
Please visit www.medicare.gov/acos. html or call 1-800-MEDICARE (0-325.131.3141) TTY users should call 9-923.853.7864. Introducing [facility] [place] [facility]! Bernice Hebert introduces [employer] patient portal. Now you can access parts of your medical record, email your doctor's office, and request medication refills online. 1. In your internet browser, go to [email or web address] 2. Click on the First Time User? Click Here link in the Sign In box. You will see the New Member Sign Up page. 3. Enter your [employer] Access Code exactly as it appears below. You will not need to use this code after youve completed the sign-up process. If you do not sign up before the expiration date, you must request a new code. · [employer] Access Code: [email or web address] Expires: 2/6/2018  4:32 PM 
 
 4. Enter the last four digits of your Social Security Number (xxxx) and Date of Birth (mm/dd/yyyy) as indicated and click Submit. You will be taken to the next sign-up page. 5. Create a Ticketbud ID. This will be your Ticket[employer] login ID and cannot be changed, so think of one that is secure and easy to remember. 6. Create a Ticketbud password. You can change your password at any time. 7. Enter your Password Reset Question and Answer. This can be used at a later time if you forget your password. 8. Enter your e-mail address. You will receive e-mail notification when new information is available in [address] 9. Click Sign Up. You can now view and download portions of your medical record. 10. Click the Download Summary menu link to download a portable copy of your medical information. If you have questions, please visit the Frequently Asked Questions section of the [employer] website. Remember, [employer] is NOT to be used for urgent needs. For medical emergencies, dial 911. Now available from your iPhone and Android! Providers Seen During Your Hospitalization Provider Specialty Primary office phone Marry Harris MD Neurosurgery [phone] Your Primary Care Physician (PCP) Primary Care Physician Office Phone Office Fax Whitney Whitman [phone] You are allergic to the following Allergen Reactions Latex, Natural Rubber Rash Patient stated she has latex allergy 8/4/09 Recent Documentation Height Weight BMI OB Status Smoking Status 1.702 m 62.1 kg 21.43 kg/m2 Postmenopausal Never Smoker Emergency Contacts Name Discharge Info Relation Home Work Mobile [address] CAREGIVER [3] Other Relative [6] [phone] Patient Belongings  The following personal items are in your possession at time of discharge: 
  Dental Appliances: None  Visual Aid: Glasses, With patient      Home Medications: None   Jewelry: Ring (yellow metal colored ring with clear stones; unable to remove ring due to arthritis)  Clothing: Pants, Shirt, Footwear, Undergarments    Other Valuables: Eyeglasses Please provide this summary of care documentation to your next provider. Signatures-by signing, you are acknowledging that this After Visit Summary has been reviewed with you and you have received a copy. Patient Signature:  ____________________________________________________________ Date:  ____________________________________________________________  
  
[facility] Provider Signature:  ____________________________________________________________ Date:  ____________________________________________________________

## 2017-11-30 NOTE — ANESTHESIA POSTPROCEDURE EVALUATION
Post-Anesthesia Evaluation and Assessment    Patient: Berny Strong MRN: [id number]  SSN: [id number]    YOB: 1922  Age: 80 y.o. Sex: female       Cardiovascular Function/Vital Signs  Visit Vitals    /65    Pulse (!) 58    Temp 36.2 °C (97.2 °F)    Resp 12    Ht 5' 7\" (1.702 m)    Wt 62.1 kg (136 lb 12.8 oz)    SpO2 98%    BMI 21.43 kg/m2       Patient is status post general anesthesia for Procedure(s):  RIGHT L3-4, 4-5 DECOMPRESSION. Nausea/Vomiting: None    Postoperative hydration reviewed and adequate. Pain:  Pain Scale 1: Visual (11/30/17 1317)  Pain Intensity 1: 0 (11/30/17 1317)   Managed    Neurological Status:   Neuro (WDL): Within Defined Limits (11/30/17 1317)   At baseline    Mental Status and Level of Consciousness: Arousable    Pulmonary Status:   O2 Device: Nasal cannula (11/30/17 1317)   Adequate oxygenation and airway patent    Complications related to anesthesia: None    Post-anesthesia assessment completed.  No concerns    Signed By: Juli Schumacher MD     November 30, 2017

## 2017-11-30 NOTE — DISCHARGE INSTRUCTIONS
[facility]  [address], [place], [address] [facility]  [phone]    Postoperative Home Instructions  Lumbar (Back) Surgery    · Showering: You may shower the first day you are home with the dressing on. If the dressing becomes saturated, change it completely to a similar dressing. Leave the steri-strips in place. After 3 days, you may take the dressing off and leave it off. Do not soak in a tub, and use only soap and water on the wound. · Wound Care: A small to moderate amount of reddish drainage on the dressing is normal the first 1-2 days after surgery. If the dressing becomes saturated, change it. Once the steri-strips begin to peel up on their own, you may gently take them off. Large amounts of clear, watery drainage is not normal and you should call our office immediately. · Signs of Infection: Extreme tenderness at the wound, excessive redness and/or swelling, or ugly yellowish-greenish drainage from the wound. Fever greater than 100.5 may be present. If you think you have a wound infection, call our office immediately. · Driving: You may not begin driving until after your visit to our office for a wound and suture check which is normally 7-10 days after you come home from the hospital.    · Medications: The pain medicine prescribed may be taken as needed. You should take a stool softener (Colace) twice a day, drink lots of water and eat high fiber foods to avoid constipation (this is a common problem with pain medicine.)    · Deep Breathing Exercises: Continue to do your incentive spirometry and/or deep breathing exercises to prevent risk of pneumonia. · Smoking: YOU MAY NOT SMOKE! Smoking will interfere with your healing. If you smoke, you may end up with having another surgery or more problems! · Activity: No heavy lifting for 4 weeks after surgery. This means anything heavier than a coffee cup or newspaper.   After 4 weeks, you may gradually begin lifting heavier things. Avoid bending, stooping, or twisting at the waist.  Do not lie on your stomach to sleep. · You may remove your Newton hose when consistently walking. You may do steps and inclines in moderation. · Sexual Relations: You may resume sexual relations 2 weeks after your surgery. · Walking Program: You should begin walking every day on the first day after surgery. Start for short distances, then go a little farther each day. You should eventually walk 1-2 miles every day for the long term. This is very important in your recovery period because walking strengthens the spinal muscles and will help protect your disc and vertebrae. · Symptoms after Surgery: Dont be alarmed if you still have some symptoms after surgery. The nerves often require time to heal after the pressure has been taken off. Be patient, you should see improvement with time. · Follow-up: You will need to call our office for an appointment to see a nurse one week after surgery for a wound check. An appointment will be made then for you to see your surgeon about 4 weeks after surgery. Please call our office if you have any other questions or problems. Listen to your body; it will tell you if you are overdoing it. Use common sense and take care of yourself! After general anesthesia or intravenous sedation, for 24 hours or while taking prescription Narcotics:  · Limit your activities  · Do not drive and operate hazardous machinery  · Do not make important personal or business decisions  · Do  not drink alcoholic beverages  · If you have not urinated within 8 hours after discharge, please contact your surgeon on call. *  Please give a list of your current medications to your Primary Care Provider. *  Please update this list whenever your medications are discontinued, doses are      changed, or new medications (including over-the-counter products) are added.   *  Please carry medication information at all times in case of emergency situations. These are general instructions for a healthy lifestyle:  No smoking/ No tobacco products/ Avoid exposure to second hand smoke  Surgeon General's Warning:  Quitting smoking now greatly reduces serious risk to your health. You may be retaining fluid if you have a history of heart failure or if you experience any of the following symptoms:  Weight gain of 3 pounds or more overnight or 5 pounds in a week, increased swelling in our hands or feet or shortness of breath while lying flat in bed. Please call your doctor as soon as you notice any of these symptoms; do not wait until your next office visit. Recognize signs and symptoms of STROKE:    F-face looks uneven  A-arms unable to move or move unevenly  S-speech slurred or non-existent  T-time-call 911 as soon as signs and symptoms begin-DO NOT go       Back to bed or wait to see if you get better-TIME IS BRAIN.

## 2017-11-30 NOTE — ANESTHESIA PREPROCEDURE EVALUATION
Anesthetic History               Review of Systems / Medical History  Patient summary reviewed, nursing notes reviewed and pertinent labs reviewed    Pulmonary                   Neuro/Psych   Within defined limits           Cardiovascular              PAD (Total occlusion of R ICA. Told by [address] vascular surgeon and primary MD that she has good colateral  blood flow. Completely asymprtomatic.)    Exercise tolerance: >4 METS     GI/Hepatic/Renal           PUD (Remote hx)     Endo/Other        Arthritis     Other Findings              Physical Exam    Airway  Mallampati: I  TM Distance: 4 - 6 cm  Neck ROM: normal range of motion   Mouth opening: Normal     Cardiovascular    Rhythm: regular  Rate: normal        Comments: Barely audible bruit over L ICA Dental  No notable dental hx       Pulmonary  Breath sounds clear to auscultation               Abdominal  GI exam deferred       Other Findings            Anesthetic Plan    ASA: 2  Anesthesia type: general            Anesthetic plan and risks discussed with: Patient    Friend present. Pt has no family.

## 2018-03-02 ENCOUNTER — HOSPITAL ENCOUNTER (EMERGENCY)
Age: 83
Discharge: HOME | End: 2018-03-02
Attending: EMERGENCY MEDICINE
Payer: MEDICARE

## 2018-03-02 ENCOUNTER — APPOINTMENT (OUTPATIENT)
Dept: GENERAL RADIOLOGY | Age: 83
End: 2018-03-02
Attending: EMERGENCY MEDICINE
Payer: MEDICARE

## 2018-03-02 VITALS
SYSTOLIC BLOOD PRESSURE: 198 MMHG | DIASTOLIC BLOOD PRESSURE: 88 MMHG | OXYGEN SATURATION: 99 % | TEMPERATURE: 98.6 F | RESPIRATION RATE: 16 BRPM | HEART RATE: 64 BPM

## 2018-03-02 DIAGNOSIS — M25.512 PAIN IN JOINT OF LEFT SHOULDER: Primary | ICD-10-CM

## 2018-03-02 LAB
ALBUMIN SERPL-MCNC: 3.3 G/DL (ref 3.2–4.6)
ALBUMIN/GLOB SERPL: 0.8 {RATIO} (ref 1.2–3.5)
ALP SERPL-CCNC: 96 U/L (ref 50–136)
ALT SERPL-CCNC: 22 U/L (ref 12–65)
ANION GAP SERPL CALC-SCNC: 8 MMOL/L (ref 7–16)
AST SERPL-CCNC: 29 U/L (ref 15–37)
BASOPHILS # BLD: 0.1 K/UL (ref 0–0.2)
BASOPHILS NFR BLD: 1 % (ref 0–2)
BILIRUB SERPL-MCNC: 0.6 MG/DL (ref 0.2–1.1)
BUN SERPL-MCNC: 25 MG/DL (ref 8–23)
CALCIUM SERPL-MCNC: 9.4 MG/DL (ref 8.3–10.4)
CHLORIDE SERPL-SCNC: 107 MMOL/L (ref 98–107)
CO2 SERPL-SCNC: 27 MMOL/L (ref 21–32)
CREAT SERPL-MCNC: 0.84 MG/DL (ref 0.6–1)
DIFFERENTIAL METHOD BLD: ABNORMAL
EOSINOPHIL # BLD: 0.3 K/UL (ref 0–0.8)
EOSINOPHIL NFR BLD: 5 % (ref 0.5–7.8)
ERYTHROCYTE [DISTWIDTH] IN BLOOD BY AUTOMATED COUNT: 20.5 % (ref 11.9–14.6)
GLOBULIN SER CALC-MCNC: 3.9 G/DL (ref 2.3–3.5)
GLUCOSE SERPL-MCNC: 93 MG/DL (ref 65–100)
HCT VFR BLD AUTO: 30.8 % (ref 35.8–46.3)
HGB BLD-MCNC: 9.1 G/DL (ref 11.7–15.4)
IMM GRANULOCYTES # BLD: 0 K/UL (ref 0–0.5)
IMM GRANULOCYTES NFR BLD AUTO: 0 % (ref 0–5)
LYMPHOCYTES # BLD: 1.3 K/UL (ref 0.5–4.6)
LYMPHOCYTES NFR BLD: 24 % (ref 13–44)
MCH RBC QN AUTO: 23.5 PG (ref 26.1–32.9)
MCHC RBC AUTO-ENTMCNC: 29.5 G/DL (ref 31.4–35)
MCV RBC AUTO: 79.6 FL (ref 79.6–97.8)
MONOCYTES # BLD: 0.5 K/UL (ref 0.1–1.3)
MONOCYTES NFR BLD: 10 % (ref 4–12)
NEUTS SEG # BLD: 3.2 K/UL (ref 1.7–8.2)
NEUTS SEG NFR BLD: 60 % (ref 43–78)
PLATELET # BLD AUTO: 216 K/UL (ref 150–450)
PMV BLD AUTO: 10.4 FL (ref 10.8–14.1)
POTASSIUM SERPL-SCNC: 4.1 MMOL/L (ref 3.5–5.1)
PROT SERPL-MCNC: 7.2 G/DL (ref 6.3–8.2)
RBC # BLD AUTO: 3.87 M/UL (ref 4.05–5.25)
SODIUM SERPL-SCNC: 142 MMOL/L (ref 136–145)
TROPONIN I BLD-MCNC: 0 NG/ML (ref 0.02–0.05)
TROPONIN I BLD-MCNC: 0.01 NG/ML (ref 0.02–0.05)
WBC # BLD AUTO: 5.3 K/UL (ref 4.3–11.1)

## 2018-03-02 PROCEDURE — 85025 COMPLETE CBC W/AUTO DIFF WBC: CPT | Performed by: EMERGENCY MEDICINE

## 2018-03-02 PROCEDURE — 93005 ELECTROCARDIOGRAM TRACING: CPT | Performed by: EMERGENCY MEDICINE

## 2018-03-02 PROCEDURE — 84484 ASSAY OF TROPONIN QUANT: CPT

## 2018-03-02 PROCEDURE — 80053 COMPREHEN METABOLIC PANEL: CPT | Performed by: EMERGENCY MEDICINE

## 2018-03-02 PROCEDURE — 74011250637 HC RX REV CODE- 250/637: Performed by: EMERGENCY MEDICINE

## 2018-03-02 PROCEDURE — 99285 EMERGENCY DEPT VISIT HI MDM: CPT | Performed by: EMERGENCY MEDICINE

## 2018-03-02 PROCEDURE — 71045 X-RAY EXAM CHEST 1 VIEW: CPT

## 2018-03-02 RX ORDER — HYDROCODONE BITARTRATE AND ACETAMINOPHEN 5; 325 MG/1; MG/1
1 TABLET ORAL
Status: COMPLETED | OUTPATIENT
Start: 2018-03-02 | End: 2018-03-02

## 2018-03-02 RX ORDER — LORAZEPAM 0.5 MG/1
0.5 TABLET ORAL
Status: COMPLETED | OUTPATIENT
Start: 2018-03-02 | End: 2018-03-02

## 2018-03-02 RX ADMIN — HYDROCODONE BITARTRATE AND ACETAMINOPHEN 1 TABLET: 5; 325 TABLET ORAL at 15:56

## 2018-03-02 RX ADMIN — LORAZEPAM 0.5 MG: 0.5 TABLET ORAL at 15:56

## 2018-03-02 NOTE — DISCHARGE INSTRUCTIONS
Joint Pain: Care Instructions  Your Care Instructions    Many people have small aches and pains from overuse or injury to muscles and joints. Joint injuries often happen during sports or recreation, work tasks, or projects around the home. An overuse injury can happen when you put too much stress on a joint or when you do an activity that stresses the joint over and over, such as using the computer or rowing a boat. You can take action at home to help your muscles and joints get better. You should feel better in 1 to 2 weeks, but it can take 3 months or more to heal completely. Follow-up care is a key part of your treatment and safety. Be sure to make and go to all appointments, and call your doctor if you are having problems. It's also a good idea to know your test results and keep a list of the medicines you take. How can you care for yourself at home? · Do not put weight on the injured joint for at least a day or two. · For the first day or two after an injury, do not take hot showers or baths, and do not use hot packs. The heat could make swelling worse. · Put ice or a cold pack on the sore joint for 10 to 20 minutes at a time. Try to do this every 1 to 2 hours for the next 3 days (when you are awake) or until the swelling goes down. Put a thin cloth between the ice and your skin. · Wrap the injury in an elastic bandage. Do not wrap it too tightly because this can cause more swelling. · Prop up the sore joint on a pillow when you ice it or anytime you sit or lie down during the next 3 days. Try to keep it above the level of your heart. This will help reduce swelling. · Take an over-the-counter pain medicine, such as acetaminophen (Tylenol), ibuprofen (Advil, Motrin), or naproxen (Aleve). Read and follow all instructions on the label. · After 1 or 2 days of rest, begin moving the joint gently.  While the joint is still healing, you can begin to exercise using activities that do not strain or hurt the painful joint. When should you call for help? Call your doctor now or seek immediate medical care if:  ? · You have signs of infection, such as:  ¨ Increased pain, swelling, warmth, and redness. ¨ Red streaks leading from the joint. ¨ A fever. ? Watch closely for changes in your health, and be sure to contact your doctor if:  ? · Your movement or symptoms are not getting better after 1 to 2 weeks of home treatment. Where can you learn more? Go to http://vannesa-chrystal.info/. Enter P205 in the search box to learn more about \"Joint Pain: Care Instructions. \"  Current as of: March 21, 2017  Content Version: 11.4  © 3670-9591 [employer]. Care instructions adapted under license by [employer] (which disclaims liability or warranty for this information). If you have questions about a medical condition or this instruction, always ask your healthcare professional. [place] [address] any warranty or liability for your use of this information. Shoulder Pain: Care Instructions  Your Care Instructions    You can hurt your shoulder by using it too much during an activity, such as fishing or baseball. It can also happen as part of the everyday wear and tear of getting older. Shoulder injuries can be slow to heal, but your shoulder should get better with time. Your doctor may recommend a sling to rest your shoulder. If you have injured your shoulder, you may need testing and treatment. Follow-up care is a key part of your treatment and safety. Be sure to make and go to all appointments, and call your doctor if you are having problems. It's also a good idea to know your test results and keep a list of the medicines you take. How can you care for yourself at home? · Take pain medicines exactly as directed. ¨ If the doctor gave you a prescription medicine for pain, take it as prescribed.   ¨ If you are not taking a prescription pain medicine, ask your doctor if you can take an over-the-counter medicine. ¨ Do not take two or more pain medicines at the same time unless the doctor told you to. Many pain medicines contain acetaminophen, which is Tylenol. Too much acetaminophen (Tylenol) can be harmful. · If your doctor recommends that you wear a sling, use it as directed. Do not take it off before your doctor tells you to. · Put ice or a cold pack on the sore area for 10 to 20 minutes at a time. Put a thin cloth between the ice and your skin. · If there is no swelling, you can put moist heat, a heating pad, or a warm cloth on your shoulder. Some doctors suggest alternating between hot and cold. · Rest your shoulder for a few days. If your doctor recommends it, you can then begin gentle exercise of the shoulder, but do not lift anything heavy. When should you call for help? Call 911 anytime you think you may need emergency care. For example, call if:  ? · You have chest pain or pressure. This may occur with:  ¨ Sweating. ¨ Shortness of breath. ¨ Nausea or vomiting. ¨ Pain that spreads from the chest to the neck, jaw, or one or both shoulders or arms. ¨ Dizziness or lightheadedness. ¨ A fast or uneven pulse. After calling 911, chew 1 adult-strength aspirin. Wait for an ambulance. Do not try to drive yourself. ? · Your arm or hand is cool or pale or changes color. ?Call your doctor now or seek immediate medical care if:  ? · You have signs of infection, such as:  ¨ Increased pain, swelling, warmth, or redness in your shoulder. ¨ Red streaks leading from a place on your shoulder. ¨ Pus draining from an area of your shoulder. ¨ Swollen lymph nodes in your neck, armpits, or groin. ¨ A fever. ? Watch closely for changes in your health, and be sure to contact your doctor if:  ? · You cannot use your shoulder. ? · Your shoulder does not get better as expected. Where can you learn more? Go to http://vannesa-chrystal.info/.   Enter I042 in the search box to learn more about \"Shoulder Pain: Care Instructions. \"  Current as of: March 21, 2017  Content Version: 11.4  © 9052-8225 [employer], [employer]. Care instructions adapted under license by [employer] (which disclaims liability or warranty for this information). If you have questions about a medical condition or this instruction, always ask your healthcare professional. [place] [address] any warranty or liability for your use of this information.

## 2018-03-02 NOTE — ED NOTES
I have reviewed discharge instructions with the patient. The patient verbalized understanding. Patient left ED via Discharge Method: ambulatory to Home with self. Opportunity for questions and clarification provided. Patient given 0 scripts. To continue your aftercare when you leave the hospital, you may receive an automated call from our care team to check in on how you are doing. This is a free service and part of our promise to provide the best care and service to meet your aftercare needs.  If you have questions, or wish to unsubscribe from this service please call [phone]. Thank you for Choosing our [address] [place] [address] Emergency Department.

## 2018-03-05 LAB
CALCULATED P AXIS, ECG09: 78 DEGREES
CALCULATED R AXIS, ECG10: 72 DEGREES
CALCULATED T AXIS, ECG11: 39 DEGREES
DIAGNOSIS, 93000: NORMAL
P-R INTERVAL, ECG05: 214 MS
Q-T INTERVAL, ECG07: 418 MS
QRS DURATION, ECG06: 78 MS
QTC CALCULATION (BEZET), ECG08: 428 MS
VENTRICULAR RATE, ECG03: 63 BPM

## 2020-06-28 ENCOUNTER — HOSPITAL ENCOUNTER (EMERGENCY)
Age: 85
Discharge: HOME | End: 2020-06-29
Attending: EMERGENCY MEDICINE
Payer: MEDICARE

## 2020-06-28 DIAGNOSIS — I10 HYPERTENSION, UNSPECIFIED TYPE: Primary | ICD-10-CM

## 2020-06-28 DIAGNOSIS — R07.9 CHEST PAIN, UNSPECIFIED TYPE: ICD-10-CM

## 2020-06-28 PROCEDURE — 84484 ASSAY OF TROPONIN QUANT: CPT

## 2020-06-28 PROCEDURE — 80048 BASIC METABOLIC PNL TOTAL CA: CPT

## 2020-06-28 PROCEDURE — 85025 COMPLETE CBC W/AUTO DIFF WBC: CPT

## 2020-06-28 PROCEDURE — 96374 THER/PROPH/DIAG INJ IV PUSH: CPT

## 2020-06-28 PROCEDURE — 99284 EMERGENCY DEPT VISIT MOD MDM: CPT

## 2020-06-28 RX ORDER — GUAIFENESIN 100 MG/5ML
324 LIQUID (ML) ORAL
Status: COMPLETED | OUTPATIENT
Start: 2020-06-28 | End: 2020-06-29

## 2020-06-28 RX ORDER — HYDRALAZINE HYDROCHLORIDE 20 MG/ML
10 INJECTION INTRAMUSCULAR; INTRAVENOUS
Status: COMPLETED | OUTPATIENT
Start: 2020-06-28 | End: 2020-06-29

## 2020-06-29 ENCOUNTER — APPOINTMENT (OUTPATIENT)
Dept: GENERAL RADIOLOGY | Age: 85
End: 2020-06-29
Attending: EMERGENCY MEDICINE
Payer: MEDICARE

## 2020-06-29 VITALS
HEART RATE: 80 BPM | TEMPERATURE: 97.7 F | DIASTOLIC BLOOD PRESSURE: 81 MMHG | SYSTOLIC BLOOD PRESSURE: 171 MMHG | HEIGHT: 68 IN | BODY MASS INDEX: 21.22 KG/M2 | WEIGHT: 140 LBS | OXYGEN SATURATION: 99 % | RESPIRATION RATE: 18 BRPM

## 2020-06-29 LAB
ANION GAP SERPL CALC-SCNC: 5 MMOL/L (ref 7–16)
ATRIAL RATE: 65 BPM
BASOPHILS # BLD: 0.1 K/UL (ref 0–0.2)
BASOPHILS NFR BLD: 1 % (ref 0–2)
BUN SERPL-MCNC: 22 MG/DL (ref 8–23)
CALCIUM SERPL-MCNC: 9 MG/DL (ref 8.3–10.4)
CALCULATED P AXIS, ECG09: 68 DEGREES
CALCULATED R AXIS, ECG10: 37 DEGREES
CALCULATED T AXIS, ECG11: -22 DEGREES
CHLORIDE SERPL-SCNC: 107 MMOL/L (ref 98–107)
CO2 SERPL-SCNC: 28 MMOL/L (ref 21–32)
CREAT SERPL-MCNC: 0.82 MG/DL (ref 0.6–1)
DIAGNOSIS, 93000: NORMAL
DIFFERENTIAL METHOD BLD: ABNORMAL
EOSINOPHIL # BLD: 0.3 K/UL (ref 0–0.8)
EOSINOPHIL NFR BLD: 7 % (ref 0.5–7.8)
ERYTHROCYTE [DISTWIDTH] IN BLOOD BY AUTOMATED COUNT: 20.2 % (ref 11.9–14.6)
GLUCOSE SERPL-MCNC: 88 MG/DL (ref 65–100)
HCT VFR BLD AUTO: 33.5 % (ref 35.8–46.3)
HGB BLD-MCNC: 10.5 G/DL (ref 11.7–15.4)
IMM GRANULOCYTES # BLD AUTO: 0 K/UL (ref 0–0.5)
IMM GRANULOCYTES NFR BLD AUTO: 0 % (ref 0–5)
LACTATE SERPL-SCNC: 0.9 MMOL/L (ref 0.4–2)
LYMPHOCYTES # BLD: 1.2 K/UL (ref 0.5–4.6)
LYMPHOCYTES NFR BLD: 24 % (ref 13–44)
MCH RBC QN AUTO: 27 PG (ref 26.1–32.9)
MCHC RBC AUTO-ENTMCNC: 31.3 G/DL (ref 31.4–35)
MCV RBC AUTO: 86.1 FL (ref 79.6–97.8)
MONOCYTES # BLD: 0.5 K/UL (ref 0.1–1.3)
MONOCYTES NFR BLD: 10 % (ref 4–12)
NEUTS SEG # BLD: 2.8 K/UL (ref 1.7–8.2)
NEUTS SEG NFR BLD: 57 % (ref 43–78)
NRBC # BLD: 0 K/UL (ref 0–0.2)
P-R INTERVAL, ECG05: 208 MS
PLATELET # BLD AUTO: 173 K/UL (ref 150–450)
PMV BLD AUTO: 11 FL (ref 9.4–12.3)
POTASSIUM SERPL-SCNC: 3.5 MMOL/L (ref 3.5–5.1)
Q-T INTERVAL, ECG07: 410 MS
QRS DURATION, ECG06: 90 MS
QTC CALCULATION (BEZET), ECG08: 426 MS
RBC # BLD AUTO: 3.89 M/UL (ref 4.05–5.2)
SODIUM SERPL-SCNC: 140 MMOL/L (ref 136–145)
TROPONIN-HIGH SENSITIVITY: 10 PG/ML (ref 0–14)
TROPONIN-HIGH SENSITIVITY: 8.1 PG/ML (ref 0–14)
VENTRICULAR RATE, ECG03: 65 BPM
WBC # BLD AUTO: 4.8 K/UL (ref 4.3–11.1)

## 2020-06-29 PROCEDURE — 74011250636 HC RX REV CODE- 250/636: Performed by: EMERGENCY MEDICINE

## 2020-06-29 PROCEDURE — 74011250637 HC RX REV CODE- 250/637: Performed by: EMERGENCY MEDICINE

## 2020-06-29 PROCEDURE — 83605 ASSAY OF LACTIC ACID: CPT

## 2020-06-29 PROCEDURE — 93005 ELECTROCARDIOGRAM TRACING: CPT | Performed by: EMERGENCY MEDICINE

## 2020-06-29 PROCEDURE — 96374 THER/PROPH/DIAG INJ IV PUSH: CPT

## 2020-06-29 PROCEDURE — 71046 X-RAY EXAM CHEST 2 VIEWS: CPT

## 2020-06-29 RX ADMIN — HYDRALAZINE HYDROCHLORIDE 10 MG: 20 INJECTION INTRAMUSCULAR; INTRAVENOUS at 00:22

## 2020-06-29 RX ADMIN — ASPIRIN 81 MG 324 MG: 81 TABLET ORAL at 00:21

## 2020-06-29 NOTE — DISCHARGE INSTRUCTIONS
You need to follow-up with your family doctor this week for repeat evaluation of your blood pressure to make sure that you do not need to be started on a daily blood pressure medication. If you begin developing any new or concerning symptoms please return to the ER at that time.

## 2020-06-29 NOTE — ED PROVIDER NOTES
Patient is a 44-year-old female presenting to the emergency department today complaining of left mid axillary chest wall pain. Patient states it started this evening and lasted for about an hour. She said that movement tended to make the pain worse. She notified 1 of her sisters and then some of her Mandaen friends got involved and suggested she come to the emergency department. The patient says that she does not take any blood pressure medication however her blood pressure was markedly elevated on arrival to the emergency department. Patient says this is happened to her one other time about 5 years ago. She denies any productive cough fevers chills nausea vomiting or changes in her bowel habits.            Past Medical History:   Diagnosis Date    Anxiety     Arthritis     OA- generalized- severe    Carotid artery stenosis     Right/ 100% block per pt    Depression     controlle with daily meds    [place] (hard of hearing)     Hypoglycemia     Leg fracture, left 2009    PUD (peptic ulcer disease)     Venous insufficiency     Venous stasis of lower extremity     left- as reported by pt    Venous ulcer of leg (HCC)     s/p autologous skin graft 10/07       Past Surgical History:   Procedure Laterality Date    HX CATARACT REMOVAL Bilateral     HX COLONOSCOPY  2008    HX ORTHOPAEDIC Left 2009    lori insertion for fx femur    HX TONSILLECTOMY      as a child    TOTAL HIP ARTHROPLASTY Right 2008    TOTAL KNEE ARTHROPLASTY Left 2000         Family History:   Problem Relation Age of Onset    Colon Cancer Father     Cancer Brother         bladder    Cancer Brother 80        bone cancer    Heart Disease Brother     Stroke Brother     Arthritis-osteo Other     Cancer Other     Heart Disease Other     Malignant Hyperthermia Neg Hx     Pseudocholinesterase Deficiency Neg Hx     Delayed Awakening Neg Hx     Post-op Nausea/Vomiting Neg Hx     Emergence Delirium Neg Hx     Post-op Cognitive Dysfunction Neg Hx     Other Neg Hx        Social History     Socioeconomic History    Marital status: SINGLE     Spouse name: Not on file    Number of children: Not on file    Years of education: Not on file    Highest education level: Not on file   Occupational History    Not on file   Social Needs    Financial resource strain: Not on file    Food insecurity     Worry: Not on file     Inability: Not on file    Transportation needs     Medical: Not on file     Non-medical: Not on file   Tobacco Use    Smoking status: Never Smoker    Smokeless tobacco: Never Used   Substance and Sexual Activity    Alcohol use: No    Drug use: No    Sexual activity: Not Currently   Lifestyle    Physical activity     Days per week: Not on file     Minutes per session: Not on file    Stress: Not on file   Relationships    Social connections     Talks on phone: Not on file     Gets together: Not on file     Attends Baptism service: Not on file     Active member of club or organization: Not on file     Attends meetings of clubs or organizations: Not on file     Relationship status: Not on file    Intimate partner violence     Fear of current or ex partner: Not on file     Emotionally abused: Not on file     Physically abused: Not on file     Forced sexual activity: Not on file   Other Topics Concern    Not on file   Social History Narrative    Not on file         ALLERGIES: Latex, natural rubber    Review of Systems   Cardiovascular: Positive for chest pain. All other systems reviewed and are negative. Vitals:    06/28/20 2333 06/29/20 0021 06/29/20 0022   BP: (!) 236/102  (!) 233/98   Pulse: 73 72 67   Resp: 18  (!) 49   Temp: 97.7 °F (36.5 °C)     SpO2: 96%  97%   Weight: 63.5 kg (140 lb)     Height: 5' 7.5\" (1.715 m)              Physical Exam     GENERAL:The patient is well nourished, and well-hydrated. Hard of hearing. No acute distress.   VITAL SIGNS: Heart rate, blood pressure, respiratory rate reviewed as recorded in  nurse's notes  EYES: Pupils reactive. Extraocular motion intact. No conjunctival redness or drainage. NECK: Supple, no meningeal signs. Trachea midline. No masses or thyromegaly. LUNGS: Breath sounds clear and equal bilaterally no accessory muscle use  CHEST: No deformity or crepitus. Reproducible tenderness palpation along the left mid axillary line at the ninth and 10th ribs. CARDIOVASCULAR: Regular rate and rhythm  ABDOMEN: Soft without tenderness. No palpable masses or organomegaly. No  peritoneal signs. No rigidity. EXTREMITIES: No clubbing or cyanosis. No joint swelling. Normal muscle tone. No  restricted range of motion appreciated. NEUROLOGIC: Sensation is grossly intact. Cranial nerve exam reveals face is  symmetrical, tongue is midline speech is clear. SKIN: No rash or petechiae. Good skin turgor palpated. PSYCHIATRIC: Alert and oriented. Appropriate behavior and judgment. MDM  Number of Diagnoses or Management Options  Diagnosis management comments: CHF, COPD, pneumonia, PE,    MI, coronary artery disease, unstable angina, coronary artery disease,    Atrial fibrillation, cardiac arrhythmia, PVC, medication induced palpitations, heart block,  electrolyte induced palpitations,    Aortic dissection, aortic aneurysm,    GERD, musculoskeletal pain, costochondritis, rib fracture, pleurisy,         Amount and/or Complexity of Data Reviewed  Clinical lab tests: reviewed and ordered  Tests in the radiology section of CPT®: reviewed and ordered  Tests in the medicine section of CPT®: ordered and reviewed  Decide to obtain previous medical records or to obtain history from someone other than the patient: yes  Independent visualization of images, tracings, or specimens: yes      ED Course as of Jun 29 0132   Mon Jun 29, 2020   0131 I talked to the patient about the findings in the emergency department on her x-ray and blood work.   The patient is feeling better and her blood pressure has improved significantly after 1 dose of hydralazine here in the emergency department. She does not want any blood pressure medicine to go home with however I talked to her about the need to follow-up with her family doctor and recheck her blood pressure this week for evaluation of need for daily antihypertensive medication.     [KH]      ED Course User Index  [KH] Lashonda Page,        Procedures

## 2020-06-29 NOTE — ED NOTES
I have reviewed discharge instructions with the patient. The patient verbalized understanding. Patient left ED via Discharge Method: ambulatory to Home with friend/neighbor. Opportunity for questions and clarification provided. Patient given 0 scripts. To continue your aftercare when you leave the hospital, you may receive an automated call from our care team to check in on how you are doing. This is a free service and part of our promise to provide the best care and service to meet your aftercare needs.  If you have questions, or wish to unsubscribe from this service please call [phone]. Thank you for Choosing our [facility] Emergency Department.

## 2021-09-17 PROBLEM — F33.0 MAJOR DEPRESSIVE DISORDER, RECURRENT, MILD (HCC): Status: ACTIVE | Noted: 2021-09-17

## 2021-09-17 PROBLEM — F33.9 MAJOR DEPRESSIVE DISORDER, RECURRENT, UNSPECIFIED (HCC): Status: ACTIVE | Noted: 2021-09-17

## 2021-09-17 PROBLEM — F33.1 MAJOR DEPRESSIVE DISORDER, RECURRENT, MODERATE (HCC): Status: ACTIVE | Noted: 2021-09-17

## 2022-01-15 ENCOUNTER — HOME HEALTH ADMISSION (OUTPATIENT)
Dept: HOME HEALTH SERVICES | Facility: HOME HEALTH | Age: 87
End: 2022-01-15

## 2022-03-18 PROBLEM — F33.9 MAJOR DEPRESSIVE DISORDER, RECURRENT, UNSPECIFIED (HCC): Status: ACTIVE | Noted: 2021-09-17

## 2022-03-19 PROBLEM — F33.1 MAJOR DEPRESSIVE DISORDER, RECURRENT, MODERATE (HCC): Status: ACTIVE | Noted: 2021-09-17

## 2022-03-19 PROBLEM — I73.9 CLAUDICATION (HCC): Status: ACTIVE | Noted: 2017-08-03

## 2022-03-19 PROBLEM — M54.16 BILATERAL LUMBAR RADICULOPATHY: Status: ACTIVE | Noted: 2017-08-03

## 2022-03-19 PROBLEM — F33.0 MAJOR DEPRESSIVE DISORDER, RECURRENT, MILD (HCC): Status: ACTIVE | Noted: 2021-09-17

## 2022-03-20 PROBLEM — M47.817 FACET ARTHROPATHY, LUMBOSACRAL: Status: ACTIVE | Noted: 2017-08-03

## 2022-03-20 PROBLEM — G89.29 CHRONIC RIGHT-SIDED LOW BACK PAIN WITH RIGHT-SIDED SCIATICA: Status: ACTIVE | Noted: 2017-08-03

## 2022-03-20 PROBLEM — M54.41 CHRONIC RIGHT-SIDED LOW BACK PAIN WITH RIGHT-SIDED SCIATICA: Status: ACTIVE | Noted: 2017-08-03

## 2022-06-17 ENCOUNTER — OFFICE VISIT (OUTPATIENT)
Dept: FAMILY MEDICINE CLINIC | Facility: CLINIC | Age: 87
End: 2022-06-17
Payer: MEDICARE

## 2022-06-17 VITALS
HEIGHT: 68 IN | DIASTOLIC BLOOD PRESSURE: 54 MMHG | BODY MASS INDEX: 16.94 KG/M2 | OXYGEN SATURATION: 93 % | WEIGHT: 111.8 LBS | HEART RATE: 66 BPM | SYSTOLIC BLOOD PRESSURE: 118 MMHG

## 2022-06-17 DIAGNOSIS — I10 PRIMARY HYPERTENSION: Primary | ICD-10-CM

## 2022-06-17 PROBLEM — I73.9 CLAUDICATION (HCC): Status: RESOLVED | Noted: 2017-08-03 | Resolved: 2022-06-17

## 2022-06-17 PROCEDURE — 99213 OFFICE O/P EST LOW 20 MIN: CPT | Performed by: FAMILY MEDICINE

## 2022-06-17 PROCEDURE — 1123F ACP DISCUSS/DSCN MKR DOCD: CPT | Performed by: FAMILY MEDICINE

## 2022-06-17 ASSESSMENT — ENCOUNTER SYMPTOMS
RESPIRATORY NEGATIVE: 1
EYES NEGATIVE: 1
GASTROINTESTINAL NEGATIVE: 1

## 2022-06-17 NOTE — PROGRESS NOTES
HISTORY OF PRESENT ILLNESS    Jhonathan Fernando is a [place] Melba [age] y.o. female. HPI  Chief Complaint   Patient presents with    Hypertension     F/u visit. Has not checked BP. Feeling well. See above. Note depression symptoms are controlled. No side effects from BP medication. No headache or vision change. Denies chest pain or SOB. No swelling. Has only been taking lisinopril. Not taking Norvasc. Current Outpatient Medications on File Prior to Visit   Medication Sig Dispense Refill    escitalopram (LEXAPRO) 10 MG tablet Take 10 mg by mouth daily      lisinopril (PRINIVIL;ZESTRIL) 20 MG tablet Take 20 mg by mouth daily      meloxicam (MOBIC) 7.5 MG tablet Take 7.5-15 mg by mouth daily as needed       No current facility-administered medications on file prior to visit. Past Medical History:   Diagnosis Date    Anxiety     Arthritis     OA- generalized- severe    Carotid artery stenosis     Right/ 100% block per pt    Depression     controlle with daily meds    [place] (hard of hearing)     Hypoglycemia     Leg fracture, left     PUD (peptic ulcer disease)     Venous insufficiency     Venous stasis of lower extremity     left- as reported by pt    Venous ulcer of leg (HCC)     s/p autologous skin graft 10/07       Review of Systems   Constitutional: Negative. Eyes: Negative. Respiratory: Negative. Cardiovascular: Negative. Gastrointestinal: Negative. Genitourinary: Negative. Musculoskeletal: Negative. Neurological: Negative. Psychiatric/Behavioral: Negative. Blood pressure (!) 118/54, pulse 66, height 5' 7.5\" (1.715 m), weight 111 lb 12.8 oz (50.7 kg), SpO2 93 %. Physical Exam  Vitals and nursing note reviewed. Constitutional:       Appearance: Normal appearance. HENT:      Mouth/Throat:      Mouth: Mucous membranes are moist.      Pharynx: Oropharynx is clear. Eyes:      Conjunctiva/sclera: Conjunctivae normal.   Neck:      Vascular: No carotid bruit. Cardiovascular:      Rate and Rhythm: Normal rate and regular rhythm. Heart sounds: Normal heart sounds. Pulmonary:      Breath sounds: Normal breath sounds. Musculoskeletal:         General: No swelling. Cervical back: Neck supple. Lymphadenopathy:      Cervical: No cervical adenopathy. Psychiatric:         Mood and Affect: Mood normal.          ASSESSMENT and Ayesha Adames was seen today for hypertension. Diagnoses and all orders for this visit:    Primary hypertension       Continue lisinopril only. Return in about 4 months (around 10/17/2022), or if symptoms worsen or fail to improve.     Shayna Stringer MD

## 2022-10-14 ENCOUNTER — OFFICE VISIT (OUTPATIENT)
Dept: FAMILY MEDICINE CLINIC | Facility: CLINIC | Age: 87
End: 2022-10-14
Payer: MEDICARE

## 2022-10-14 VITALS
DIASTOLIC BLOOD PRESSURE: 84 MMHG | SYSTOLIC BLOOD PRESSURE: 130 MMHG | BODY MASS INDEX: 17.13 KG/M2 | OXYGEN SATURATION: 97 % | WEIGHT: 111 LBS | HEART RATE: 65 BPM

## 2022-10-14 DIAGNOSIS — F32.0 MAJOR DEPRESSIVE DISORDER, SINGLE EPISODE, MILD (HCC): ICD-10-CM

## 2022-10-14 DIAGNOSIS — I10 PRIMARY HYPERTENSION: Primary | ICD-10-CM

## 2022-10-14 LAB
BASOPHILS # BLD: 0.1 K/UL (ref 0–0.2)
BASOPHILS NFR BLD: 1 % (ref 0–2)
DIFFERENTIAL METHOD BLD: ABNORMAL
EOSINOPHIL # BLD: 0.2 K/UL (ref 0–0.8)
EOSINOPHIL NFR BLD: 5 % (ref 0.5–7.8)
ERYTHROCYTE [DISTWIDTH] IN BLOOD BY AUTOMATED COUNT: 15.4 % (ref 11.9–14.6)
HCT VFR BLD AUTO: 40.2 % (ref 35.8–46.3)
HGB BLD-MCNC: 12.6 G/DL (ref 11.7–15.4)
IMM GRANULOCYTES # BLD AUTO: 0 K/UL (ref 0–0.5)
IMM GRANULOCYTES NFR BLD AUTO: 0 % (ref 0–5)
LYMPHOCYTES # BLD: 1 K/UL (ref 0.5–4.6)
LYMPHOCYTES NFR BLD: 22 % (ref 13–44)
MCH RBC QN AUTO: 31.2 PG (ref 26.1–32.9)
MCHC RBC AUTO-ENTMCNC: 31.3 G/DL (ref 31.4–35)
MCV RBC AUTO: 99.5 FL (ref 82–102)
MONOCYTES # BLD: 0.5 K/UL (ref 0.1–1.3)
MONOCYTES NFR BLD: 11 % (ref 4–12)
NEUTS SEG # BLD: 2.7 K/UL (ref 1.7–8.2)
NEUTS SEG NFR BLD: 61 % (ref 43–78)
NRBC # BLD: 0 K/UL (ref 0–0.2)
PLATELET # BLD AUTO: 140 K/UL (ref 150–450)
PMV BLD AUTO: 12.4 FL (ref 9.4–12.3)
RBC # BLD AUTO: 4.04 M/UL (ref 4.05–5.2)
WBC # BLD AUTO: 4.4 K/UL (ref 4.3–11.1)

## 2022-10-14 PROCEDURE — 99213 OFFICE O/P EST LOW 20 MIN: CPT | Performed by: FAMILY MEDICINE

## 2022-10-14 PROCEDURE — 1090F PRES/ABSN URINE INCON ASSESS: CPT | Performed by: FAMILY MEDICINE

## 2022-10-14 PROCEDURE — 1123F ACP DISCUSS/DSCN MKR DOCD: CPT | Performed by: FAMILY MEDICINE

## 2022-10-14 PROCEDURE — 1036F TOBACCO NON-USER: CPT | Performed by: FAMILY MEDICINE

## 2022-10-14 PROCEDURE — G8484 FLU IMMUNIZE NO ADMIN: HCPCS | Performed by: FAMILY MEDICINE

## 2022-10-14 PROCEDURE — G8427 DOCREV CUR MEDS BY ELIG CLIN: HCPCS | Performed by: FAMILY MEDICINE

## 2022-10-14 PROCEDURE — G8419 CALC BMI OUT NRM PARAM NOF/U: HCPCS | Performed by: FAMILY MEDICINE

## 2022-10-14 RX ORDER — ESCITALOPRAM OXALATE 10 MG/1
10 TABLET ORAL DAILY
Qty: 90 TABLET | Refills: 3 | Status: SHIPPED | OUTPATIENT
Start: 2022-10-14

## 2022-10-14 RX ORDER — MELOXICAM 7.5 MG/1
7.5-15 TABLET ORAL DAILY PRN
Qty: 90 TABLET | Refills: 3 | Status: SHIPPED | OUTPATIENT
Start: 2022-10-14

## 2022-10-14 RX ORDER — LISINOPRIL 20 MG/1
20 TABLET ORAL DAILY
Qty: 90 TABLET | Refills: 3 | Status: SHIPPED | OUTPATIENT
Start: 2022-10-14

## 2022-10-14 ASSESSMENT — PATIENT HEALTH QUESTIONNAIRE - PHQ9
2. FEELING DOWN, DEPRESSED OR HOPELESS: 0
7. TROUBLE CONCENTRATING ON THINGS, SUCH AS READING THE NEWSPAPER OR WATCHING TELEVISION: 0
1. LITTLE INTEREST OR PLEASURE IN DOING THINGS: 0
4. FEELING TIRED OR HAVING LITTLE ENERGY: 0
SUM OF ALL RESPONSES TO PHQ QUESTIONS 1-9: 0
SUM OF ALL RESPONSES TO PHQ QUESTIONS 1-9: 0
2. FEELING DOWN, DEPRESSED OR HOPELESS: 0
SUM OF ALL RESPONSES TO PHQ9 QUESTIONS 1 & 2: 0
6. FEELING BAD ABOUT YOURSELF - OR THAT YOU ARE A FAILURE OR HAVE LET YOURSELF OR YOUR FAMILY DOWN: 0
3. TROUBLE FALLING OR STAYING ASLEEP: 0
SUM OF ALL RESPONSES TO PHQ QUESTIONS 1-9: 0
8. MOVING OR SPEAKING SO SLOWLY THAT OTHER PEOPLE COULD HAVE NOTICED. OR THE OPPOSITE, BEING SO FIGETY OR RESTLESS THAT YOU HAVE BEEN MOVING AROUND A LOT MORE THAN USUAL: 0
SUM OF ALL RESPONSES TO PHQ QUESTIONS 1-9: 0
10. IF YOU CHECKED OFF ANY PROBLEMS, HOW DIFFICULT HAVE THESE PROBLEMS MADE IT FOR YOU TO DO YOUR WORK, TAKE CARE OF THINGS AT HOME, OR GET ALONG WITH OTHER PEOPLE: 0
SUM OF ALL RESPONSES TO PHQ9 QUESTIONS 1 & 2: 0
1. LITTLE INTEREST OR PLEASURE IN DOING THINGS: 0
SUM OF ALL RESPONSES TO PHQ QUESTIONS 1-9: 0
9. THOUGHTS THAT YOU WOULD BE BETTER OFF DEAD, OR OF HURTING YOURSELF: 0
SUM OF ALL RESPONSES TO PHQ QUESTIONS 1-9: 0
5. POOR APPETITE OR OVEREATING: 0

## 2022-10-14 ASSESSMENT — ENCOUNTER SYMPTOMS
RESPIRATORY NEGATIVE: 1
GASTROINTESTINAL NEGATIVE: 1
EYES NEGATIVE: 1

## 2022-10-14 NOTE — PROGRESS NOTES
HISTORY OF PRESENT ILLNESS    Luc Michelle is a 80 y.o. female. HPI  Chief Complaint   Patient presents with    Follow-up     4 months    Medication Refill     See above. Feeling well with no complaints. No side effects from BP medication. No headache or vision change. Denies chest pain or SOB. No swelling. Depression symptoms are in remission on Lexapro; no side effects. No current outpatient medications on file prior to visit. No current facility-administered medications on file prior to visit. Past Medical History:   Diagnosis Date    Anxiety     Arthritis     OA- generalized- severe    Carotid artery stenosis     Right/ 100% block per pt    Depression     controlle with daily meds    [place] (hard of hearing)     Hypoglycemia     Leg fracture, left 2009    PUD (peptic ulcer disease)     Venous insufficiency     Venous stasis of lower extremity     left- as reported by pt    Venous ulcer of leg (HCC)     s/p autologous skin graft 10/07       Review of Systems   Constitutional: Negative. Eyes: Negative. Respiratory: Negative. Cardiovascular: Negative. Gastrointestinal: Negative. Genitourinary: Negative. Musculoskeletal: Negative. Neurological: Negative. Psychiatric/Behavioral:  Negative for dysphoric mood (controlled). Blood pressure 130/84, pulse 65, weight 111 lb (50.3 kg), SpO2 97 %. Physical Exam  Vitals and nursing note reviewed. Constitutional:       Appearance: Normal appearance. HENT:      Mouth/Throat:      Mouth: Mucous membranes are moist.      Pharynx: Oropharynx is clear. Eyes:      Conjunctiva/sclera: Conjunctivae normal.   Neck:      Vascular: No carotid bruit. Cardiovascular:      Rate and Rhythm: Normal rate and regular rhythm. Heart sounds: Normal heart sounds. Pulmonary:      Breath sounds: Normal breath sounds. Musculoskeletal:         General: No swelling. Cervical back: Neck supple.    Lymphadenopathy:      Cervical: No cervical adenopathy. Psychiatric:         Mood and Affect: Mood normal.        ASSESSMENT and PLAN    Santos Lopez was seen today for follow-up and medication refill. Diagnoses and all orders for this visit:    Primary hypertension  -     CBC with Auto Differential; Future  -     Comprehensive Metabolic Panel; Future  -     TSH; Future  -     TSH  -     Comprehensive Metabolic Panel  -     CBC with Auto Differential    Major depressive disorder, single episode, mild (HCC)    Other orders  -     escitalopram (LEXAPRO) 10 MG tablet; Take 1 tablet by mouth daily  -     lisinopril (PRINIVIL;ZESTRIL) 20 MG tablet; Take 1 tablet by mouth daily  -     meloxicam (MOBIC) 7.5 MG tablet; Take 1-2 tablets by mouth daily as needed for Pain   Discussed history and medications with patient. Continue current measures. Follow up if side effects occur or if new symptoms develop. Notify lab results      Return in about 6 months (around 4/14/2023), or if symptoms worsen or fail to improve.     Keke Mittal MD

## 2022-10-15 LAB
ALBUMIN SERPL-MCNC: 3.6 G/DL (ref 3.2–4.6)
ALBUMIN/GLOB SERPL: 0.9 {RATIO} (ref 0.4–1.6)
ALP SERPL-CCNC: 90 U/L (ref 50–136)
ALT SERPL-CCNC: 18 U/L (ref 12–65)
ANION GAP SERPL CALC-SCNC: 5 MMOL/L (ref 2–11)
AST SERPL-CCNC: 22 U/L (ref 15–37)
BILIRUB SERPL-MCNC: 0.8 MG/DL (ref 0.2–1.1)
BUN SERPL-MCNC: 17 MG/DL (ref 8–23)
CALCIUM SERPL-MCNC: 9.5 MG/DL (ref 8.3–10.4)
CHLORIDE SERPL-SCNC: 108 MMOL/L (ref 101–110)
CO2 SERPL-SCNC: 27 MMOL/L (ref 21–32)
CREAT SERPL-MCNC: 0.8 MG/DL (ref 0.6–1)
GLOBULIN SER CALC-MCNC: 3.9 G/DL (ref 2.8–4.5)
GLUCOSE SERPL-MCNC: 83 MG/DL (ref 65–100)
POTASSIUM SERPL-SCNC: 3.7 MMOL/L (ref 3.5–5.1)
PROT SERPL-MCNC: 7.5 G/DL (ref 6.3–8.2)
SODIUM SERPL-SCNC: 140 MMOL/L (ref 133–143)
TSH, 3RD GENERATION: 2.24 UIU/ML (ref 0.36–3.74)

## 2022-10-17 ENCOUNTER — TELEPHONE (OUTPATIENT)
Dept: FAMILY MEDICINE CLINIC | Facility: CLINIC | Age: 87
End: 2022-10-17

## 2022-10-20 ENCOUNTER — TELEPHONE (OUTPATIENT)
Dept: FAMILY MEDICINE CLINIC | Facility: CLINIC | Age: 87
End: 2022-10-20

## 2023-01-05 ENCOUNTER — APPOINTMENT (OUTPATIENT)
Dept: GENERAL RADIOLOGY | Age: 88
DRG: 559 | End: 2023-01-05
Payer: MEDICARE

## 2023-01-05 ENCOUNTER — HOSPITAL ENCOUNTER (INPATIENT)
Age: 88
LOS: 7 days | Discharge: NURSING, REHAB OR LONG-TERM CARE | DRG: 559 | End: 2023-01-12
Attending: EMERGENCY MEDICINE | Admitting: FAMILY MEDICINE
Payer: MEDICARE

## 2023-01-05 ENCOUNTER — APPOINTMENT (OUTPATIENT)
Dept: CT IMAGING | Age: 88
DRG: 559 | End: 2023-01-05
Payer: MEDICARE

## 2023-01-05 DIAGNOSIS — S72.001A CLOSED RIGHT HIP FRACTURE, INITIAL ENCOUNTER (HCC): Primary | ICD-10-CM
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
## 2023-01-05 PROBLEM — M97.8XXA PERIPROSTHETIC HIP FRACTURE, INITIAL ENCOUNTER: Status: ACTIVE | Noted: 2023-01-05

## 2023-01-05 PROBLEM — Z96.649 PERIPROSTHETIC HIP FRACTURE, INITIAL ENCOUNTER: Status: ACTIVE | Noted: 2023-01-05

## 2023-01-05 LAB
ABO + RH BLD: NORMAL
ALBUMIN SERPL-MCNC: 3.3 G/DL (ref 3.2–4.6)
ALBUMIN/GLOB SERPL: 0.8 (ref 0.4–1.6)
ALP SERPL-CCNC: 101 U/L (ref 50–136)
ALT SERPL-CCNC: 16 U/L (ref 12–65)
ANION GAP SERPL CALC-SCNC: 4 MMOL/L (ref 2–11)
AST SERPL-CCNC: 27 U/L (ref 15–37)
BASOPHILS # BLD: 0 K/UL (ref 0–0.2)
BASOPHILS NFR BLD: 0 % (ref 0–2)
BILIRUB SERPL-MCNC: 1.8 MG/DL (ref 0.2–1.1)
BLOOD GROUP ANTIBODIES SERPL: NORMAL
BUN SERPL-MCNC: 15 MG/DL (ref 8–23)
CALCIUM SERPL-MCNC: 8.3 MG/DL (ref 8.3–10.4)
CHLORIDE SERPL-SCNC: 104 MMOL/L (ref 101–110)
CO2 SERPL-SCNC: 29 MMOL/L (ref 21–32)
CREAT SERPL-MCNC: 0.8 MG/DL (ref 0.6–1)
DIFFERENTIAL METHOD BLD: ABNORMAL
EOSINOPHIL # BLD: 0 K/UL (ref 0–0.8)
EOSINOPHIL NFR BLD: 0 % (ref 0.5–7.8)
ERYTHROCYTE [DISTWIDTH] IN BLOOD BY AUTOMATED COUNT: 14.6 % (ref 11.9–14.6)
GLOBULIN SER CALC-MCNC: 4 G/DL (ref 2.8–4.5)
GLUCOSE SERPL-MCNC: 121 MG/DL (ref 65–100)
HCT VFR BLD AUTO: 33.8 % (ref 35.8–46.3)
HGB BLD-MCNC: 11.2 G/DL (ref 11.7–15.4)
IMM GRANULOCYTES # BLD AUTO: 0.1 K/UL (ref 0–0.5)
IMM GRANULOCYTES NFR BLD AUTO: 0 % (ref 0–5)
INR PPP: 1.1
LYMPHOCYTES # BLD: 1.1 K/UL (ref 0.5–4.6)
LYMPHOCYTES NFR BLD: 9 % (ref 13–44)
MCH RBC QN AUTO: 31.7 PG (ref 26.1–32.9)
MCHC RBC AUTO-ENTMCNC: 33.1 G/DL (ref 31.4–35)
MCV RBC AUTO: 95.8 FL (ref 82–102)
MONOCYTES # BLD: 1 K/UL (ref 0.1–1.3)
MONOCYTES NFR BLD: 8 % (ref 4–12)
NEUTS SEG # BLD: 10 K/UL (ref 1.7–8.2)
NEUTS SEG NFR BLD: 82 % (ref 43–78)
NRBC # BLD: 0 K/UL (ref 0–0.2)
PLATELET # BLD AUTO: 133 K/UL (ref 150–450)
PMV BLD AUTO: 11 FL (ref 9.4–12.3)
POTASSIUM SERPL-SCNC: 3.8 MMOL/L (ref 3.5–5.1)
PROT SERPL-MCNC: 7.3 G/DL (ref 6.3–8.2)
PROTHROMBIN TIME: 14.2 SEC (ref 12.6–14.3)
RBC # BLD AUTO: 3.53 M/UL (ref 4.05–5.2)
SODIUM SERPL-SCNC: 137 MMOL/L (ref 133–143)
SPECIMEN EXP DATE BLD: NORMAL
TROPONIN I SERPL HS-MCNC: 17.5 PG/ML (ref 0–14)
WBC # BLD AUTO: 12.2 K/UL (ref 4.3–11.1)

## 2023-01-05 PROCEDURE — 73502 X-RAY EXAM HIP UNI 2-3 VIEWS: CPT

## 2023-01-05 PROCEDURE — 6360000002 HC RX W HCPCS: Performed by: EMERGENCY MEDICINE

## 2023-01-05 PROCEDURE — 96374 THER/PROPH/DIAG INJ IV PUSH: CPT | Performed by: EMERGENCY MEDICINE

## 2023-01-05 PROCEDURE — 1100000000 HC RM PRIVATE

## 2023-01-05 PROCEDURE — 71045 X-RAY EXAM CHEST 1 VIEW: CPT

## 2023-01-05 PROCEDURE — 85025 COMPLETE CBC W/AUTO DIFF WBC: CPT

## 2023-01-05 PROCEDURE — 85610 PROTHROMBIN TIME: CPT

## 2023-01-05 PROCEDURE — 2500000003 HC RX 250 WO HCPCS: Performed by: EMERGENCY MEDICINE

## 2023-01-05 PROCEDURE — 70450 CT HEAD/BRAIN W/O DYE: CPT

## 2023-01-05 PROCEDURE — 72125 CT NECK SPINE W/O DYE: CPT

## 2023-01-05 PROCEDURE — 96375 TX/PRO/DX INJ NEW DRUG ADDON: CPT | Performed by: EMERGENCY MEDICINE

## 2023-01-05 PROCEDURE — 86900 BLOOD TYPING SEROLOGIC ABO: CPT

## 2023-01-05 PROCEDURE — 99285 EMERGENCY DEPT VISIT HI MDM: CPT | Performed by: EMERGENCY MEDICINE

## 2023-01-05 PROCEDURE — 93005 ELECTROCARDIOGRAM TRACING: CPT | Performed by: EMERGENCY MEDICINE

## 2023-01-05 PROCEDURE — 84484 ASSAY OF TROPONIN QUANT: CPT

## 2023-01-05 PROCEDURE — 80053 COMPREHEN METABOLIC PANEL: CPT

## 2023-01-05 PROCEDURE — 51702 INSERT TEMP BLADDER CATH: CPT | Performed by: EMERGENCY MEDICINE

## 2023-01-05 RX ORDER — ONDANSETRON 2 MG/ML
4 INJECTION INTRAMUSCULAR; INTRAVENOUS EVERY 6 HOURS PRN
Status: DISCONTINUED | OUTPATIENT
Start: 2023-01-05 | End: 2023-01-12 | Stop reason: HOSPADM

## 2023-01-05 RX ORDER — SODIUM CHLORIDE 9 MG/ML
INJECTION, SOLUTION INTRAVENOUS PRN
Status: DISCONTINUED | OUTPATIENT
Start: 2023-01-05 | End: 2023-01-12 | Stop reason: HOSPADM

## 2023-01-05 RX ORDER — SODIUM CHLORIDE 0.9 % (FLUSH) 0.9 %
5-40 SYRINGE (ML) INJECTION EVERY 12 HOURS SCHEDULED
Status: DISCONTINUED | OUTPATIENT
Start: 2023-01-05 | End: 2023-01-12 | Stop reason: HOSPADM

## 2023-01-05 RX ORDER — SODIUM CHLORIDE 0.9 % (FLUSH) 0.9 %
5-40 SYRINGE (ML) INJECTION PRN
Status: DISCONTINUED | OUTPATIENT
Start: 2023-01-05 | End: 2023-01-12 | Stop reason: HOSPADM

## 2023-01-05 RX ORDER — ONDANSETRON 2 MG/ML
4 INJECTION INTRAMUSCULAR; INTRAVENOUS
Status: COMPLETED | OUTPATIENT
Start: 2023-01-05 | End: 2023-01-05

## 2023-01-05 RX ORDER — ESCITALOPRAM OXALATE 10 MG/1
10 TABLET ORAL DAILY
Status: DISCONTINUED | OUTPATIENT
Start: 2023-01-06 | End: 2023-01-12 | Stop reason: HOSPADM

## 2023-01-05 RX ORDER — ACETAMINOPHEN 325 MG/1
650 TABLET ORAL EVERY 6 HOURS PRN
Status: DISCONTINUED | OUTPATIENT
Start: 2023-01-05 | End: 2023-01-12 | Stop reason: HOSPADM

## 2023-01-05 RX ORDER — LABETALOL HYDROCHLORIDE 5 MG/ML
20 INJECTION, SOLUTION INTRAVENOUS
Status: COMPLETED | OUTPATIENT
Start: 2023-01-05 | End: 2023-01-05

## 2023-01-05 RX ORDER — POLYETHYLENE GLYCOL 3350 17 G/17G
17 POWDER, FOR SOLUTION ORAL DAILY PRN
Status: DISCONTINUED | OUTPATIENT
Start: 2023-01-05 | End: 2023-01-12 | Stop reason: HOSPADM

## 2023-01-05 RX ORDER — ONDANSETRON 4 MG/1
4 TABLET, ORALLY DISINTEGRATING ORAL EVERY 8 HOURS PRN
Status: DISCONTINUED | OUTPATIENT
Start: 2023-01-05 | End: 2023-01-12 | Stop reason: HOSPADM

## 2023-01-05 RX ORDER — ACETAMINOPHEN 650 MG/1
650 SUPPOSITORY RECTAL EVERY 6 HOURS PRN
Status: DISCONTINUED | OUTPATIENT
Start: 2023-01-05 | End: 2023-01-12 | Stop reason: HOSPADM

## 2023-01-05 RX ORDER — HYDROMORPHONE HYDROCHLORIDE 1 MG/ML
0.5 INJECTION, SOLUTION INTRAMUSCULAR; INTRAVENOUS; SUBCUTANEOUS
Status: COMPLETED | OUTPATIENT
Start: 2023-01-05 | End: 2023-01-05

## 2023-01-05 RX ORDER — LISINOPRIL 20 MG/1
20 TABLET ORAL DAILY
Status: DISCONTINUED | OUTPATIENT
Start: 2023-01-06 | End: 2023-01-12 | Stop reason: HOSPADM

## 2023-01-05 RX ORDER — ENOXAPARIN SODIUM 100 MG/ML
40 INJECTION SUBCUTANEOUS DAILY
Status: DISCONTINUED | OUTPATIENT
Start: 2023-01-06 | End: 2023-01-06

## 2023-01-05 RX ADMIN — ONDANSETRON 4 MG: 2 INJECTION INTRAMUSCULAR; INTRAVENOUS at 20:03

## 2023-01-05 RX ADMIN — LABETALOL HYDROCHLORIDE 20 MG: 5 INJECTION INTRAVENOUS at 20:03

## 2023-01-05 RX ADMIN — HYDROMORPHONE HYDROCHLORIDE 0.5 MG: 1 INJECTION, SOLUTION INTRAMUSCULAR; INTRAVENOUS; SUBCUTANEOUS at 20:02

## 2023-01-05 ASSESSMENT — PAIN SCALES - GENERAL
PAINLEVEL_OUTOF10: 8
PAINLEVEL_OUTOF10: 6

## 2023-01-05 ASSESSMENT — PAIN - FUNCTIONAL ASSESSMENT: PAIN_FUNCTIONAL_ASSESSMENT: NONE - DENIES PAIN

## 2023-01-05 NOTE — ED TRIAGE NOTES
Patient arrived from home via EMS. Sister in law called 46, related to witnessed fall. No LOC reported. Denies hitting head. VS HR 90 /110. 98% on 2 liters per EMS.

## 2023-01-05 NOTE — ED PROVIDER NOTES
[facility] Emergency Department Provider Note                   PCP:                Thelma Stewart MD               Age: Hanna Reilly [age] y.o. Sex: female     No diagnosis found. DISPOSITION         New Prescriptions    No medications on file       Orders Placed This Encounter   Procedures    XR HIP RIGHT (2-3 VIEWS)    XR HIP LEFT (2-3 VIEWS)         Sanjana Mars is a Hanna  [age] y.o. female who presents to the Emergency Department with chief complaint of    Chief Complaint   Patient presents with    Fall      Chief complaint : Fall    HISTORY OF PRESENT ILLNESS :  Location : Right greater than left hip pain    Quality : Indescribable    Quantity : Constant    Timing : Just prior to arrival    Severity : Mild to moderate    Context : Has dementia cannot remember any details of the fall    Alleviating / exacerbating factors : Hip pain worse with movement,    Associated Symptoms : No neck pain or back pain        Review of Systems   Unable to perform ROS: Dementia   Musculoskeletal:  Positive for arthralgias. Psychiatric/Behavioral:  Positive for confusion. All other systems reviewed and are negative. All other systems reviewed and are negative.       Past Medical History:   Diagnosis Date    Anxiety     Arthritis     OA- generalized- severe    Carotid artery stenosis     Right/ 100% block per pt    Depression     controlle with daily meds    [place] (hard of hearing)     Hypoglycemia     Leg fracture, left     PUD (peptic ulcer disease)     Venous insufficiency     Venous stasis of lower extremity     left- as reported by pt    Venous ulcer of leg ([address]     s/p autologous skin graft 10/07        Past Surgical History:   Procedure Laterality Date    CATARACT REMOVAL Bilateral     COLONOSCOPY      ORTHOPEDIC SURGERY Left     lorena insertion for fx femur    TONSILLECTOMY      as a child    TOTAL HIP ARTHROPLASTY Right 2008    TOTAL KNEE ARTHROPLASTY Left         Family History   Problem Relation Age of Onset    Heart Disease Brother     Cancer Brother         bladder    Colon Cancer Father     Other Neg Hx     Post-op Cognitive Dysfunction Neg Hx     Emergence Delirium Neg Hx     Post-op Nausea/Vomiting Neg Hx     Delayed Awakening Neg Hx     Pseudochol. Deficiency Neg Hx     Malig Hypertherm Neg Hx     Heart Disease Other     Cancer Other     Osteoarthritis Other     Stroke Brother     Cancer Brother 80        bone cancer        Social Connections: Not on file        No Known Allergies     Vitals signs and nursing note reviewed. Patient Vitals for the past 4 hrs:   Temp Pulse Resp BP SpO2   01/05/23 1707 97.9 °F (36.6 °C) 94 17 (!) 203/122 99 %          Physical Exam  Vitals and nursing note reviewed. Constitutional:       General: She is not in acute distress. Appearance: Normal appearance. She is not ill-appearing, toxic-appearing or diaphoretic. HENT:      Head: Normocephalic and atraumatic. Right Ear: External ear normal.      Left Ear: External ear normal.      Nose: Nose normal. No rhinorrhea. Eyes:      General: No scleral icterus. Right eye: No discharge. Left eye: No discharge. Extraocular Movements: Extraocular movements intact. Conjunctiva/sclera: Conjunctivae normal.   Cardiovascular:      Rate and Rhythm: Normal rate and regular rhythm. Pulmonary:      Effort: Pulmonary effort is normal. No respiratory distress. Breath sounds: Normal breath sounds. Musculoskeletal:         General: Tenderness present. No deformity or signs of injury. Cervical back: Normal range of motion and neck supple. No rigidity. Right hip: Tenderness and bony tenderness present. Normal range of motion. Left hip: Tenderness present. Normal range of motion. Comments: Right greater than left hip pain but she allows pretty good range of motion   Skin:     General: Skin is warm and dry. Coloration: Skin is not jaundiced or pale.    Neurological:      General: No focal deficit present. Mental Status: She is alert. Psychiatric:         Mood and Affect: Mood normal.         Behavior: Behavior normal.        MDM  Number of Diagnoses or Management Options  Closed right hip fracture, initial encounter [facility]): new, needed workup  Fall, initial encounter: new, needed workup  Diagnosis management comments: Hanna Reilly [age] yo lady with hip pain, s/p fall today.  She has a past hx of prior hip replacemnet, and course is complicated by her dementia       Amount and/or Complexity of Data Reviewed  Clinical lab tests: ordered and reviewed  Decide to obtain previous medical records or to obtain history from someone other than the patient: yes  Obtain history from someone other than the patient: yes (Daughters at bedside interviewed as independent historians, towards end of e.r. visit  )  Discuss the patient with other providers: yes (Hi)    Risk of Complications, Morbidity, and/or Mortality  Presenting problems: moderate  Diagnostic procedures: low  Management options: low    Patient Progress  Patient progress: improved      EKG 12 Lead    Date/Time: 2023 5:15 PM  Performed by: Teddy Chilel MD  Authorized by: Teddy Chilel MD     ECG reviewed by ED Physician in the absence of a cardiologist: yes    Previous ECG:     Previous ECG:  Unavailable  Rate:     ECG rate assessment: normal    Rhythm:     Rhythm: sinus rhythm    Ectopy:     Ectopy: none    QRS:     QRS axis:  Normal    QRS intervals:  Normal    QRS conduction: normal    ST segments:     ST segments:  Non-specific  T waves:     T waves: normal    Q waves:     Abnormal Q-waves: not present      Labs Reviewed - No data to display     XR HIP RIGHT (2-3 VIEWS)    (Results Pending)   XR HIP LEFT (2-3 VIEWS)    (Results Pending)            Yusuf Coma Scale  Eye Opening: Spontaneous  Best Verbal Response: Oriented  Best Motor Response: Obeys commands  Yusuf Coma Scale Score: 15                     Voice dictation software was used during the making of this note. This software is not perfect and grammatical and other typographical errors may be present. This note has not been completely proofread for errors.         Marlyn Goodrich MD  01/05/23 103 Silvia Szymanski MD  01/05/23 2114       Marlyn Goodrich MD  01/05/23 2115       Marlyn Goodrich MD  01/05/23 3329

## 2023-01-06 ENCOUNTER — TELEPHONE (OUTPATIENT)
Dept: FAMILY MEDICINE CLINIC | Facility: CLINIC | Age: 88
End: 2023-01-06

## 2023-01-06 LAB
ANION GAP SERPL CALC-SCNC: 5 MMOL/L (ref 2–11)
APPEARANCE UR: CLEAR
BACTERIA URNS QL MICRO: NEGATIVE /HPF
BASOPHILS # BLD: 0 K/UL (ref 0–0.2)
BASOPHILS NFR BLD: 0 % (ref 0–2)
BILIRUB UR QL: NEGATIVE
BUN SERPL-MCNC: 19 MG/DL (ref 8–23)
CALCIUM SERPL-MCNC: 8.8 MG/DL (ref 8.3–10.4)
CASTS URNS QL MICRO: ABNORMAL /LPF
CHLORIDE SERPL-SCNC: 105 MMOL/L (ref 101–110)
CO2 SERPL-SCNC: 30 MMOL/L (ref 21–32)
COLOR UR: ABNORMAL
CREAT SERPL-MCNC: 0.7 MG/DL (ref 0.6–1)
DIFFERENTIAL METHOD BLD: ABNORMAL
EOSINOPHIL # BLD: 0.1 K/UL (ref 0–0.8)
EOSINOPHIL NFR BLD: 1 % (ref 0.5–7.8)
EPI CELLS #/AREA URNS HPF: ABNORMAL /HPF
ERYTHROCYTE [DISTWIDTH] IN BLOOD BY AUTOMATED COUNT: 14.6 % (ref 11.9–14.6)
GLUCOSE BLD STRIP.AUTO-MCNC: 208 MG/DL (ref 65–100)
GLUCOSE SERPL-MCNC: 95 MG/DL (ref 65–100)
GLUCOSE UR STRIP.AUTO-MCNC: NEGATIVE MG/DL
HCT VFR BLD AUTO: 29.6 % (ref 35.8–46.3)
HGB BLD-MCNC: 9.5 G/DL (ref 11.7–15.4)
HGB UR QL STRIP: ABNORMAL
IMM GRANULOCYTES # BLD AUTO: 0 K/UL (ref 0–0.5)
IMM GRANULOCYTES NFR BLD AUTO: 0 % (ref 0–5)
KETONES UR QL STRIP.AUTO: NEGATIVE MG/DL
LEUKOCYTE ESTERASE UR QL STRIP.AUTO: NEGATIVE
LYMPHOCYTES # BLD: 0.9 K/UL (ref 0.5–4.6)
LYMPHOCYTES NFR BLD: 10 % (ref 13–44)
MCH RBC QN AUTO: 31.6 PG (ref 26.1–32.9)
MCHC RBC AUTO-ENTMCNC: 32.1 G/DL (ref 31.4–35)
MCV RBC AUTO: 98.3 FL (ref 82–102)
MONOCYTES # BLD: 0.8 K/UL (ref 0.1–1.3)
MONOCYTES NFR BLD: 9 % (ref 4–12)
NEUTS SEG # BLD: 6.7 K/UL (ref 1.7–8.2)
NEUTS SEG NFR BLD: 79 % (ref 43–78)
NITRITE UR QL STRIP.AUTO: NEGATIVE
NRBC # BLD: 0 K/UL (ref 0–0.2)
PH UR STRIP: 8 (ref 5–9)
PLATELET # BLD AUTO: 120 K/UL (ref 150–450)
PMV BLD AUTO: 11.2 FL (ref 9.4–12.3)
POTASSIUM SERPL-SCNC: 4.1 MMOL/L (ref 3.5–5.1)
PROT UR STRIP-MCNC: 30 MG/DL
RBC # BLD AUTO: 3.01 M/UL (ref 4.05–5.2)
RBC #/AREA URNS HPF: >100 /HPF
SERVICE CMNT-IMP: ABNORMAL
SODIUM SERPL-SCNC: 140 MMOL/L (ref 133–143)
SP GR UR REFRACTOMETRY: 1.01 (ref 1–1.02)
UROBILINOGEN UR QL STRIP.AUTO: 0.2 EU/DL (ref 0.2–1)
WBC # BLD AUTO: 8.4 K/UL (ref 4.3–11.1)
WBC URNS QL MICRO: ABNORMAL /HPF

## 2023-01-06 PROCEDURE — 2580000003 HC RX 258: Performed by: FAMILY MEDICINE

## 2023-01-06 PROCEDURE — 6370000000 HC RX 637 (ALT 250 FOR IP): Performed by: NURSE PRACTITIONER

## 2023-01-06 PROCEDURE — 80048 BASIC METABOLIC PNL TOTAL CA: CPT

## 2023-01-06 PROCEDURE — 85025 COMPLETE CBC W/AUTO DIFF WBC: CPT

## 2023-01-06 PROCEDURE — 27238 TREAT THIGH FRACTURE: CPT | Performed by: PHYSICIAN ASSISTANT

## 2023-01-06 PROCEDURE — 81001 URINALYSIS AUTO W/SCOPE: CPT

## 2023-01-06 PROCEDURE — 99223 1ST HOSP IP/OBS HIGH 75: CPT | Performed by: PHYSICIAN ASSISTANT

## 2023-01-06 PROCEDURE — 6370000000 HC RX 637 (ALT 250 FOR IP): Performed by: FAMILY MEDICINE

## 2023-01-06 PROCEDURE — 1100000000 HC RM PRIVATE

## 2023-01-06 PROCEDURE — 82962 GLUCOSE BLOOD TEST: CPT

## 2023-01-06 PROCEDURE — 87086 URINE CULTURE/COLONY COUNT: CPT

## 2023-01-06 RX ORDER — TRAMADOL HYDROCHLORIDE 50 MG/1
50 TABLET ORAL EVERY 6 HOURS PRN
Status: DISCONTINUED | OUTPATIENT
Start: 2023-01-06 | End: 2023-01-12 | Stop reason: HOSPADM

## 2023-01-06 RX ORDER — HYDRALAZINE HYDROCHLORIDE 20 MG/ML
10 INJECTION INTRAMUSCULAR; INTRAVENOUS EVERY 6 HOURS PRN
Status: DISCONTINUED | OUTPATIENT
Start: 2023-01-06 | End: 2023-01-12 | Stop reason: HOSPADM

## 2023-01-06 RX ORDER — ENOXAPARIN SODIUM 100 MG/ML
30 INJECTION SUBCUTANEOUS DAILY
Status: DISCONTINUED | OUTPATIENT
Start: 2023-01-07 | End: 2023-01-12 | Stop reason: HOSPADM

## 2023-01-06 RX ADMIN — ESCITALOPRAM OXALATE 10 MG: 10 TABLET ORAL at 14:58

## 2023-01-06 RX ADMIN — TRAMADOL HYDROCHLORIDE 50 MG: 50 TABLET ORAL at 22:42

## 2023-01-06 RX ADMIN — SODIUM CHLORIDE, PRESERVATIVE FREE 10 ML: 5 INJECTION INTRAVENOUS at 19:48

## 2023-01-06 RX ADMIN — LISINOPRIL 20 MG: 20 TABLET ORAL at 14:58

## 2023-01-06 RX ADMIN — SODIUM CHLORIDE, PRESERVATIVE FREE 10 ML: 5 INJECTION INTRAVENOUS at 02:33

## 2023-01-06 ASSESSMENT — PAIN DESCRIPTION - FREQUENCY: FREQUENCY: INTERMITTENT

## 2023-01-06 ASSESSMENT — PAIN SCALES - GENERAL
PAINLEVEL_OUTOF10: 5
PAINLEVEL_OUTOF10: 0
PAINLEVEL_OUTOF10: 9

## 2023-01-06 ASSESSMENT — PAIN DESCRIPTION - LOCATION: LOCATION: HIP

## 2023-01-06 ASSESSMENT — PAIN DESCRIPTION - PAIN TYPE: TYPE: ACUTE PAIN

## 2023-01-06 ASSESSMENT — PAIN DESCRIPTION - ONSET: ONSET: GRADUAL

## 2023-01-06 ASSESSMENT — PAIN DESCRIPTION - DESCRIPTORS: DESCRIPTORS: ACHING

## 2023-01-06 NOTE — H&P
Hospitalist History and Physical   Admit Date:  2023  4:59 PM   Name:  Todd Lares   Age:  80 y.o. Sex:  female  :  1922   MRN:  [id number]   [facility]    Presenting Complaint: Fall     Reason(s) for Admission: Periprosthetic hip fracture, initial encounter [M97. Jaqueline Caballero     History of Present Illness:   Todd Lares is a 80 y.o. female with medical history of hypertension, dementia, anxiety presented to ED after an unwitnessed fall. Patient lives at home by herself. Today sister-in-law found her on floor and called EMS. Patient does not remember what happened. Denies loss of consciousness. Patient complaining of hip pain since fall. No other complaint. Denies chest pain, palpitation, nausea, vomiting or abdominal pain. She uses cane at home. Not on any blood thinner. Noted strong urinary odor while at bedside. In the ED patient was vitally stable. Labs notable for mild leukocytosis otherwise unremarkable. X-ray hip showed periprosthetic fracture of the right proximal femur. CT head and CT cervical spine negative for fracture. Hospitalist consulted for admission. Assessment & Plan:     Periprosthetic fracture of right proximal femur:  Ortho consulted, appreciate recommendation  N.p.o. after midnight in case patient need any intervention  PT/OT as per Ortho  Pain control  Patient likely need placement on discharge, PPD ordered    Leukocytosis:  Likely reactive but patient also with strong urinary odor  Check UA  Chest x-ray without acute pathology  Monitor off antibiotic, will start antibiotic if needed    Hypertension:  Continue lisinopril    Mood disorder:  Continue Lexapro      Anticipated discharge needs: Likely need placement, PPD ordered    Diet: ADULT DIET;  Regular  Diet NPO  VTE ppx: Lovenox  Code status: Full Code    Hospital Problems:  Principal Problem:    Periprosthetic hip fracture, initial encounter  Resolved Problems:    * No resolved hospital problems. *       Past History:     Past Medical History:   Diagnosis Date    Anxiety     Arthritis     OA- generalized- severe    Carotid artery stenosis     Right/ 100% block per pt    Depression     controlle with daily meds    [place] (hard of hearing)     Hypoglycemia     Leg fracture, left 2009    PUD (peptic ulcer disease)     Venous insufficiency     Venous stasis of lower extremity     left- as reported by pt    Venous ulcer of leg ([address]     s/p autologous skin graft 10/07       Past Surgical History:   Procedure Laterality Date    CATARACT REMOVAL Bilateral     COLONOSCOPY  2008    ORTHOPEDIC SURGERY Left 2009    lorena insertion for fx femur    TONSILLECTOMY      as a child    TOTAL HIP ARTHROPLASTY Right 2008    TOTAL KNEE ARTHROPLASTY Left 2000        Social History     Tobacco Use    Smoking status: Never    Smokeless tobacco: Never   Substance Use Topics    Alcohol use: No      Social History     Substance and Sexual Activity   Drug Use No       Family History   Problem Relation Age of Onset    Heart Disease Brother     Cancer Brother         bladder    Colon Cancer Father     Other Neg Hx     Post-op Cognitive Dysfunction Neg Hx     Emergence Delirium Neg Hx     Post-op Nausea/Vomiting Neg Hx     Delayed Awakening Neg Hx     Pseudochol. Deficiency Neg Hx     Malig Hypertherm Neg Hx     Heart Disease Other     Cancer Other     Osteoarthritis Other     Stroke Brother     Cancer Brother 80        bone cancer        There is no immunization history for the selected administration types on file for this patient.   No Known Allergies  Prior to Admit Medications:  Current Outpatient Medications   Medication Instructions    escitalopram (LEXAPRO) 10 mg, Oral, DAILY    lisinopril (PRINIVIL;ZESTRIL) 20 mg, Oral, DAILY    meloxicam (MOBIC) 7.5-15 mg, Oral, DAILY PRN         Objective:   Patient Vitals for the past 24 hrs:   Temp Pulse Resp BP SpO2   01/05/23 2040 -- 62 13 (!) 116/50 95 %   01/05/23 2030 -- 60 17 (!) 148/64 97 %   01/05/23 2015 -- 65 17 121/71 94 %   01/05/23 2003 -- 87 -- (!) 211/79 --   01/05/23 1707 97.9 °F (36.6 °C) 94 17 (!) 203/122 99 %       Oxygen Therapy  SpO2: 95 %  Pulse Oximeter Device Mode: Continuous  O2 Device: Nasal cannula    Estimated body mass index is 21.14 kg/m² as calculated from the following:    Height as of this encounter: 5' 7\" (1.702 m). Weight as of this encounter: 135 lb (61.2 kg). No intake or output data in the 24 hours ending 01/05/23 2143      Physical Exam:    Blood pressure (!) 116/50, pulse 62, temperature 97.9 °F (36.6 °C), temperature source Oral, resp. rate 13, height 5' 7\" (1.702 m), weight 135 lb (61.2 kg), SpO2 95 %. General:    Well nourished. Head:  Normocephalic, atraumatic  Eyes:  Sclerae appear normal.  Pupils equally round. ENT:  Nares appear normal.  Moist oral mucosa  Neck:  No restricted ROM. Trachea midline   CV:   RRR. No m/r/g. No jugular venous distension. Lungs:   CTAB. No wheezing, rhonchi, or rales. Symmetric expansion. Abdomen:   Soft, nontender, nondistended. Extremities: No cyanosis or clubbing. No edema  Skin:     Limited right hip range of motion due to pain. Neuro:  CN II-XII grossly intact. Sensation intact.    Psych:  Baseline dementia, answering simple questions    I have personally reviewed labs and tests showing:  Recent Labs:  Recent Results (from the past 24 hour(s))   Comprehensive Metabolic Panel    Collection Time: 01/05/23  7:49 PM   Result Value Ref Range    Sodium 137 133 - 143 mmol/L    Potassium 3.8 3.5 - 5.1 mmol/L    Chloride 104 101 - 110 mmol/L    CO2 29 21 - 32 mmol/L    Anion Gap 4 2 - 11 mmol/L    Glucose 121 (H) 65 - 100 mg/dL    BUN 15 8 - 23 MG/DL    Creatinine 0.80 0.6 - 1.0 MG/DL    Est, Glom Filt Rate >60 >60 ml/min/1.73m2    Calcium 8.3 8.3 - 10.4 MG/DL    Total Bilirubin 1.8 (H) 0.2 - 1.1 MG/DL    ALT 16 12 - 65 U/L    AST 27 15 - 37 U/L    Alk Phosphatase 101 50 - 136 U/L    Total Protein 7.3 6.3 - 8.2 g/dL    Albumin 3.3 3.2 - 4.6 g/dL    Globulin 4.0 2.8 - 4.5 g/dL    Albumin/Globulin Ratio 0.8 0.4 - 1.6     Protime-INR    Collection Time: 01/05/23  7:49 PM   Result Value Ref Range    Protime 14.2 12.6 - 14.3 sec    INR 1.1     TYPE AND SCREEN    Collection Time: 01/05/23  7:49 PM   Result Value Ref Range    Crossmatch expiration date 01/08/2023,2359     ABO/Rh A POSITIVE     Antibody Screen NEG    CBC with Auto Differential    Collection Time: 01/05/23  7:49 PM   Result Value Ref Range    WBC 12.2 (H) 4.3 - 11.1 K/uL    RBC 3.53 (L) 4.05 - 5.2 M/uL    Hemoglobin 11.2 (L) 11.7 - 15.4 g/dL    Hematocrit 33.8 (L) 35.8 - 46.3 %    MCV 95.8 82 - 102 FL    MCH 31.7 26.1 - 32.9 PG    MCHC 33.1 31.4 - 35.0 g/dL    RDW 14.6 11.9 - 14.6 %    Platelets 619 (L) 574 - 450 K/uL    MPV 11.0 9.4 - 12.3 FL    nRBC 0.00 0.0 - 0.2 K/uL    Differential Type AUTOMATED      Seg Neutrophils 82 (H) 43 - 78 %    Lymphocytes 9 (L) 13 - 44 %    Monocytes 8 4.0 - 12.0 %    Eosinophils % 0 (L) 0.5 - 7.8 %    Basophils 0 0.0 - 2.0 %    Immature Granulocytes 0 0.0 - 5.0 %    Segs Absolute 10.0 (H) 1.7 - 8.2 K/UL    Absolute Lymph # 1.1 0.5 - 4.6 K/UL    Absolute Mono # 1.0 0.1 - 1.3 K/UL    Absolute Eos # 0.0 0.0 - 0.8 K/UL    Basophils Absolute 0.0 0.0 - 0.2 K/UL    Absolute Immature Granulocyte 0.1 0.0 - 0.5 K/UL   Troponin    Collection Time: 01/05/23  7:49 PM   Result Value Ref Range    Troponin, High Sensitivity 17.5 (H) 0 - 14 pg/mL       I have personally reviewed imaging studies showing:  XR HIP LEFT (2-3 VIEWS)    Result Date: 1/5/2023  BILATERAL HIP RADIOGRAPHS, 1/5/2023. CLINICAL HISTORY: Fall with bilateral hip pain. COMPARISON STUDY: None. FINDINGS: A frontal view of the pelvis and AP and lateral view of the bilateral hips are submitted for evaluation. Assessment is limited by patient osteopenia. The sacroiliac joints and pubic symphysis are intact. The pelvic ring is grossly intact.  The patient is status post right total hip replacement in interval fixation of left femur. AP and lateral views of the right hip show normal abnormal fracture line extending obliquely through the most proximal lateral diaphysis of the femur appearing to extend into the greater trochanter. Some comminution is suggested. The appearance is concerning for an acute periprosthetic fracture. No dislocation is seen. AP and lateral views of the right hip show internal fixation of the left femur and a partially visualized left total knee replacement. No acute fractures seen of the left hip or visualized femur. No evidence for dislocation is seen. 1. Periprosthetic fracture of the right proximal femur as described above. 2. No acute osseous abnormality of the left hip. XR HIP RIGHT (2-3 VIEWS)    Result Date: 1/5/2023  BILATERAL HIP RADIOGRAPHS, 1/5/2023. CLINICAL HISTORY: Fall with bilateral hip pain. COMPARISON STUDY: None. FINDINGS: A frontal view of the pelvis and AP and lateral view of the bilateral hips are submitted for evaluation. Assessment is limited by patient osteopenia. The sacroiliac joints and pubic symphysis are intact. The pelvic ring is grossly intact. The patient is status post right total hip replacement in interval fixation of left femur. AP and lateral views of the right hip show normal abnormal fracture line extending obliquely through the most proximal lateral diaphysis of the femur appearing to extend into the greater trochanter. Some comminution is suggested. The appearance is concerning for an acute periprosthetic fracture. No dislocation is seen. AP and lateral views of the right hip show internal fixation of the left femur and a partially visualized left total knee replacement. No acute fractures seen of the left hip or visualized femur. No evidence for dislocation is seen. 1. Periprosthetic fracture of the right proximal femur as described above. 2. No acute osseous abnormality of the left hip.      CT Head W/O Contrast    Result Date: 1/5/2023  CT HEAD, AND CERVICAL SPINE WITHOUT CONTRAST, 1/5/2023. History: Fall. Comparison: None. Technique:   5 mm axial scans from the skull base to the vertex, and 1.25 mm axial scans from the skull base into the upper chest performed, and sagittal and coronal reconstructed images performed. All CT scans performed at this facility use one or all of the following: Automated exposure control, adjustment of the mA and/or kVp according to patient's size, iterative reconstruction. CT HEAD: Findings:  No evidence of intracranial hemorrhage is seen. Age-related chronic cortical volume loss is seen. No abnormal extra-axial fluid collections are seen. The ventricles are normal in size and configuration. No evidence of midline shift or obvious mass effect is seen. No abnormal edema pattern is seen in a vascular distribution to suggest large artery infarction. Only mild ill-defined periventricular white matter hypoattenuation is seen favored to represent chronic microangiopathic change. 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 No abnormal fluid collections are seen associated with the aerated sinuses. CT CERVICAL SPINE: The skull base is unremarkable although not well evaluated due to bone reconstruction algorithm. Vertebral body height is maintained at all levels. No evidence of acute fracture is seen. Reconstructed images show maintained alignment. The dens is intact, and the pre dens space is normal. Only chronic degenerative changes are seen most prominently involving the dens. Prevertebral soft tissues are normal. Limited evaluation of the lung apices shows a 1.5 cm x 0.9 cm right upper lobe nodule.  Although partially calcified, the pattern of calcification is not felt to clearly indicate a benign finding. No prior studies are available at this institution demonstrating long-term stability. This is incompletely characterized. 1.  No acute intracranial process evident by noncontrast CT study of the head. 2. No acute osseous abnormality the bony calvarium, skull base, or cervical spine. 3 1.5 cm x 0.9 cm right upper lobe nodule. A benign process cannot be confirmed and long-term stability cannot be demonstrated due to the lack of prior comparison studies at this institution. A pulmonary or pleural would be recommended for further assessment and potential management. By imaging, this would be best initially assessed with a preferably contrasted CT scan of the chest.    CT CSpine W/O Contrast    Result Date: 1/5/2023  CT HEAD, AND CERVICAL SPINE WITHOUT CONTRAST, 1/5/2023. History: Fall. Comparison: None. Technique:   5 mm axial scans from the skull base to the vertex, and 1.25 mm axial scans from the skull base into the upper chest performed, and sagittal and coronal reconstructed images performed. All CT scans performed at this facility use one or all of the following: Automated exposure control, adjustment of the mA and/or kVp according to patient's size, iterative reconstruction. CT HEAD: Findings:  No evidence of intracranial hemorrhage is seen. Age-related chronic cortical volume loss is seen. No abnormal extra-axial fluid collections are seen. The ventricles are normal in size and configuration. No evidence of midline shift or obvious mass effect is seen. No abnormal edema pattern is seen in a vascular distribution to suggest large artery infarction. Only mild ill-defined periventricular white matter hypoattenuation is seen favored to represent chronic microangiopathic change.  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 No abnormal fluid collections are seen associated with the aerated sinuses. CT CERVICAL SPINE: The skull base is unremarkable although not well evaluated due to bone reconstruction algorithm. Vertebral body height is maintained at all levels. No evidence of acute fracture is seen. Reconstructed images show maintained alignment. The dens is intact, and the pre dens space is normal. Only chronic degenerative changes are seen most prominently involving the dens. Prevertebral soft tissues are normal. Limited evaluation of the lung apices shows a 1.5 cm x 0.9 cm right upper lobe nodule. Although partially calcified, the pattern of calcification is not felt to clearly indicate a benign finding. No prior studies are available at this institution demonstrating long-term stability. This is incompletely characterized. 1.  No acute intracranial process evident by noncontrast CT study of the head. 2. No acute osseous abnormality the bony calvarium, skull base, or cervical spine. 3 1.5 cm x 0.9 cm right upper lobe nodule. A benign process cannot be confirmed and long-term stability cannot be demonstrated due to the lack of prior comparison studies at this institution. A pulmonary or pleural would be recommended for further assessment and potential management. By imaging, this would be best initially assessed with a preferably contrasted CT scan of the chest.    XR CHEST 1 VIEW    Result Date: 1/5/2023  CHEST X-RAY, single portable view  1/5/2023 History: Preoperative chest x-ray for fracture repair. Technique: Single frontal view of the chest. Comparison: Chest x-ray 6/29/2020 Findings: The cardiac silhouette is moderately enlarged although stable. The lungs are expanded without evidence for pneumothorax. No evolving consolidative airspace process or pleural effusion is seen.      1. Stable moderate cardiomegaly without evolving acute changes evident by plain film. Echocardiogram:  No results found for this or any previous visit. Orders Placed This Encounter   Medications    HYDROmorphone HCl PF (DILAUDID) injection 0.5 mg    labetalol (NORMODYNE;TRANDATE) injection 20 mg    ondansetron (ZOFRAN) injection 4 mg    escitalopram (LEXAPRO) tablet 10 mg    lisinopril (PRINIVIL;ZESTRIL) tablet 20 mg    sodium chloride flush 0.9 % injection 5-40 mL    sodium chloride flush 0.9 % injection 5-40 mL    0.9 % sodium chloride infusion    enoxaparin (LOVENOX) injection 40 mg     Order Specific Question:   Indication of Use     Answer:   Prophylaxis-DVT/PE    OR Linked Order Group     ondansetron (ZOFRAN-ODT) disintegrating tablet 4 mg     ondansetron (ZOFRAN) injection 4 mg    polyethylene glycol (GLYCOLAX) packet 17 g    OR Linked Order Group     acetaminophen (TYLENOL) tablet 650 mg     acetaminophen (TYLENOL) suppository 650 mg    tuberculin injection 5 Units         Signed:  Christy Iglesias MD    Part of this note may have been written by using a voice dictation software. The note has been proof read but may still contain some grammatical/other typographical errors.

## 2023-01-06 NOTE — PROGRESS NOTES
Physical Therapy Note:    Physical therapy evaluation orders received and chart reviewed. Orthopedic consult pending at this time. Will follow and re-attempt at a later time/date as schedule permits/patient available.  Thank you,    Pastora Bedoya, PT, DPT  1/6/23 7:49AM

## 2023-01-06 NOTE — TELEPHONE ENCOUNTER
Patient's sister-in-law, Filemon Sunshine called in regards to patient Gregory Miramontes. She wanted to speak urgently with Dr Robbin Jacob. She wanted to inform him that Nelsy Hernandez fell yesterday and broke her hip. Nelsy Hernandez is in the ER at [facility] right now. Luc Mendenhall would like to speak to Dr Robbin Jacob when he gets a chance.  [phone]

## 2023-01-06 NOTE — CONSULTS
[facility]  Consultation Note    Patient ID:  Name: Dakota Palacios  MRN: [id number]  AGE: Hanna  [age] y.o.  : 1922    Date of Consultation:  2023  Referring Physician:  Hospitalist     Subjective: Pt complains of right hip pain after a ground level fall at home. She is very hard of hearing. She lives alone. Her niece, Manolo Palomo, is POA and with her. They presented to the [facility] Emergency Dept last night. She has a history of right  total hip arthroplasty many years ago. They were found to have closed right greater trochanter periprosthetic fracture. They were admitted by the hospitalist. They localizes their pain to the right hip. She has no knee pain. They have pain with movement. They have no other orthopedic concerns at this time. Past Medical History Includes:   Past Medical History:   Diagnosis Date    Anxiety     Arthritis     OA- generalized- severe    Carotid artery stenosis     Right/ 100% block per pt    Depression     controlle with daily meds    [place] (hard of hearing)     Hypoglycemia     Leg fracture, left     PUD (peptic ulcer disease)     Venous insufficiency     Venous stasis of lower extremity     left- as reported by pt    Venous ulcer of leg ([address]     s/p autologous skin graft 10/07   ,   Past Surgical History:   Procedure Laterality Date    CATARACT REMOVAL Bilateral     COLONOSCOPY  2008    ORTHOPEDIC SURGERY Left     lorena insertion for fx femur    TONSILLECTOMY      as a child    TOTAL HIP ARTHROPLASTY Right     TOTAL KNEE ARTHROPLASTY Left      Family History:   Family History   Problem Relation Age of Onset    Heart Disease Brother     Cancer Brother         bladder    Colon Cancer Father     Other Neg Hx     Post-op Cognitive Dysfunction Neg Hx     Emergence Delirium Neg Hx     Post-op Nausea/Vomiting Neg Hx     Delayed Awakening Neg Hx     Pseudochol.  Deficiency Neg Hx     Malig Hypertherm Neg Hx     Heart Disease Other     Cancer Other Osteoarthritis Other     Stroke Brother     Cancer Brother 80        bone cancer      Social History:   Social History     Tobacco Use    Smoking status: Never    Smokeless tobacco: Never   Substance Use Topics    Alcohol use: No       ALLERGIES: No Known Allergies     Patient Medications    Current Facility-Administered Medications   Medication Dose Route Frequency    [START ON 1/7/2023] enoxaparin Sodium (LOVENOX) injection 30 mg  30 mg SubCUTAneous Daily    escitalopram (LEXAPRO) tablet 10 mg  10 mg Oral Daily    lisinopril (PRINIVIL;ZESTRIL) tablet 20 mg  20 mg Oral Daily    sodium chloride flush 0.9 % injection 5-40 mL  5-40 mL IntraVENous 2 times per day    sodium chloride flush 0.9 % injection 5-40 mL  5-40 mL IntraVENous PRN    0.9 % sodium chloride infusion   IntraVENous PRN    ondansetron (ZOFRAN-ODT) disintegrating tablet 4 mg  4 mg Oral Q8H PRN    Or    ondansetron (ZOFRAN) injection 4 mg  4 mg IntraVENous Q6H PRN    polyethylene glycol (GLYCOLAX) packet 17 g  17 g Oral Daily PRN    acetaminophen (TYLENOL) tablet 650 mg  650 mg Oral Q6H PRN    Or    acetaminophen (TYLENOL) suppository 650 mg  650 mg Rectal Q6H PRN    tuberculin injection 5 Units  5 Units IntraDERmal Once     Current Outpatient Medications   Medication Sig    escitalopram (LEXAPRO) 10 MG tablet Take 1 tablet by mouth daily    lisinopril (PRINIVIL;ZESTRIL) 20 MG tablet Take 1 tablet by mouth daily    meloxicam (MOBIC) 7.5 MG tablet Take 1-2 tablets by mouth daily as needed for Pain         Review of Systems:  Pertinent items are noted in HPI.     Physical Exam:      General: NAD, Alert, Oriented to place and purpose  Mental Status: Appropriate   Psych: Normal Affect, Normal Mood    HEENT: Normal Cephalic/Atraumatic, PERRL   Lungs: Respirations even and unlabored, Breath Sounds were clear, no respiratory distress   Heart: Regular Rate and Rhythm   Vascular: Distal pulses intact, good capillary refill   Skin: No redness, No Rashes, Skin is dry   Musculoskeletal: ROM not assessed due to fracture. No pain on palpation of the right knee. Minimal swelling right hip. NV intact. Calfs are soft. No wounds  Lymphatic: No lympahdenopathy, No distal edema   Neuro: No gross deficits   Abdomen: Soft, Non tender, No distension      VITALS: Patient Vitals for the past 8 hrs:   BP Pulse Resp SpO2   23 0729 (!) 119/50 72 18 --   23 0623 -- 69 17 93 %   23 0508 -- 65 14 94 %    , Temp (24hrs), Av.9 °F (36.6 °C), Min:97.9 °F (36.6 °C), Max:97.9 °F (36.6 °C)           Diagnosis   Patient Active Problem List   Diagnosis    Major depressive disorder, recurrent, unspecified (HCC)    Bilateral lumbar radiculopathy    Fracture, femur, shaft (HCC)    Major depressive disorder, recurrent, moderate (HCC)    Major depressive disorder, recurrent, mild (HCC)    Osteopenia    Facet arthropathy, lumbosacral    Chronic right-sided low back pain with right-sided sciatica    Periprosthetic hip fracture, initial encounter          Assessment      Closed right periprosthetic greater trochanter fracture    Medical Decision Making:     X-rays and chart have been reviewed. The patient was discussed with Dr. Lewis Duncan who reviewed the x-rays as well. The patient has a closed periprosthetic fracture involving the right greater trochanter. She may be toe down weightbearing with a walker. No hip abduction. She will require rehab placement as she lives alone. We will follow-up in the office in 2 weeks or sooner if needed.         MICHELLE Corbett, PA  2023,  9:30 AM

## 2023-01-06 NOTE — PROGRESS NOTES
Occupational Therapy Note:    Attempted to see patient this AM for occupational therapy evaluation session. Patient pending orthopedic consult for R hip fracture.  Will follow and re-attempt as schedule permits/patient available per Ortho MD orders Thank you,    JAKE Lacey Gist, OT    Rehab Caseload Tracker

## 2023-01-06 NOTE — PROGRESS NOTES
TRANSFER - IN REPORT:    Verbal report received from [facility]  on Geraldine Bell  being received from ED for routine progression of patient care      Report consisted of patient's Situation, Background, Assessment and   Recommendations(SBAR). Information from the following report(s) Nurse Handoff Report was reviewed with the receiving nurse. Opportunity for questions and clarification was provided. Assessment completed upon patient's arrival to unit and care assumed.

## 2023-01-06 NOTE — ED NOTES
TRANSFER - OUT REPORT:    Verbal report given to [facility] RN on Delilah Gibbons  being transferred to  for routine progression of patient care       Report consisted of patient's Situation, Background, Assessment and   Recommendations(SBAR). Information from the following report(s) ED SBAR was reviewed with the receiving nurse. [place] Assessment: No data recorded  Lines:   Peripheral IV 01/05/23 Left;Dorsal Forearm (Active)        Opportunity for questions and clarification was provided.       Patient transported with:  Transport         Masha Lares RN  01/06/23 1189

## 2023-01-06 NOTE — ED NOTES
Pt had purwick placed when she arrived to the ED. It is still in place at this time.      [address] [facility] [place], RN  01/05/23 2026

## 2023-01-06 NOTE — DISCHARGE INSTRUCTIONS
[address] [place] [address]      IF YOU HAVE ANY PROBLEMS ONCE YOU ARE AT HOME CALL THE FOLLOWING NUMBERS:   Main office number: [phone] ask for Diana Anderson (medical assistant with Dr. Lewis Duncan)  Office Address: [facility] [address] [address] [address] [facility], [address] [facility]      Patient Discharge Instructions    Stacy Mcginnis / [id number] : 1922    Admitted 2023 Discharged: 2023         To be given to [facility] on Admission               Weight bearing status: Toe down weight bearing right leg     Activity  Continue Physical Therapy and Occupational Therapy: true toe down weight bearing right leg. No right hip abduction. Fall precautions     Diet  Resume regular or diabetic diet      Follow up   Follow up in our office in 4 weeks with Dr. Lewis Duncan. All patients are to be transported via stretcher unless they are able to independently get out of a chair and stand without assistance.

## 2023-01-06 NOTE — PROGRESS NOTES
Hospitalist Progress Note   Admit Date:  2023  4:59 PM   Name:  Dakota Palacios   Age:  80 y.o. Sex:  female  :  1922   MRN:  [id number]   Room:  ER/    Presenting Complaint: Fall     Reason(s) for Admission: Periprosthetic hip fracture, initial encounter [M97. Mikel Cordero Course:   Dakota Palacios is a 80 y.o. female with a PMH of hypertension, dementia, anxiety who presented to the ER after an unwitnessed fall. Patient lives at home by herself. Found by sister-in-law. Patient does not remember what happened. Denies loss of consciousness. Patient complaining of hip pain since fall. No other complaint. Not on any blood thinner. In the ER, patient was vitally stable. Labs notable for mild leukocytosis, otherwise unremarkable. XR hip showed periprosthetic fracture of the right proximal femur. CT head and CT cervical spine negative for fracture. Hospitalist consulted for admission and Ortho surgery was consulted. They will treat non-operatively. The patient will need rehab at discharge. Subjective & 24hr Events (23): I discussed the case with MICHELLE Ratliff Ortho surgery. The patient is stable at this time. Will follow PT/OT evaluations. PPD ordered. Assessment & Plan:     Acute periprosthetic fracture of right proximal femur  Plain films reviewed  Ortho consulted and will treat non-operatively  PT/OT/PPD  Pain control     Leukocytosis  Likely reactive, now resolved  UA negative  CXR without acute pathology     Hypertension  Continue lisinopril  PRN hydralazine    Mood disorder  Continue Lexapro      Anticipated discharge needs:  Rehab       Diet:  ADULT DIET; Regular  DVT PPx: enoxaparin  Code status: DNR    Hospital Problems:  Principal Problem:    Periprosthetic hip fracture, initial encounter  Resolved Problems:    * No resolved hospital problems.  *      Objective:   Patient Vitals for the past 24 hrs:   Temp Pulse Resp BP SpO2   23 0729 -- 72 18 (!) 119/50 --   01/06/23 0623 -- 69 17 -- 93 %   01/06/23 0508 -- 65 14 -- 94 %   01/06/23 0025 -- 68 24 (!) 145/60 95 %   01/05/23 2355 -- 64 16 (!) 130/57 95 %   01/05/23 2328 -- 63 16 (!) 146/61 91 %   01/05/23 2318 -- 63 14 138/84 95 %   01/05/23 2258 -- 67 16 (!) 148/63 96 %   01/05/23 2148 -- 68 12 (!) 167/67 95 %   01/05/23 2040 -- 62 13 (!) 116/50 95 %   01/05/23 2030 -- 60 17 (!) 148/64 97 %   01/05/23 2015 -- 65 17 121/71 94 %   01/05/23 2003 -- 87 -- (!) 211/79 --   01/05/23 1707 97.9 °F (36.6 °C) 94 17 (!) 203/122 99 %       Oxygen Therapy  SpO2: 93 %  Pulse Oximeter Device Mode: Continuous  O2 Device: Nasal cannula    Estimated body mass index is 21.14 kg/m² as calculated from the following:    Height as of this encounter: 5' 7\" (1.702 m). Weight as of this encounter: 135 lb (61.2 kg). No intake or output data in the 24 hours ending 01/06/23 0957      Physical Exam:   Blood pressure (!) 119/50, pulse 72, temperature 97.9 °F (36.6 °C), temperature source Oral, resp. rate 18, height 5' 7\" (1.702 m), weight 135 lb (61.2 kg), SpO2 93 %. General:    NAD. [place]. Head:  Normocephalic, atraumatic  Eyes:  Sclerae appear normal.  Pupils equally round. ENT:  Nares appear normal.  Moist oral mucosa  Neck:  No restricted ROM. Trachea midline   CV:   RRR. No m/r/g. Lungs: No wheezing. Symmetric expansion. Abdomen:   Soft, nontender, nondistended. Extremities: No cyanosis or clubbing. Skin:     No rashes and normal coloration. Neuro:  CN II-XII grossly intact. Psych:  Normal mood and affect.       I have personally reviewed labs and tests showing:  Recent Labs:  Recent Results (from the past 48 hour(s))   Comprehensive Metabolic Panel    Collection Time: 01/05/23  7:49 PM   Result Value Ref Range    Sodium 137 133 - 143 mmol/L    Potassium 3.8 3.5 - 5.1 mmol/L    Chloride 104 101 - 110 mmol/L    CO2 29 21 - 32 mmol/L    Anion Gap 4 2 - 11 mmol/L    Glucose 121 (H) 65 - 100 mg/dL BUN 15 8 - 23 MG/DL    Creatinine 0.80 0.6 - 1.0 MG/DL    Est, Glom Filt Rate >60 >60 ml/min/1.73m2    Calcium 8.3 8.3 - 10.4 MG/DL    Total Bilirubin 1.8 (H) 0.2 - 1.1 MG/DL    ALT 16 12 - 65 U/L    AST 27 15 - 37 U/L    Alk Phosphatase 101 50 - 136 U/L    Total Protein 7.3 6.3 - 8.2 g/dL    Albumin 3.3 3.2 - 4.6 g/dL    Globulin 4.0 2.8 - 4.5 g/dL    Albumin/Globulin Ratio 0.8 0.4 - 1.6     Protime-INR    Collection Time: 01/05/23  7:49 PM   Result Value Ref Range    Protime 14.2 12.6 - 14.3 sec    INR 1.1     TYPE AND SCREEN    Collection Time: 01/05/23  7:49 PM   Result Value Ref Range    Crossmatch expiration date 01/08/2023,2359     ABO/Rh A POSITIVE     Antibody Screen NEG    CBC with Auto Differential    Collection Time: 01/05/23  7:49 PM   Result Value Ref Range    WBC 12.2 (H) 4.3 - 11.1 K/uL    RBC 3.53 (L) 4.05 - 5.2 M/uL    Hemoglobin 11.2 (L) 11.7 - 15.4 g/dL    Hematocrit 33.8 (L) 35.8 - 46.3 %    MCV 95.8 82 - 102 FL    MCH 31.7 26.1 - 32.9 PG    MCHC 33.1 31.4 - 35.0 g/dL    RDW 14.6 11.9 - 14.6 %    Platelets 726 (L) 716 - 450 K/uL    MPV 11.0 9.4 - 12.3 FL    nRBC 0.00 0.0 - 0.2 K/uL    Differential Type AUTOMATED      Seg Neutrophils 82 (H) 43 - 78 %    Lymphocytes 9 (L) 13 - 44 %    Monocytes 8 4.0 - 12.0 %    Eosinophils % 0 (L) 0.5 - 7.8 %    Basophils 0 0.0 - 2.0 %    Immature Granulocytes 0 0.0 - 5.0 %    Segs Absolute 10.0 (H) 1.7 - 8.2 K/UL    Absolute Lymph # 1.1 0.5 - 4.6 K/UL    Absolute Mono # 1.0 0.1 - 1.3 K/UL    Absolute Eos # 0.0 0.0 - 0.8 K/UL    Basophils Absolute 0.0 0.0 - 0.2 K/UL    Absolute Immature Granulocyte 0.1 0.0 - 0.5 K/UL   Troponin    Collection Time: 01/05/23  7:49 PM   Result Value Ref Range    Troponin, High Sensitivity 17.5 (H) 0 - 14 pg/mL   Urinalysis    Collection Time: 01/06/23  2:24 AM   Result Value Ref Range    Color, UA YELLOW/STRAW      Appearance CLEAR      Specific [place], UA 1.011 1.001 - 1.023      pH, Urine 8.0 5.0 - 9.0      Protein, UA 30 (A) NEG mg/dL    Glucose, UA Negative mg/dL    Ketones, Urine Negative NEG mg/dL    Bilirubin Urine Negative NEG      Blood, Urine LARGE (A) NEG      Urobilinogen, Urine 0.2 0.2 - 1.0 EU/dL    Nitrite, Urine Negative NEG      Leukocyte Esterase, Urine Negative NEG      WBC, UA 0-4 U4 /hpf    RBC, UA >100 (A) U5 /hpf    Epithelial Cells UA 0-5 U5 /hpf    BACTERIA, URINE Negative NEG /hpf    Casts 0-2 U2 /lpf   Basic Metabolic Panel w/ Reflex to MG    Collection Time: 01/06/23  6:25 AM   Result Value Ref Range    Sodium 140 133 - 143 mmol/L    Potassium 4.1 3.5 - 5.1 mmol/L    Chloride 105 101 - 110 mmol/L    CO2 30 21 - 32 mmol/L    Anion Gap 5 2 - 11 mmol/L    Glucose 95 65 - 100 mg/dL    BUN 19 8 - 23 MG/DL    Creatinine 0.70 0.6 - 1.0 MG/DL    Est, Glom Filt Rate >60 >60 ml/min/1.73m2    Calcium 8.8 8.3 - 10.4 MG/DL   CBC with Auto Differential    Collection Time: 01/06/23  6:25 AM   Result Value Ref Range    WBC 8.4 4.3 - 11.1 K/uL    RBC 3.01 (L) 4.05 - 5.2 M/uL    Hemoglobin 9.5 (L) 11.7 - 15.4 g/dL    Hematocrit 29.6 (L) 35.8 - 46.3 %    MCV 98.3 82 - 102 FL    MCH 31.6 26.1 - 32.9 PG    MCHC 32.1 31.4 - 35.0 g/dL    RDW 14.6 11.9 - 14.6 %    Platelets 071 (L) 180 - 450 K/uL    MPV 11.2 9.4 - 12.3 FL    nRBC 0.00 0.0 - 0.2 K/uL    Differential Type AUTOMATED      Seg Neutrophils 79 (H) 43 - 78 %    Lymphocytes 10 (L) 13 - 44 %    Monocytes 9 4.0 - 12.0 %    Eosinophils % 1 0.5 - 7.8 %    Basophils 0 0.0 - 2.0 %    Immature Granulocytes 0 0.0 - 5.0 %    Segs Absolute 6.7 1.7 - 8.2 K/UL    Absolute Lymph # 0.9 0.5 - 4.6 K/UL    Absolute Mono # 0.8 0.1 - 1.3 K/UL    Absolute Eos # 0.1 0.0 - 0.8 K/UL    Basophils Absolute 0.0 0.0 - 0.2 K/UL    Absolute Immature Granulocyte 0.0 0.0 - 0.5 K/UL       I have personally reviewed imaging studies showing: Other Studies:  XR HIP RIGHT (2-3 VIEWS)   Final Result   1. Periprosthetic fracture of the right proximal femur as described above.       2. No acute osseous abnormality of the left hip. XR HIP LEFT (2-3 VIEWS)   Final Result   1. Periprosthetic fracture of the right proximal femur as described above. 2. No acute osseous abnormality of the left hip. XR CHEST 1 VIEW   Final Result   1. Stable moderate cardiomegaly without evolving acute changes evident by plain   film. CT CSpine W/O Contrast   Final Result   1. No acute intracranial process evident by noncontrast CT study of the head. 2. No acute osseous abnormality the bony calvarium, skull base, or cervical   spine. 3 1.5 cm x 0.9 cm right upper lobe nodule. A benign process cannot be confirmed   and long-term stability cannot be demonstrated due to the lack of prior   comparison studies at this institution. A pulmonary or pleural would be   recommended for further assessment and potential management. By imaging, this   would be best initially assessed with a preferably contrasted CT scan of the   chest.      CT Head W/O Contrast   Final Result   1. No acute intracranial process evident by noncontrast CT study of the head. 2. No acute osseous abnormality the bony calvarium, skull base, or cervical   spine. 3 1.5 cm x 0.9 cm right upper lobe nodule. A benign process cannot be confirmed   and long-term stability cannot be demonstrated due to the lack of prior   comparison studies at this institution. A pulmonary or pleural would be   recommended for further assessment and potential management.  By imaging, this   would be best initially assessed with a preferably contrasted CT scan of the   chest.          Current Meds:  Current Facility-Administered Medications   Medication Dose Route Frequency    [START ON 1/7/2023] enoxaparin Sodium (LOVENOX) injection 30 mg  30 mg SubCUTAneous Daily    escitalopram (LEXAPRO) tablet 10 mg  10 mg Oral Daily    lisinopril (PRINIVIL;ZESTRIL) tablet 20 mg  20 mg Oral Daily    sodium chloride flush 0.9 % injection 5-40 mL  5-40 mL IntraVENous 2 times per day sodium chloride flush 0.9 % injection 5-40 mL  5-40 mL IntraVENous PRN    0.9 % sodium chloride infusion   IntraVENous PRN    ondansetron (ZOFRAN-ODT) disintegrating tablet 4 mg  4 mg Oral Q8H PRN    Or    ondansetron (ZOFRAN) injection 4 mg  4 mg IntraVENous Q6H PRN    polyethylene glycol (GLYCOLAX) packet 17 g  17 g Oral Daily PRN    acetaminophen (TYLENOL) tablet 650 mg  650 mg Oral Q6H PRN    Or    acetaminophen (TYLENOL) suppository 650 mg  650 mg Rectal Q6H PRN    tuberculin injection 5 Units  5 Units IntraDERmal Once     Current Outpatient Medications   Medication Sig    escitalopram (LEXAPRO) 10 MG tablet Take 1 tablet by mouth daily    lisinopril (PRINIVIL;ZESTRIL) 20 MG tablet Take 1 tablet by mouth daily    meloxicam (MOBIC) 7.5 MG tablet Take 1-2 tablets by mouth daily as needed for Pain       Signed:  Chanell Orta, APRN - CNP    Part of this note may have been written by using a voice dictation software. The note has been proof read but may still contain some grammatical/other typographical errors.

## 2023-01-07 PROCEDURE — 97166 OT EVAL MOD COMPLEX 45 MIN: CPT

## 2023-01-07 PROCEDURE — 97162 PT EVAL MOD COMPLEX 30 MIN: CPT

## 2023-01-07 PROCEDURE — 6360000002 HC RX W HCPCS: Performed by: PHYSICIAN ASSISTANT

## 2023-01-07 PROCEDURE — 2580000003 HC RX 258: Performed by: FAMILY MEDICINE

## 2023-01-07 PROCEDURE — 2500000003 HC RX 250 WO HCPCS: Performed by: FAMILY MEDICINE

## 2023-01-07 PROCEDURE — 97530 THERAPEUTIC ACTIVITIES: CPT

## 2023-01-07 PROCEDURE — 1100000000 HC RM PRIVATE

## 2023-01-07 PROCEDURE — 51798 US URINE CAPACITY MEASURE: CPT

## 2023-01-07 PROCEDURE — 6370000000 HC RX 637 (ALT 250 FOR IP): Performed by: FAMILY MEDICINE

## 2023-01-07 PROCEDURE — 97535 SELF CARE MNGMENT TRAINING: CPT

## 2023-01-07 RX ADMIN — ESCITALOPRAM OXALATE 10 MG: 10 TABLET ORAL at 09:38

## 2023-01-07 RX ADMIN — TUBERCULIN PURIFIED PROTEIN DERIVATIVE 5 UNITS: 5 INJECTION, SOLUTION INTRADERMAL at 14:01

## 2023-01-07 RX ADMIN — LISINOPRIL 20 MG: 20 TABLET ORAL at 09:38

## 2023-01-07 RX ADMIN — ENOXAPARIN SODIUM 30 MG: 100 INJECTION SUBCUTANEOUS at 09:39

## 2023-01-07 RX ADMIN — SODIUM CHLORIDE, PRESERVATIVE FREE 5 ML: 5 INJECTION INTRAVENOUS at 22:00

## 2023-01-07 RX ADMIN — SODIUM CHLORIDE, PRESERVATIVE FREE 10 ML: 5 INJECTION INTRAVENOUS at 09:39

## 2023-01-07 ASSESSMENT — PAIN SCALES - PAIN ASSESSMENT IN ADVANCED DEMENTIA (PAINAD)
BREATHING: 0
NEGVOCALIZATION: 0
BODYLANGUAGE: 0
CONSOLABILITY: 0
TOTALSCORE: 1
FACIALEXPRESSION: 1

## 2023-01-07 ASSESSMENT — PAIN SCALES - GENERAL
PAINLEVEL_OUTOF10: 1
PAINLEVEL_OUTOF10: 0

## 2023-01-07 NOTE — PROGRESS NOTES
ACUTE PHYSICAL THERAPY GOALS:   (Developed with and agreed upon by patient and/or caregiver.)    (1.)Ms. Chad Horton will move from supine to sit and sit to supine , scoot up and down, and roll side to side in bed with SUPERVISION within 7 treatment day(s). (2.)Ms. Chad Horton will transfer from bed to chair and chair to bed with MINIMAL ASSIST using the least restrictive device within 7 treatment day(s). (3.)Ms. Chad Horton will ambulate with MINIMAL ASSIST for 50 feet with the least restrictive device within 7 treatment day(s). ________________________________________________________________________________________________     PHYSICAL THERAPY Initial Assessment and Daily Note  (Link to Caseload Tracking: PT Visit Days : 1  Acknowledge Orders  Time In/Out  PT Charge Capture  Rehab Caseload Tracker    Gifty Casillas is a Hanna Melba [age] y.o. female   PRIMARY DIAGNOSIS: Periprosthetic hip fracture, initial encounter  Fall, initial encounter [W19. XXXA]  [facility] right [facility] [S72.001A]  Periprosthetic hip fracture, initial encounter [X80. Jhonny Guerra       Reason for Referral: Pain in Right Hip (M25.551)  Stiffness of Right Hip, Not elsewhere classified (M25.651)  Generalized Muscle Weakness (M62.81)  Difficulty in walking, Not elsewhere classified (R26.2)  Other abnormalities of gait and mobility (R26.89)  History of falling (Z91.81)  Inpatient: Payor: MEDICARE / Plan: MEDICARE PART A AND B / Product Type: *No Product type* /     ASSESSMENT:     REHAB RECOMMENDATIONS:   Recommendation to date pending progress:  Setting:  Short-term Rehab    Equipment:    To Be Determined     ASSESSMENT:  Ms. Chad Horton presents s/p fall with a non-op R hip fx and is TDWB. She lives alone at baseline and uses a cane for ambulation, reports independence with ADLs and has family nearby for assist. Pt with decreased short term memory and asks the same question several times throughout the session.  Pt's niece is present and assists with history as the pt is also [place]. She is able to perform bed mobility with mod A x2 and came to stand using the RW with VC and mod A x2. Pt is not able to maintain WB precautions throughout despite verbal and tactile cues. She demonstrates decreased gait, transfers, strength and overall functional mobility and is limited by intermittent pain. Pt will continue to benefit from skilled acute care PT to facilitate improvements in the above stated deficits and promote return to baseline. STR recommended at DC.       [address]\"" [address] [address] AM-PAC 6 Clicks Basic Mobility Inpatient Short Form  AM-PAC Mobility Inpatient   How much difficulty turning over in bed?: A Lot  How much difficulty sitting down on / standing up from a chair with arms?: A Lot  How much difficulty moving from lying on back to sitting on side of bed?: A Lot  How much help from another person moving to and from a bed to a chair?: A Lot  How much help from another person needed to walk in hospital room?: Total  How much help from another person for climbing 3-5 steps with a railing?: Total  AM-PAC Inpatient Mobility Raw Score : 10  AM-PAC Inpatient T-Scale Score : 32.29  Mobility Inpatient CMS 0-100% Score: 76.75  Mobility Inpatient CMS G-Code Modifier : CL    SUBJECTIVE:   Ms. Shantelle Damon states, \"You can call me Sis\"     Social/Functional Lives With: Alone  Type of Home: House  Home Layout: One level  Home Equipment: [employer]  Has the patient had two or more falls in the past year or any fall with injury in the past year?: Yes  Receives Help From: Family  ADL Assistance: Independent  Ambulation Assistance: Independent  Transfer Assistance: Independent    OBJECTIVE:     PAIN: VITALS / O2: Elissa Hinds / Jojo Pan / Cookie Sarah:   Pre Treatment:   Pain Assessment: None - Denies Pain      Post Treatment: 0/10 Vitals        Oxygen      None    RESTRICTIONS/PRECAUTIONS:  Restrictions/Precautions: Weight Bearing  Right Lower Extremity Weight Bearing: Toe Touch Weight Bearing              GROSS EVALUATION: Intact Impaired (Comments):   AROM []  Decreased in RLE due to increased pain   PROM []    Strength []  Generalized weakness demonstrated with functional mobility   Balance [] Posture: Fair  Sitting - Static: Fair, +  Sitting - Dynamic: Fair, +  Standing - Static: Fair, -  Standing - Dynamic: Poor   Posture [] Forward Head  Rounded Shoulders  Thoracic Kyphosis  Trunk Flexion   Sensation []  Decreased in feet   Coordination []      Tone []     Edema []    Activity Tolerance [] Patient limited by fatigue, Treatment limited secondary to decreased cognition    []      COGNITION/  PERCEPTION: Intact Impaired (Comments):   Orientation []  Oriented to person   Vision []     Hearing []  [place]   Cognition  []  Poor short term memory, asks the same question several times     MOBILITY: I Mod I S SBA CGA Min Mod Max Total  NT x2 Comments:   Bed Mobility    Rolling [] [] [] [] [] [] [] [] [] [x] []    Supine to Sit [] [] [] [] [] [] [x] [] [] [] [x]    Scooting [] [] [] [] [] [] [x] [] [] [] [x]    Sit to Supine [] [] [] [] [] [] [] [] [] [x] []    Transfers    Sit to Stand [] [] [] [] [] [] [x] [] [] [] [x]    Bed to Chair [] [] [] [] [] [] [x] [] [] [] [x]    Stand to Sit [] [] [] [] [] [] [x] [] [] [] [x]     [] [] [] [] [] [] [] [] [] [] []    I=Independent, Mod I=Modified Independent, S=Supervision, SBA=Standby Assistance, CGA=Contact Guard Assistance,   Min=Minimal Assistance, Mod=Moderate Assistance, Max=Maximal Assistance, Total=Total Assistance, NT=Not Tested    GAIT: I Mod I S SBA CGA Min Mod Max Total  NT x2 Comments:   Level of Assistance [] [] [] [] [] [] [] [] [] [x] []    Distance   feet    DME N/A    Gait Quality N/A    Weightbearing Status Restrictions/Precautions  Restrictions/Precautions: Weight Bearing  Lower Extremity Weight Bearing Restrictions  Right Lower Extremity Weight Bearing: Toe Touch Weight Bearing    Stairs      I=Independent, Mod I=Modified Independent, S=Supervision, SBA=Standby Assistance, CGA=Contact Guard Assistance,   Min=Minimal Assistance, Mod=Moderate Assistance, Max=Maximal Assistance, Total=Total Assistance, NT=Not Tested    PLAN:   FREQUENCY AND DURATION: Daily for duration of hospital stay or until stated goals are met, whichever comes first.    THERAPY PROGNOSIS: Fair    PROBLEM LIST:   (Skilled intervention is medically necessary to address:)  Decreased ADL/Functional Activities  Decreased Activity Tolerance  Decreased AROM/PROM  Decreased Balance  Decreased Cognition  Decreased Coordination  Decreased Gait Ability  Decreased Safety Awareness  Decreased Strength  Decreased Transfer Abilities  Increased Pain INTERVENTIONS PLANNED:   (Benefits and precautions of physical therapy have been discussed with the patient.)  Self Care Training  Therapeutic Activity  Therapeutic Exercise/HEP  Neuromuscular Re-education  Gait Training       TREATMENT:   EVALUATION: MODERATE COMPLEXITY: (Untimed Charge)    TREATMENT:   Co-Treatment PT/OT necessary due to patient's decreased overall endurance/tolerance levels, as well as need for high level skilled assistance to complete functional transfers/mobility and functional tasks  Therapeutic Activity (18 Minutes): Therapeutic activity included Rolling, Supine to Sit, Sit to Supine, Scooting, Transfer Training, Sitting balance , and Standing balance to improve functional Activity tolerance, Balance, Coordination, and Mobility.     TREATMENT GRID:  N/A    AFTER TREATMENT PRECAUTIONS: Alarm Activated, Bed/Chair Locked, Call light within reach, Chair, RN notified, and Visitors at bedside    INTERDISCIPLINARY COLLABORATION:  RN/ PCT, PT/ PTA, and OT/ MORGAN    EDUCATION: Education Given To: Family  Education Provided: Role of Therapy;Plan of Care  Education Method: Verbal  Barriers to Learning: None  Education Outcome: Verbalized understanding    TIME IN/OUT:  Time In: 0836  Time Out: 3225  Minutes: 809 [address]

## 2023-01-07 NOTE — PROGRESS NOTES
Hospitalist Progress Note   Admit Date:  2023  4:59 PM   Name:  Stacy Mcginnis   Age:  80 y.o. Sex:  female  :  1922   MRN:  [id number]   Room:  [facility]/    Presenting Complaint: Fall     Reason(s) for Admission: Fall, initial encounter [W19. XXXA]  Closed right hip fracture, initial encounter ([facility] [S72.001A]  Periprosthetic hip fracture, initial encounter [O52. Tia Chakraborty Course:   Stacy Mcginnis is a 80 y.o. female with a PMH of hypertension, dementia, anxiety who presented to the ER after an unwitnessed fall. Patient lives at home by herself. Found by sister-in-law. Patient does not remember what happened. Denies loss of consciousness. Patient complaining of hip pain since fall. No other complaint. Not on any blood thinner. In the ER, patient was vitally stable. Labs notable for mild leukocytosis, otherwise unremarkable. XR hip showed periprosthetic fracture of the right proximal femur. CT head and CT cervical spine negative for fracture. Hospitalist consulted for admission and Ortho surgery was consulted. They will treat non-operatively. The patient will need rehab at discharge. Subjective & 24hr Events (23): The patient is actively working with therapy this morning. She is afebrile and saturating well on room air. PT/OT recommend STR. Assessment & Plan:     Acute periprosthetic fracture of right proximal femur  Plain films reviewed  Ortho consulted and will treat non-operatively  PT/OT recommends STR  Pain control     Leukocytosis  Likely reactive, now resolved  UA negative  CXR without acute pathology     Hypertension  Continue lisinopril  PRN hydralazine    Mood disorder  Continue Lexapro      Anticipated discharge needs:  Short-term rehab       Diet:  ADULT DIET;  Regular  DVT PPx: enoxaparin  Code status: DNR    Hospital Problems:  Principal Problem:    Periprosthetic hip fracture, initial encounter  Resolved Problems:    * No resolved hospital problems. *      Objective:   Patient Vitals for the past 24 hrs:   Temp Pulse Resp BP SpO2   01/07/23 1112 97.7 °F (36.5 °C) 73 18 102/77 97 %   01/07/23 0727 97.9 °F (36.6 °C) 70 18 (!) 128/45 90 %   01/07/23 0450 97.9 °F (36.6 °C) 82 17 130/72 96 %   01/07/23 0015 98 °F (36.7 °C) 88 18 (!) 127/57 95 %   01/06/23 2312 -- -- 18 -- --   01/06/23 2000 98 °F (36.7 °C) 72 20 132/68 94 %   01/06/23 1630 -- 81 18 118/64 93 %   01/06/23 1510 -- 80 16 (!) 141/71 93 %   01/06/23 1458 -- -- -- (!) 163/66 --   01/06/23 1416 -- 74 24 (!) 148/66 93 %   01/06/23 1345 -- 73 14 (!) 142/60 90 %         Oxygen Therapy  SpO2: 97 %  Pulse Oximeter Device Mode: Continuous  O2 Device: None (Room air)    Estimated body mass index is 21.14 kg/m² as calculated from the following:    Height as of this encounter: 5' 7\" (1.702 m). Weight as of this encounter: 135 lb (61.2 kg). No intake or output data in the 24 hours ending 01/07/23 1253      Physical Exam:   Blood pressure 102/77, pulse 73, temperature 97.7 °F (36.5 °C), temperature source Oral, resp. rate 18, height 5' 7\" (1.702 m), weight 135 lb (61.2 kg), SpO2 97 %. General:    NAD. [place]. Head:  Normocephalic, atraumatic  Eyes:  Sclerae appear normal.  Pupils equally round. ENT:  Nares appear normal.  Moist oral mucosa  Neck:  No restricted ROM. Trachea midline   CV:   RRR. No m/r/g. Lungs: No wheezing. Symmetric expansion. Abdomen:   Soft, nontender, nondistended. Extremities: No cyanosis or clubbing. Skin:     No rashes and normal coloration. Neuro:  CN II-XII grossly intact. Psych:  Normal mood and affect.       I have personally reviewed labs and tests showing:  Recent Labs:  Recent Results (from the past 48 hour(s))   Comprehensive Metabolic Panel    Collection Time: 01/05/23  7:49 PM   Result Value Ref Range    Sodium 137 133 - 143 mmol/L    Potassium 3.8 3.5 - 5.1 mmol/L    Chloride 104 101 - 110 mmol/L    CO2 29 21 - 32 mmol/L    Anion Gap 4 2 - 11 mmol/L    Glucose 121 (H) 65 - 100 mg/dL    BUN 15 8 - 23 MG/DL    Creatinine 0.80 0.6 - 1.0 MG/DL    Est, Glom Filt Rate >60 >60 ml/min/1.73m2    Calcium 8.3 8.3 - 10.4 MG/DL    Total Bilirubin 1.8 (H) 0.2 - 1.1 MG/DL    ALT 16 12 - 65 U/L    AST 27 15 - 37 U/L    Alk Phosphatase 101 50 - 136 U/L    Total Protein 7.3 6.3 - 8.2 g/dL    Albumin 3.3 3.2 - 4.6 g/dL    Globulin 4.0 2.8 - 4.5 g/dL    Albumin/Globulin Ratio 0.8 0.4 - 1.6     Protime-INR    Collection Time: 01/05/23  7:49 PM   Result Value Ref Range    Protime 14.2 12.6 - 14.3 sec    INR 1.1     TYPE AND SCREEN    Collection Time: 01/05/23  7:49 PM   Result Value Ref Range    Crossmatch expiration date 01/08/2023,2359     ABO/Rh A POSITIVE     Antibody Screen NEG    CBC with Auto Differential    Collection Time: 01/05/23  7:49 PM   Result Value Ref Range    WBC 12.2 (H) 4.3 - 11.1 K/uL    RBC 3.53 (L) 4.05 - 5.2 M/uL    Hemoglobin 11.2 (L) 11.7 - 15.4 g/dL    Hematocrit 33.8 (L) 35.8 - 46.3 %    MCV 95.8 82 - 102 FL    MCH 31.7 26.1 - 32.9 PG    MCHC 33.1 31.4 - 35.0 g/dL    RDW 14.6 11.9 - 14.6 %    Platelets 136 (L) 375 - 450 K/uL    MPV 11.0 9.4 - 12.3 FL    nRBC 0.00 0.0 - 0.2 K/uL    Differential Type AUTOMATED      Seg Neutrophils 82 (H) 43 - 78 %    Lymphocytes 9 (L) 13 - 44 %    Monocytes 8 4.0 - 12.0 %    Eosinophils % 0 (L) 0.5 - 7.8 %    Basophils 0 0.0 - 2.0 %    Immature Granulocytes 0 0.0 - 5.0 %    Segs Absolute 10.0 (H) 1.7 - 8.2 K/UL    Absolute Lymph # 1.1 0.5 - 4.6 K/UL    Absolute Mono # 1.0 0.1 - 1.3 K/UL    Absolute Eos # 0.0 0.0 - 0.8 K/UL    Basophils Absolute 0.0 0.0 - 0.2 K/UL    Absolute Immature Granulocyte 0.1 0.0 - 0.5 K/UL   Troponin    Collection Time: 01/05/23  7:49 PM   Result Value Ref Range    Troponin, High Sensitivity 17.5 (H) 0 - 14 pg/mL   Urinalysis    Collection Time: 01/06/23  2:24 AM   Result Value Ref Range    Color, UA YELLOW/STRAW      Appearance CLEAR      Specific [place], UA 1.011 1.001 - 1.023      pH, Urine 8.0 5.0 - 9.0      Protein, UA 30 (A) NEG mg/dL    Glucose, UA Negative mg/dL    Ketones, Urine Negative NEG mg/dL    Bilirubin Urine Negative NEG      Blood, Urine LARGE (A) NEG      Urobilinogen, Urine 0.2 0.2 - 1.0 EU/dL    Nitrite, Urine Negative NEG      Leukocyte Esterase, Urine Negative NEG      WBC, UA 0-4 U4 /hpf    RBC, UA >100 (A) U5 /hpf    Epithelial Cells UA 0-5 U5 /hpf    BACTERIA, URINE Negative NEG /hpf    Casts 0-2 U2 /lpf   Culture, Urine    Collection Time: 01/06/23  2:24 AM    Specimen: Urine, clean catch    URINE   Result Value Ref Range    Special Requests NO SPECIAL REQUESTS      Culture        10,000 to 50,000 COLONIES/mL MIXED SKIN LOIDA ISOLATED   Basic Metabolic Panel w/ Reflex to MG    Collection Time: 01/06/23  6:25 AM   Result Value Ref Range    Sodium 140 133 - 143 mmol/L    Potassium 4.1 3.5 - 5.1 mmol/L    Chloride 105 101 - 110 mmol/L    CO2 30 21 - 32 mmol/L    Anion Gap 5 2 - 11 mmol/L    Glucose 95 65 - 100 mg/dL    BUN 19 8 - 23 MG/DL    Creatinine 0.70 0.6 - 1.0 MG/DL    Est, Glom Filt Rate >60 >60 ml/min/1.73m2    Calcium 8.8 8.3 - 10.4 MG/DL   CBC with Auto Differential    Collection Time: 01/06/23  6:25 AM   Result Value Ref Range    WBC 8.4 4.3 - 11.1 K/uL    RBC 3.01 (L) 4.05 - 5.2 M/uL    Hemoglobin 9.5 (L) 11.7 - 15.4 g/dL    Hematocrit 29.6 (L) 35.8 - 46.3 %    MCV 98.3 82 - 102 FL    MCH 31.6 26.1 - 32.9 PG    MCHC 32.1 31.4 - 35.0 g/dL    RDW 14.6 11.9 - 14.6 %    Platelets 524 (L) 476 - 450 K/uL    MPV 11.2 9.4 - 12.3 FL    nRBC 0.00 0.0 - 0.2 K/uL    Differential Type AUTOMATED      Seg Neutrophils 79 (H) 43 - 78 %    Lymphocytes 10 (L) 13 - 44 %    Monocytes 9 4.0 - 12.0 %    Eosinophils % 1 0.5 - 7.8 %    Basophils 0 0.0 - 2.0 %    Immature Granulocytes 0 0.0 - 5.0 %    Segs Absolute 6.7 1.7 - 8.2 K/UL    Absolute Lymph # 0.9 0.5 - 4.6 K/UL    Absolute Mono # 0.8 0.1 - 1.3 K/UL    Absolute Eos # 0.1 0.0 - 0.8 K/UL    Basophils Absolute 0.0 0.0 - 0.2 K/UL Absolute Immature Granulocyte 0.0 0.0 - 0.5 K/UL   POCT Glucose    Collection Time: 01/06/23  8:37 PM   Result Value Ref Range    POC Glucose 208 (H) 65 - 100 mg/dL    Performed by: Daquan SOOD have personally reviewed imaging studies showing: Other Studies:  XR HIP RIGHT (2-3 VIEWS)   Final Result   1. Periprosthetic fracture of the right proximal femur as described above. 2. No acute osseous abnormality of the left hip. XR HIP LEFT (2-3 VIEWS)   Final Result   1. Periprosthetic fracture of the right proximal femur as described above. 2. No acute osseous abnormality of the left hip. XR CHEST 1 VIEW   Final Result   1. Stable moderate cardiomegaly without evolving acute changes evident by plain   film. CT CSpine W/O Contrast   Final Result   1. No acute intracranial process evident by noncontrast CT study of the head. 2. No acute osseous abnormality the bony calvarium, skull base, or cervical   spine. 3 1.5 cm x 0.9 cm right upper lobe nodule. A benign process cannot be confirmed   and long-term stability cannot be demonstrated due to the lack of prior   comparison studies at this institution. A pulmonary or pleural would be   recommended for further assessment and potential management. By imaging, this   would be best initially assessed with a preferably contrasted CT scan of the   chest.      CT Head W/O Contrast   Final Result   1. No acute intracranial process evident by noncontrast CT study of the head. 2. No acute osseous abnormality the bony calvarium, skull base, or cervical   spine. 3 1.5 cm x 0.9 cm right upper lobe nodule. A benign process cannot be confirmed   and long-term stability cannot be demonstrated due to the lack of prior   comparison studies at this institution. A pulmonary or pleural would be   recommended for further assessment and potential management.  By imaging, this   would be best initially assessed with a preferably contrasted CT scan of the   chest.          Current Meds:  Current Facility-Administered Medications   Medication Dose Route Frequency    enoxaparin Sodium (LOVENOX) injection 30 mg  30 mg SubCUTAneous Daily    traMADol (ULTRAM) tablet 50 mg  50 mg Oral Q6H PRN    hydrALAZINE (APRESOLINE) injection 10 mg  10 mg IntraVENous Q6H PRN    escitalopram (LEXAPRO) tablet 10 mg  10 mg Oral Daily    lisinopril (PRINIVIL;ZESTRIL) tablet 20 mg  20 mg Oral Daily    sodium chloride flush 0.9 % injection 5-40 mL  5-40 mL IntraVENous 2 times per day    sodium chloride flush 0.9 % injection 5-40 mL  5-40 mL IntraVENous PRN    0.9 % sodium chloride infusion   IntraVENous PRN    ondansetron (ZOFRAN-ODT) disintegrating tablet 4 mg  4 mg Oral Q8H PRN    Or    ondansetron (ZOFRAN) injection 4 mg  4 mg IntraVENous Q6H PRN    polyethylene glycol (GLYCOLAX) packet 17 g  17 g Oral Daily PRN    acetaminophen (TYLENOL) tablet 650 mg  650 mg Oral Q6H PRN    Or    acetaminophen (TYLENOL) suppository 650 mg  650 mg Rectal Q6H PRN    tuberculin injection 5 Units  5 Units IntraDERmal Once       Signed:  KUMAR Jarquin - CNP    Part of this note may have been written by using a voice dictation software. The note has been proof read but may still contain some grammatical/other typographical errors.

## 2023-01-07 NOTE — PROGRESS NOTES
ACUTE OCCUPATIONAL THERAPY GOALS:   (Developed with and agreed upon by patient and/or caregiver.)  1. Patient will maintain TTWB status in RLE for 100% of treatment session and MN verbal cues from therapist.   2. Patient will complete functional transfers with MIN A while maintaining TTWB status in RLE and with adaptive equipment as needed. 3. Patient will complete lower body bathing and dressing with MIN A and adaptive equipment as needed. 4. Patient will complete toileting and toilet transfer with MIN A.   5. Patient will tolerate 25 minutes of OT treatment with 1-2 rest breaks to increase activity tolerance for ADLs. 6. Patient will participate in at least 10 minutes of BUE therapeutic exercises to strengthen BUE for functional transfers. Timeframe: 7 visits      OCCUPATIONAL THERAPY Initial Assessment and Daily Note       OT Visit Days: 1  Acknowledge Orders  Time  OT Charge Capture  Rehab Caseload Tracker      TTWB RLE, no hip abduction    Britney Parrish is a 80 y.o. female   PRIMARY DIAGNOSIS: Periprosthetic hip fracture, initial encounter  Fall, initial encounter [W19. XXXA]  Closed right hip fracture, initial encounter ([facility] [address] [S72.001A]  Periprosthetic hip fracture, initial encounter [H21. Gerarda Griffith       Reason for Referral: Pain in Right Hip (M25.551)  Stiffness of Right Hip, Not elsewhere classified (M25.651)  Difficulty in walking, Not elsewhere classified (R26.2)  Other abnormalities of gait and mobility (R26.89)  History of falling (Z91.81)  Inpatient: Payor: MEDICARE / Plan: MEDICARE PART A AND B / Product Type: *No Product type* /     ASSESSMENT:     REHAB RECOMMENDATIONS:   Recommendation to date pending progress:  Setting:  Short-term Rehab    Equipment:    To Be Determined--pt has SPC at home     ASSESSMENT:  Ms. Sandoval Grandmariah is a 81 y/o female presents with R hip fracture after a fall at home that has been deemed non-operative per ortho. Pt to be TTWB RLE.  At baseline pt lives alone, is independent with ADLs and uses [facility] for ambulation. Pt sister in law (who is 80) checks on pt daily. Pt with poor memory, decreased safety awareness and maintenance of WB precautions. Today pt presents with decreased activity tolerance, balance strength and cognition impacting ADLs. Pt overall mod A x2 for bed mobility, sit<>stand RW and max A x2 for bed>chair transfer. Pt required max verbal and physical cueing to move BLE during transfer and to maintain WB precautions. Pt mod A for janeth care in standing and  max A for brief change. Pt is currently functioning below baseline and would benefit from skilled OT services to address OT goals and plan of care. Rec STR at d/c. [address] [facility] [address] [address] AM-PAC 6 Clicks Daily Activity Inpatient Short Form:    AM-PAC Daily Activity Inpatient   How much help for putting on and taking off regular lower body clothing?: A Lot  How much help for Bathing?: A Lot  How much help for Toileting?: A Lot  How much help for putting on and taking off regular upper body clothing?: A Little  How much help for taking care of personal grooming?: A Little  How much help for eating meals?: A Little  AM-[facility] Inpatient Daily Activity Raw Score: 15  AM-PAC Inpatient ADL T-Scale Score : 34.69  ADL Inpatient CMS 0-100% Score: 56.46  ADL Inpatient CMS G-Code Modifier : CK           SUBJECTIVE:     Ms. Sandoval Grandchild states, \"How did I get here? \" Pt asked orientation questions repeatedly throughout session     Social/Functional Lives With: Alone  Type of Home: House  Home Layout: One level  ADL Assistance: Independent    OBJECTIVE:     Namita Mann / Jackelin Sagastume / Nba Baltazar: Jada Edge    RESTRICTIONS/PRECAUTIONS:  Restrictions/Precautions: Weight Bearing  Right Lower Extremity Weight Bearing: Toe Touch Weight Bearing    PAIN: VITALS / O2:   Pre Treatment:   Pain Assessment: None - Denies Pain      Post Treatment: no c/o pain, resting comfortably in bedside chair       Vitals          Oxygen            GROSS EVALUATION: INTACT IMPAIRED   (See Comments)   UE AROM [x] []   UE PROM [x] []   Strength []  Generally weak but functional     Posture / Balance [] Posture: Fair  Sitting - Static: Fair, +  Sitting - Dynamic: Fair, +  Standing - Static: Fair, -  Standing - Dynamic: Poor   Sensation [x]     Coordination [x]       Tone [x]       Edema [x]    Activity Tolerance [] Patient limited by fatigue, Treatment limited secondary to decreased cognition     Hand Dominance R [] L []      COGNITION/  PERCEPTION: INTACT IMPAIRED   (See Comments)   Orientation []  AAO x1 to self   Vision [x]     Hearing []  Very Lummi   Cognition  []  Decreased safety awareness and insight into deficits   Perception []  Cues for initiation and motor planning      MOBILITY: I Mod I S SBA CGA Min Mod Max Total  NT x2 Comments:   Bed Mobility    Rolling [] [] [] [] [] [] [] [] [] [x] []    Supine to Sit [] [] [] [] [] [] [x] [] [] [] [x]    Scooting [] [] [] [] [] [] [x] [] [] [] [x]    Sit to Supine [] [] [] [] [] [] [] [] [] [x] []    Transfers    Sit to Stand [] [] [] [] [] [] [x] [] [] [] [x] RW   Bed to Chair [] [] [] [] [] [] [] [x] [] [] [x] RW   Stand to Sit [] [] [] [] [] [] [x] [] [] [] [x] RW   Tub/Shower [] [] [] [] [] [] [] [] [] [x] []     Toilet [] [] [] [] [] [] [] [] [] [x] []      [] [] [] [] [] [] [] [] [] [x] []    I=Independent, Mod I=Modified Independent, S=Supervision/Setup, SBA=Standby Assistance, CGA=Contact Guard Assistance, Min=Minimal Assistance, Mod=Moderate Assistance, Max=Maximal Assistance, Total=Total Assistance, NT=Not Tested    ACTIVITIES OF DAILY LIVING: I Mod I S SBA CGA Min Mod Max Total NT Comments   BASIC ADLs:              Upper Body Bathing  [] [] [] [] [] [] [] [] [] [x]    Lower Body Bathing [] [] [] [] [] [] [] [] [] [x]    Toileting [] [] [] [] [] [] [x] [] [] [] For janeth care in standing   Upper Body Dressing [] [] [] [] [] [] [] [] [] [x]    Lower Body Dressing [] [] [] [] [] [] [] [x] [] [] Donning/doffing new brief standing RW and socks EOB   Feeding [] [] [] [] [] [x] [] [] [] [] Opening containers and cutting up food   Grooming [] [] [] [] [] [] [] [] [] [x]    Personal Device Care [] [] [] [] [] [] [] [] [] [x]    Functional Mobility [] [] [] [] [] [] [x] [x] [] [] X2 RW   I=Independent, Mod I=Modified Independent, S=Supervision/Setup, SBA=Standby Assistance, CGA=Contact Guard Assistance, Min=Minimal Assistance, Mod=Moderate Assistance, Max=Maximal Assistance, Total=Total Assistance, NT=Not Tested    PLAN:   [address] [place] [address] of Care: 5 times/week for duration of hospital stay or until stated goals are met, whichever comes first.    PROBLEM LIST:   (Skilled intervention is medically necessary to address:)  Decreased ADL/Functional Activities  Decreased Activity Tolerance  Decreased Balance  Decreased Cognition  Decreased Coordination  Decreased Gait Ability  Decreased Safety Awareness  Decreased Strength  Decreased Transfer Abilities   INTERVENTIONS PLANNED:  (Benefits and precautions of occupational therapy have been discussed with the patient.)  Self Care Training  Therapeutic Activity  Therapeutic Exercise/HEP  Neuromuscular Re-education  Manual Therapy  Education         TREATMENT:     EVALUATION: MODERATE COMPLEXITY: (Untimed Charge)    TREATMENT:   Co-Treatment PT/OT necessary due to patient's decreased overall endurance/tolerance levels, as well as need for high level skilled assistance to complete functional transfers/mobility and functional tasks  Self Care (15 minutes): Patient participated in toileting, lower body dressing, and self feeding ADLs in unsupported sitting and standing with maximal verbal, manual, and tactile cueing to increase independence, decrease assistance required, increase activity tolerance, increase safety awareness, and maintain precautions.  Patient also participated in functional mobility, functional transfer, and adaptive equipment training to increase independence, decrease assistance required, increase activity tolerance, increase safety awareness, and maintain precautions. TREATMENT GRID:  N/A    AFTER TREATMENT PRECAUTIONS: Alarm Activated, Call light within reach, Chair, Needs within reach, RN notified, and Visitors at bedside    INTERDISCIPLINARY COLLABORATION:  RN/ PCT, PT/ PTA, and OT/ MORGAN    EDUCATION:  Education Given To: Patient; Family  Education Provided: Role of Therapy;Plan of Care;Precautions; ADL Adaptive Strategies;Transfer Training  Education Method: Verbal  Barriers to Learning: None;Hearing;Cognition  Education Outcome: Verbalized understanding;Continued education needed    TOTAL TREATMENT DURATION AND TIME:  Time In: [facility]  Time Out: 3196  Minutes: [address], OT

## 2023-01-07 NOTE — PROGRESS NOTES
Physician Progress Note      PATIENT:               Gabriela Quinones  CSN #:                  [id number]  :                       1922  ADMIT DATE:       2023 4:59 PM  [address] [place] DATE:  RESPONDING  PROVIDER #:        Laquita Frey MD          QUERY TEXT:    Pt admitted with fall and noted to have closed right proximal femur fracture. If possible, please document in progress notes and discharge summary if you   are evaluating and/or treating any of the following: The medical record reflects the following:  Risk Factors: Osteopenia, age, female,  Clinical Indicators: Here with closed right periprosthetic greater trochanter   fracture after fall in bathroom. Hx of osteopenia. Treatment: Ortho consult, Hip X-ray, PT/OT  Options provided:  -- Pathological right proximal femur fracture due to osteopenia following fall   which would not usually break a normal, healthy bone  -- Osteoporotic right proximal femur fracture following fall which would not   usually break a normal, healthy bone  -- Traumatic right proximal femur  -- Other - I will add my own diagnosis  -- Disagree - Not applicable / Not valid  -- Disagree - Clinically unable to determine / Unknown  -- Refer to Clinical Documentation Reviewer    PROVIDER RESPONSE TEXT:    Provider disagreed with this query. Query created by:  Devante Hadley on 2023 2:47 PM      Electronically signed by:  Laquita Frey MD 2023 3:46 PM

## 2023-01-07 NOTE — PLAN OF CARE
Problem: Pain  Goal: Verbalizes/displays adequate comfort level or baseline comfort level  Outcome: Progressing  Flowsheets (Taken 1/7/2023 1954)  Verbalizes/displays adequate comfort level or baseline comfort level: Encourage patient to monitor pain and request assistance     Problem: Discharge Planning  Goal: Discharge to home or other facility with appropriate resources  Outcome: Progressing     Problem: Skin/Tissue Integrity  Goal: Absence of new skin breakdown  Description: 1. Monitor for areas of redness and/or skin breakdown  2. Assess vascular access sites hourly  3. Every 4-6 hours minimum:  Change oxygen saturation probe site  4. Every 4-6 hours:  If on nasal continuous positive airway pressure, respiratory therapy assess nares and determine need for appliance change or resting period.   Outcome: Progressing     Problem: Safety - Adult  Goal: Free from fall injury  Outcome: Progressing  Flowsheets (Taken 1/7/2023 5435)  Free From Fall Injury: Instruct family/caregiver on patient safety     Problem: ABCDS Injury Assessment  Goal: Absence of physical injury  Outcome: Progressing  Flowsheets (Taken 1/7/2023 8845)  Absence of Physical Injury: Implement safety measures based on patient assessment

## 2023-01-08 LAB
BACTERIA SPEC CULT: NORMAL
BACTERIA SPEC CULT: NORMAL
MM INDURATION, POC: 0 MM (ref 0–5)
PPD, POC: NEGATIVE
SERVICE CMNT-IMP: NORMAL

## 2023-01-08 PROCEDURE — 6370000000 HC RX 637 (ALT 250 FOR IP): Performed by: NURSE PRACTITIONER

## 2023-01-08 PROCEDURE — 6360000002 HC RX W HCPCS: Performed by: PHYSICIAN ASSISTANT

## 2023-01-08 PROCEDURE — 1100000000 HC RM PRIVATE

## 2023-01-08 PROCEDURE — 6370000000 HC RX 637 (ALT 250 FOR IP): Performed by: FAMILY MEDICINE

## 2023-01-08 PROCEDURE — 97530 THERAPEUTIC ACTIVITIES: CPT

## 2023-01-08 PROCEDURE — 97535 SELF CARE MNGMENT TRAINING: CPT

## 2023-01-08 PROCEDURE — 2580000003 HC RX 258: Performed by: FAMILY MEDICINE

## 2023-01-08 RX ADMIN — SODIUM CHLORIDE, PRESERVATIVE FREE 10 ML: 5 INJECTION INTRAVENOUS at 09:45

## 2023-01-08 RX ADMIN — ESCITALOPRAM OXALATE 10 MG: 10 TABLET ORAL at 09:45

## 2023-01-08 RX ADMIN — TRAMADOL HYDROCHLORIDE 50 MG: 50 TABLET ORAL at 03:43

## 2023-01-08 RX ADMIN — TRAMADOL HYDROCHLORIDE 50 MG: 50 TABLET ORAL at 21:06

## 2023-01-08 RX ADMIN — SODIUM CHLORIDE, PRESERVATIVE FREE 10 ML: 5 INJECTION INTRAVENOUS at 19:30

## 2023-01-08 RX ADMIN — ENOXAPARIN SODIUM 30 MG: 100 INJECTION SUBCUTANEOUS at 09:44

## 2023-01-08 ASSESSMENT — PAIN SCALES - PAIN ASSESSMENT IN ADVANCED DEMENTIA (PAINAD)
BODYLANGUAGE: 1
NEGVOCALIZATION: 1
BREATHING: 0
NEGVOCALIZATION: 1
BREATHING: 0
FACIALEXPRESSION: 1
CONSOLABILITY: 1
FACIALEXPRESSION: 0
TOTALSCORE: 1
NEGVOCALIZATION: 0
BREATHING: 0
BODYLANGUAGE: 0
CONSOLABILITY: 1
BODYLANGUAGE: 1
CONSOLABILITY: 1
TOTALSCORE: 4
FACIALEXPRESSION: 1
TOTALSCORE: 4

## 2023-01-08 ASSESSMENT — PAIN SCALES - GENERAL
PAINLEVEL_OUTOF10: 1
PAINLEVEL_OUTOF10: 0
PAINLEVEL_OUTOF10: 0
PAINLEVEL_OUTOF10: 4
PAINLEVEL_OUTOF10: 4

## 2023-01-08 ASSESSMENT — PAIN DESCRIPTION - FREQUENCY: FREQUENCY: INTERMITTENT

## 2023-01-08 ASSESSMENT — PAIN DESCRIPTION - ONSET: ONSET: GRADUAL

## 2023-01-08 ASSESSMENT — PAIN - FUNCTIONAL ASSESSMENT
PAIN_FUNCTIONAL_ASSESSMENT: ACTIVITIES ARE NOT PREVENTED
PAIN_FUNCTIONAL_ASSESSMENT: ACTIVITIES ARE NOT PREVENTED

## 2023-01-08 ASSESSMENT — PAIN DESCRIPTION - DESCRIPTORS
DESCRIPTORS: ACHING
DESCRIPTORS: ACHING

## 2023-01-08 ASSESSMENT — PAIN DESCRIPTION - LOCATION
LOCATION: HIP
LOCATION: HIP

## 2023-01-08 ASSESSMENT — PAIN DESCRIPTION - PAIN TYPE: TYPE: ACUTE PAIN

## 2023-01-08 ASSESSMENT — PAIN DESCRIPTION - ORIENTATION
ORIENTATION: RIGHT;LEFT
ORIENTATION: LEFT;RIGHT

## 2023-01-08 NOTE — PROGRESS NOTES
Per family member Shea Balderrama ( niece), the pt preferred to go to [facility] for [address].

## 2023-01-08 NOTE — PROGRESS NOTES
Hospitalist Progress Note   Admit Date:  2023  4:59 PM   Name:  Gifty Casillas   Age:  Hanna Reilly [age] y.o. Sex:  female  :  1922   MRN:  [id number]   Room:  [facility]/    Presenting Complaint: Fall     Reason(s) for Admission: Fall, initial encounter [W19. XXXA]  [facility] [S72.001A]  Periprosthetic hip fracture, initial encounter [E45. [address] [place] Course:   Hanna Reilly [age] y.o. female with a PMH of hypertension, dementia, anxiety who presented to the ER after an unwitnessed fall. Patient lives at home by herself. Found by sister-in-law. Patient does not remember what happened. Denies loss of consciousness. Patient complaining of hip pain since fall. No other complaint. Not on any blood thinner. Labs notable for mild leukocytosis, otherwise unremarkable. XR hip showed periprosthetic fracture of the right proximal femur. CT head and CT cervical spine negative for fracture. Hospitalist consulted for admission and Ortho surgery was consulted. They will treat non-operatively. The patient will need rehab at discharge. Subjective & 24hr Events (23): Patient was seen and examined at the bedside. No overnight events. Patient resting comfortably in bed without any major complaints. Assessment & Plan:   Acute periprosthetic fracture of right proximal femur  - Plain films reviewed  - Ortho consulted  - Ortho will treat nonoperatively  - PT/OT recommending short-term rehab-continue pain control    Leukocytosis  - Likely reactive  - Resolved  - UA negative  - Chest x-ray without acute abnormality    Hypertension  - Continue home lisinopril  - As needed hydralazine    Mood disorder  - Continue Lexapro        Anticipated discharge needs:    Dispo pending clinical course. PT recommending short-term rehab    Diet:  ADULT DIET;  Regular  DVT PPx: Lovenox  Code status: DNR    Hospital Problems:  Principal Problem:    Periprosthetic hip fracture, initial encounter  Resolved Problems:    * No resolved hospital problems. *      Objective:   Patient Vitals for the past 24 hrs:   Temp Pulse Resp BP SpO2   01/08/23 0803 97.7 °F (36.5 °C) 64 17 (!) 98/49 91 %   01/08/23 0326 97.5 °F (36.4 °C) 70 16 (!) 137/38 92 %   01/08/23 0018 97.7 °F (36.5 °C) 70 16 (!) 129/56 93 %   01/07/23 1941 97.7 °F (36.5 °C) 68 16 (!) 149/61 92 %   01/07/23 1526 97.4 °F (36.3 °C) 69 16 (!) 137/52 90 %       Oxygen Therapy  SpO2: 91 %  Pulse Oximeter Device Mode: Continuous  O2 Device: None (Room air)    Estimated body mass index is 21.14 kg/m² as calculated from the following:    Height as of this encounter: 5' 7\" (1.702 m). Weight as of this encounter: 135 lb (61.2 kg). Intake/Output Summary (Last 24 hours) at 1/8/2023 1138  Last data filed at 1/7/2023 1431  Gross per 24 hour   Intake 240 ml   Output --   Net 240 ml         Physical Exam:   Blood pressure (!) 98/49, pulse 64, temperature 97.7 °F (36.5 °C), temperature source Oral, resp. rate 17, height 5' 7\" (1.702 m), weight 135 lb (61.2 kg), SpO2 91 %. General:    Well nourished. Head:  Normocephalic, atraumatic  Eyes:  Sclerae appear normal.  Pupils equally round. ENT:  Nares appear normal.  Moist oral mucosa  Neck:  No restricted ROM. Trachea midline   CV:   RRR. No m/r/g. No jugular venous distension. Lungs:   CTAB. No wheezing, rhonchi, or rales. Symmetric expansion. Abdomen:   Soft, nontender, nondistended. Extremities: No cyanosis or clubbing. No edema  Skin:     No rashes and normal coloration. Warm and dry. Neuro:  CN II-XII grossly intact. Sensation intact. Psych:  Normal mood and affect. I have personally reviewed labs and tests showing:  Recent Labs:  Recent Results (from the past 48 hour(s))   POCT Glucose    Collection Time: 01/06/23  8:37 PM   Result Value Ref Range    POC Glucose 208 (H) 65 - 100 mg/dL    Performed by:  Daquan SOOD have personally reviewed imaging studies showing: Other Studies:  XR HIP RIGHT (2-3 VIEWS)   Final Result   1. Periprosthetic fracture of the right proximal femur as described above. 2. No acute osseous abnormality of the left hip. XR HIP LEFT (2-3 VIEWS)   Final Result   1. Periprosthetic fracture of the right proximal femur as described above. 2. No acute osseous abnormality of the left hip. XR CHEST 1 VIEW   Final Result   1. Stable moderate cardiomegaly without evolving acute changes evident by plain   film. CT CSpine W/O Contrast   Final Result   1. No acute intracranial process evident by noncontrast CT study of the head. 2. No acute osseous abnormality the bony calvarium, skull base, or cervical   spine. 3 1.5 cm x 0.9 cm right upper lobe nodule. A benign process cannot be confirmed   and long-term stability cannot be demonstrated due to the lack of prior   comparison studies at this institution. A pulmonary or pleural would be   recommended for further assessment and potential management. By imaging, this   would be best initially assessed with a preferably contrasted CT scan of the   chest.      CT Head W/O Contrast   Final Result   1. No acute intracranial process evident by noncontrast CT study of the head. 2. No acute osseous abnormality the bony calvarium, skull base, or cervical   spine. 3 1.5 cm x 0.9 cm right upper lobe nodule. A benign process cannot be confirmed   and long-term stability cannot be demonstrated due to the lack of prior   comparison studies at this institution. A pulmonary or pleural would be   recommended for further assessment and potential management.  By imaging, this   would be best initially assessed with a preferably contrasted CT scan of the   chest.          Current Meds:  Current Facility-Administered Medications   Medication Dose Route Frequency    enoxaparin Sodium (LOVENOX) injection 30 mg  30 mg SubCUTAneous Daily    traMADol (ULTRAM) tablet 50 mg  50 mg Oral Q6H PRN    hydrALAZINE (APRESOLINE) injection 10 mg  10 mg IntraVENous Q6H PRN    escitalopram (LEXAPRO) tablet 10 mg  10 mg Oral Daily    lisinopril (PRINIVIL;ZESTRIL) tablet 20 mg  20 mg Oral Daily    sodium chloride flush 0.9 % injection 5-40 mL  5-40 mL IntraVENous 2 times per day    sodium chloride flush 0.9 % injection 5-40 mL  5-40 mL IntraVENous PRN    0.9 % sodium chloride infusion   IntraVENous PRN    ondansetron (ZOFRAN-ODT) disintegrating tablet 4 mg  4 mg Oral Q8H PRN    Or    ondansetron (ZOFRAN) injection 4 mg  4 mg IntraVENous Q6H PRN    polyethylene glycol (GLYCOLAX) packet 17 g  17 g Oral Daily PRN    acetaminophen (TYLENOL) tablet 650 mg  650 mg Oral Q6H PRN    Or    acetaminophen (TYLENOL) suppository 650 mg  650 mg Rectal Q6H PRN    tuberculin injection 5 Units  5 Units IntraDERmal Once       Signed:  Lj Lomax MD    Part of this note may have been written by using a voice dictation software. The note has been proof read but may still contain some grammatical/other typographical errors.

## 2023-01-08 NOTE — PLAN OF CARE
Problem: Pain  Goal: Verbalizes/displays adequate comfort level or baseline comfort level  Outcome: Progressing  Flowsheets (Taken 1/8/2023 0343)  Verbalizes/displays adequate comfort level or baseline comfort level: Assess pain using appropriate pain scale     Problem: Discharge Planning  Goal: Discharge to home or other facility with appropriate resources  Outcome: Progressing  Flowsheets (Taken 1/7/2023 1941)  Discharge to home or other facility with appropriate resources: Identify barriers to discharge with patient and caregiver     Problem: Skin/Tissue Integrity  Goal: Absence of new skin breakdown  Description: 1. Monitor for areas of redness and/or skin breakdown  2. Assess vascular access sites hourly  3. Every 4-6 hours minimum:  Change oxygen saturation probe site  4. Every 4-6 hours:  If on nasal continuous positive airway pressure, respiratory therapy assess nares and determine need for appliance change or resting period.   Outcome: Progressing     Problem: Safety - Adult  Goal: Free from fall injury  Outcome: Progressing  Flowsheets (Taken 1/7/2023 1941)  Free From Fall Injury: Instruct family/caregiver on patient safety     Problem: ABCDS Injury Assessment  Goal: Absence of physical injury  Outcome: Progressing  Flowsheets (Taken 1/7/2023 1941)  Absence of Physical Injury: Implement safety measures based on patient assessment     Problem: Neurosensory - Adult  Goal: Achieves maximal functionality and self care  Outcome: Progressing  Flowsheets (Taken 1/7/2023 1941)  Achieves maximal functionality and self care: Encourage and assist patient to increase activity and self care with guidance from physical therapy/occupational therapy     Problem: Respiratory - Adult  Goal: Achieves optimal ventilation and oxygenation  Outcome: Progressing  Flowsheets (Taken 1/7/2023 1941)  Achieves optimal ventilation and oxygenation: Assess for changes in respiratory status     Problem: Cardiovascular - Adult  Goal: Maintains optimal cardiac output and hemodynamic stability  Outcome: Progressing  Flowsheets (Taken 1/7/2023 1941)  Maintains optimal cardiac output and hemodynamic stability: Monitor blood pressure and heart rate     Problem: Skin/Tissue Integrity - Adult  Goal: Skin integrity remains intact  Outcome: Progressing  Flowsheets (Taken 1/7/2023 1941)  Skin Integrity Remains Intact: Monitor for areas of redness and/or skin breakdown  Goal: Incisions, wounds, or drain sites healing without S/S of infection  Outcome: Progressing  Flowsheets (Taken 1/7/2023 1941)  Incisions, Wounds, or Drain Sites Healing Without Sign and Symptoms of Infection: ADMISSION and DAILY: Assess and document risk factors for pressure ulcer development     Problem: Musculoskeletal - Adult  Goal: Return mobility to safest level of function  Outcome: Progressing  Flowsheets (Taken 1/7/2023 1941)  Return Mobility to Safest Level of Function: Assess patient stability and activity tolerance for standing, transferring and ambulating with or without assistive devices  Goal: Maintain proper alignment of affected body part  Outcome: Progressing  Flowsheets (Taken 1/7/2023 1941)  Maintain proper alignment of affected body part: Support and protect limb and body alignment per provider's orders  Goal: Return ADL status to a safe level of function  Outcome: Progressing  Flowsheets (Taken 1/7/2023 1941)  Return ADL Status to a Safe Level of Function: Administer medication as ordered     Problem: Gastrointestinal - Adult  Goal: Maintains adequate nutritional intake  Outcome: Progressing  Flowsheets (Taken 1/7/2023 1941)  Maintains adequate nutritional intake: Monitor percentage of each meal consumed     Problem: Genitourinary - Adult  Goal: Absence of urinary retention  Outcome: Progressing  Flowsheets (Taken 1/7/2023 1941)  Absence of urinary retention: Assess patients ability to void and empty bladder     Problem: Infection - Adult  Goal: Absence of infection at discharge  Outcome: Progressing  Flowsheets (Taken 1/7/2023 1941)  Absence of infection at discharge: Assess and monitor for signs and symptoms of infection  Goal: Absence of infection during hospitalization  Outcome: Progressing  Flowsheets (Taken 1/7/2023 1941)  Absence of infection during hospitalization: Assess and monitor for signs and symptoms of infection     Problem: Metabolic/Fluid and Electrolytes - Adult  Goal: Electrolytes maintained within normal limits  Outcome: Progressing  Flowsheets (Taken 1/7/2023 1941)  Electrolytes maintained within normal limits: Monitor labs and assess patient for signs and symptoms of electrolyte imbalances  Goal: Hemodynamic stability and optimal renal function maintained  Outcome: Progressing  Flowsheets (Taken 1/7/2023 1941)  Hemodynamic stability and optimal renal function maintained: Monitor labs and assess for signs and symptoms of volume excess or deficit

## 2023-01-08 NOTE — PROGRESS NOTES
ACUTE OCCUPATIONAL THERAPY GOALS:   (Developed with and agreed upon by patient and/or caregiver.)  1. Patient will maintain TTWB status in RLE for 100% of treatment session and MN verbal cues from therapist.   2. Patient will complete functional transfers with MIN A while maintaining TTWB status in RLE and with adaptive equipment as needed. 3. Patient will complete lower body bathing and dressing with MIN A and adaptive equipment as needed. 4. Patient will complete toileting and toilet transfer with MIN A.   5. Patient will tolerate 25 minutes of OT treatment with 1-2 rest breaks to increase activity tolerance for ADLs. 6. Patient will participate in at least 10 minutes of BUE therapeutic exercises to strengthen BUE for functional transfers. OCCUPATIONAL THERAPY: Daily Note AM   OT Visit Days: 2   Time In/Out  OT Charge Capture  Rehab Caseload Tracker  OT Orders    Rhonda Baer is a 80 y.o. female   PRIMARY DIAGNOSIS: Periprosthetic hip fracture, initial encounter  Fall, initial encounter [W19. XXXA]  [facility] [S72.001A]  Periprosthetic hip fracture, initial encounter [D71. Elena Sing       Inpatient: Payor: MEDICARE / Plan: MEDICARE PART A AND B / Product Type: *No Product type* /     ASSESSMENT:     REHAB RECOMMENDATIONS: CURRENT LEVEL OF FUNCTION:  (Most Recently Demonstrated)   Recommendation to date pending progress:  Setting:  Short-term Rehab    Equipment:    To Be Determined Bathing:  Not Tested  Dressing:  Not Tested  Feeding/Grooming:  Minimal Assist  Toileting:  Not Tested  Functional Mobility:  Moderate Assist     ASSESSMENT:  Ms. Enid Lees was supine in bed upon arrival. Pt completed bed mobility with mod A X 2. Pt completed functional mobility with mod A X 2. Pt completed grooming with min A. Pt is [place] and has short term memory loss. Pt is progressing towards goals. Continue POC. SUBJECTIVE:     Ms. Enid Lees states, \"Who are you? \" Social/Functional Lives With: Alone  Type of Home: House  Home Layout: One level  Home Equipment: Cane  Has the patient had two or more falls in the past year or any fall with injury in the past year?: Yes  Receives Help From: Family  ADL Assistance: Independent  Ambulation Assistance: Independent  Transfer Assistance: Independent    OBJECTIVE:     LINES / [place] / AIRWAY: IV    RESTRICTIONS/PRECAUTIONS:  Restrictions/Precautions  Restrictions/Precautions: Weight Bearing  Lower Extremity Weight Bearing Restrictions  Right Lower Extremity Weight Bearing: Toe Touch Weight Bearing        PAIN: VITALS / O2:   Pre Treatment: 0           Post Treatment: 0 Vitals          Oxygen        MOBILITY: I Mod I S SBA CGA Min Mod Max Total  NT x2 Comments:   Bed Mobility    Rolling [] [] [] [] [] [] [] [] [] [] []    Supine to Sit [] [] [] [] [] [] [x] [] [] [] [x]    Scooting [] [] [] [] [] [] [x] [] [] [] [x]    Sit to Supine [] [] [] [] [] [] [] [] [] [] []    Transfers    Sit to Stand [] [] [] [] [] [] [x] [] [] [] [x]    Bed to Chair [] [] [] [] [] [] [x] [] [] [] [x]    Stand to Sit [] [] [] [] [] [] [x] [] [] [] [x]    Tub/Shower [] [] [] [] [] [] [] [] [] [] []     Toilet [] [] [] [] [] [] [] [] [] [] []      [] [] [] [] [] [] [] [] [] [] []    I=Independent, Mod I=Modified Independent, S=Supervision/Setup, SBA=Standby Assistance, CGA=Contact Guard Assistance, Min=Minimal Assistance, Mod=Moderate Assistance, Max=Maximal Assistance, Total=Total Assistance, NT=Not Tested    ACTIVITIES OF DAILY LIVING: I Mod I S SBA CGA Min Mod Max Total NT Comments   BASIC ADLs:              Upper Body   Bathing [] [] [] [] [] [] [] [] [] []    Lower Body Bathing [] [] [] [] [] [] [] [] [] []    Toileting [] [] [] [] [] [] [] [] [] []    Upper Body Dressing [] [] [] [] [] [] [] [] [] []    Lower Body Dressing [] [] [] [] [] [] [] [] [] []    Feeding [] [] [] [] [] [] [] [] [] []    Grooming [] [] [] [] [] [x] [] [] [] []    Personal Device Care [] [] [] [] [] [] [] [] [] []    Functional Mobility [] [] [] [] [] [] [] [] [] []    I=Independent, Mod I=Modified Independent, S=Supervision/Setup, SBA=Standby Assistance, CGA=Contact Guard Assistance, Min=Minimal Assistance, Mod=Moderate Assistance, Max=Maximal Assistance, Total=Total Assistance, NT=Not Tested    BALANCE: Good Fair+ Fair Fair- Poor NT Comments   Sitting Static [] [] [x] [] [] []    Sitting Dynamic [] [] [] [] [] []              Standing Static [] [] [] [x] [] []    Standing Dynamic [] [] [] [] [] []        PLAN:     FREQUENCY/DURATION   OT Plan of Care: 5 times/week for duration of hospital stay or until stated goals are met, whichever comes first.    TREATMENT:     TREATMENT:   Co-Treatment PT/OT necessary due to patient's decreased overall endurance/tolerance levels, as well as need for high level skilled assistance to complete functional transfers/mobility and functional tasks  Self Care (31 minutes): Patient participated in grooming ADLs in unsupported sitting and standing with minimal verbal and manual cueing to increase independence and decrease assistance required. Patient also participated in energy conservation training to increase independence and decrease assistance required.      TREATMENT GRID:  N/A    AFTER TREATMENT PRECAUTIONS: Alarm Activated, Bed/Chair Locked, Call light within reach, Chair, Needs within reach, RN notified, and Visitors at bedside    INTERDISCIPLINARY COLLABORATION:  RN/ PCT, PT/ PTA, and OT/ MORGAN    EDUCATION:       TOTAL TREATMENT DURATION AND TIME:  Time In: 1019  Time Out: Mehul 38  Minutes: [address] [facility], [facility]

## 2023-01-08 NOTE — PROGRESS NOTES
________________________________________________________________________________________________    ACUTE PHYSICAL THERAPY GOALS:   (Developed with and agreed upon by patient and/or caregiver.)     (1.)Ms. Jarocho Azevedo will move from supine to sit and sit to supine , scoot up and down, and roll side to side in bed with SUPERVISION within 7 treatment day(s). (2.)Ms. Jarocho Azevedo will transfer from bed to chair and chair to bed with MINIMAL ASSIST using the least restrictive device within 7 treatment day(s). (3.)Ms. Jarocho Azevedo will ambulate with MINIMAL ASSIST for 50 feet with the least restrictive device within 7 treatment day(s). PHYSICAL THERAPY: Daily Note AM   (Link to Caseload Tracking: PT Visit Days : 2  Time In/Out PT Charge Capture  Rehab Caseload Tracker  Orders       TTWB RLE, no hip abduction    Luc Michelle is a 80 y.o. female   PRIMARY DIAGNOSIS: Periprosthetic hip fracture, initial encounter  Fall, initial encounter [W19. XXXA]  [facility] [S72.001A]  Periprosthetic hip fracture, initial encounter [P06. [facility]       Inpatient: Payor: MEDICARE / Plan: MEDICARE PART A AND B / Product Type: *No Product type* /     ASSESSMENT:     REHAB RECOMMENDATIONS:   Recommendation to date pending progress:  Setting:  Short-term Rehab    Equipment:    To Be Determined     ASSESSMENT:  Ms. Jarocho Azevedo is supine in bed and willing to work with PT/OT this AM. Her niece entered the room and assisted with communication, as pt is very [facility] and has short term memory loss. Mod A x2 for supine>sit transfer. Fair +/good seated balance noted and mod A x2 for STS to RW. Mod A x2 for bed>chair transfer. LE exercises performed with a lot of verbal and visual cues. Pt left reclined in chair with needs in reach, alarm activated and family present. PT to continue to follow. SUBJECTIVE:   Ms. Jarocho Azevedo states, \"She's the reason I dont have to go to the old person home. \" Social/Functional Lives With: Alone  Type of Home: House  Home Layout: One level  Home Equipment: Cane  Has the patient had two or more falls in the past year or any fall with injury in the past year?: Yes  Receives Help From: Family  ADL Assistance: Independent  Ambulation Assistance: Independent  Transfer Assistance: Independent  OBJECTIVE:     PAIN: [place] / O2: PRECAUTION / Jamal Feliz / Jhonathan White:   Pre Treatment:          Post Treatment: no number given Vitals        Oxygen    IV    RESTRICTIONS/PRECAUTIONS:  Restrictions/Precautions  Restrictions/Precautions: Weight Bearing  Lower Extremity Weight Bearing Restrictions  Right Lower Extremity Weight Bearing: Toe Touch Weight Bearing  Restrictions/Precautions: Weight Bearing     MOBILITY: I Mod I S SBA CGA Min Mod Max Total  NT x2 Comments:   Bed Mobility    Rolling [] [] [] [] [] [] [] [] [] [] []    Supine to Sit [] [] [] [] [] [] [x] [] [] [] [x]    Scooting [] [] [] [] [] [] [x] [] [] [] [x]    Sit to Supine [] [] [] [] [] [] [] [] [] [] []    Transfers    Sit to Stand [] [] [] [] [] [] [x] [] [] [] [x]    Bed to Chair [] [] [] [] [] [] [x] [] [] [] [x]    Stand to Sit [] [] [] [] [] [] [x] [] [] [] [x]     [] [] [] [] [] [] [] [] [] [] []    I=Independent, Mod I=Modified Independent, S=Supervision, SBA=Standby Assistance, CGA=Contact Guard Assistance,   Min=Minimal Assistance, Mod=Moderate Assistance, Max=Maximal Assistance, Total=Total Assistance, NT=Not Tested    BALANCE: Good Fair+ Fair Fair- Poor NT Comments   Sitting Static [x] [x] [] [] [] []    Sitting Dynamic [] [x] [] [] [] []              Standing Static [] [] [] [x] [] []    Standing Dynamic [] [] [] [x] [x] []      GAIT: I Mod I S SBA CGA Min Mod Max Total  NT x2 Comments:   Level of Assistance [] [] [] [] [] [] [] [] [] [] []    Distance   feet    DME Gait Belt and Rolling Walker    Gait Quality Decreased step clearance, Decreased step length, and Step-to    Weightbearing Status      Stairs I=Independent, Mod I=Modified Independent, S=Supervision, SBA=Standby Assistance, CGA=Contact Guard Assistance,   Min=Minimal Assistance, Mod=Moderate Assistance, Max=Maximal Assistance, Total=Total Assistance, NT=Not Tested    PLAN:   FREQUENCY AND DURATION: Daily for duration of hospital stay or until stated goals are met, whichever comes first.    TREATMENT:   TREATMENT:   Co-Treatment PT/OT necessary due to patient's decreased overall endurance/tolerance levels, as well as need for high level skilled assistance to complete functional transfers/mobility and functional tasks  Therapeutic Activity (26 Minutes): Therapeutic activity included Supine to Sit, Scooting, Transfer Training, Ambulation on level ground, Sitting balance , Standing balance, and LE exerciese to improve functional Activity tolerance, Balance, Mobility, Strength, and ROM.     TREATMENT GRID:   Date:  1/8/23 Date:   Date:     Activity/Exercise Parameters Parameters Parameters   APs 20x     LAQ 10x                                       AFTER TREATMENT PRECAUTIONS: Alarm Activated, Bed/Chair Locked, Call light within reach, Chair, Needs within reach, RN notified, and Visitors at bedside    INTERDISCIPLINARY COLLABORATION:  RN/ PCT, PT/ PTA, and OT/ MORGAN    EDUCATION:      TIME IN/OUT:  Time In: 1019  Time Out: Mehul 38  Minutes: [facility], PT

## 2023-01-08 NOTE — PLAN OF CARE
Problem: Pain  Goal: Verbalizes/displays adequate comfort level or baseline comfort level  1/8/2023 0824 by Rosy Delaney RN  Outcome: Progressing  Flowsheets (Taken 1/8/2023 0803)  Verbalizes/displays adequate comfort level or baseline comfort level: Encourage patient to monitor pain and request assistance  1/8/2023 0513 by Vincent Lagunas RN  Outcome: Progressing  Flowsheets (Taken 1/8/2023 0343)  Verbalizes/displays adequate comfort level or baseline comfort level: Assess pain using appropriate pain scale     Problem: Discharge Planning  Goal: Discharge to home or other facility with appropriate resources  1/8/2023 0824 by Rosy Delaney RN  Outcome: Progressing  Flowsheets (Taken 1/8/2023 0730)  Discharge to home or other facility with appropriate resources: Identify barriers to discharge with patient and caregiver  1/8/2023 0513 by Vincent Lagunas RN  Outcome: Progressing  Flowsheets (Taken 1/7/2023 1941)  Discharge to home or other facility with appropriate resources: Identify barriers to discharge with patient and caregiver     Problem: Skin/Tissue Integrity  Goal: Absence of new skin breakdown  Description: 1. Monitor for areas of redness and/or skin breakdown  2. Assess vascular access sites hourly  3. Every 4-6 hours minimum:  Change oxygen saturation probe site  4. Every 4-6 hours:  If on nasal continuous positive airway pressure, respiratory therapy assess nares and determine need for appliance change or resting period.   1/8/2023 0824 by Rosy Delaney RN  Outcome: Progressing  1/8/2023 0513 by Vincent Lagunas RN  Outcome: Progressing     Problem: Safety - Adult  Goal: Free from fall injury  1/8/2023 0824 by Rosy Delaney RN  Outcome: Progressing  Flowsheets (Taken 1/8/2023 0730)  Free From Fall Injury: Instruct family/caregiver on patient safety  1/8/2023 0513 by Vincent Lagunas RN  Outcome: Progressing  Flowsheets (Taken 1/7/2023 1941)  Free From Fall Injury: Instruct family/caregiver on patient safety     Problem: ABCDS Injury Assessment  Goal: Absence of physical injury  1/8/2023 0824 by Lauri Adrian RN  Outcome: Progressing  Flowsheets (Taken 1/8/2023 0730)  Absence of Physical Injury: Implement safety measures based on patient assessment  1/8/2023 0513 by Shankar Lehman RN  Outcome: Progressing  Flowsheets (Taken 1/7/2023 1941)  Absence of Physical Injury: Implement safety measures based on patient assessment     Problem: Neurosensory - Adult  Goal: Achieves maximal functionality and self care  1/8/2023 0824 by Lauri Adrian RN  Outcome: Progressing  Flowsheets (Taken 1/8/2023 0730)  Achieves maximal functionality and self care: Encourage and assist patient to increase activity and self care with guidance from physical therapy/occupational therapy  1/8/2023 0513 by Shankar Lehman RN  Outcome: Progressing  Flowsheets (Taken 1/7/2023 1941)  Achieves maximal functionality and self care: Encourage and assist patient to increase activity and self care with guidance from physical therapy/occupational therapy     Problem: Respiratory - Adult  Goal: Achieves optimal ventilation and oxygenation  1/8/2023 0824 by Lauri Adrian RN  Outcome: Progressing  Flowsheets (Taken 1/8/2023 0730)  Achieves optimal ventilation and oxygenation: Assess for changes in respiratory status  1/8/2023 0513 by Shankar Lehman RN  Outcome: Progressing  Flowsheets (Taken 1/7/2023 1941)  Achieves optimal ventilation and oxygenation: Assess for changes in respiratory status     Problem: Cardiovascular - Adult  Goal: Maintains optimal cardiac output and hemodynamic stability  1/8/2023 0824 by Lauri Adrian RN  Outcome: Progressing  Flowsheets (Taken 1/8/2023 0730)  Maintains optimal cardiac output and hemodynamic stability: Monitor blood pressure and heart rate  1/8/2023 0513 by Shankar Lehman RN  Outcome: Progressing  Flowsheets (Taken 1/7/2023 1941)  Maintains optimal cardiac output and hemodynamic stability: Monitor blood pressure and heart rate     Problem: Skin/Tissue Integrity - Adult  Goal: Skin integrity remains intact  1/8/2023 0824 by Kya Amezquita RN  Outcome: Progressing  Flowsheets (Taken 1/8/2023 0730)  Skin Integrity Remains Intact: Monitor for areas of redness and/or skin breakdown  1/8/2023 0513 by Vega Warner RN  Outcome: Progressing  Flowsheets (Taken 1/7/2023 1941)  Skin Integrity Remains Intact: Monitor for areas of redness and/or skin breakdown  Goal: Incisions, wounds, or drain sites healing without S/S of infection  1/8/2023 0824 by Kya Amezquita RN  Outcome: Progressing  1/8/2023 0513 by Vega Warner RN  Outcome: Progressing  Flowsheets (Taken 1/7/2023 1941)  Incisions, Wounds, or Drain Sites Healing Without Sign and Symptoms of Infection: ADMISSION and DAILY: Assess and document risk factors for pressure ulcer development     Problem: Musculoskeletal - Adult  Goal: Return mobility to safest level of function  1/8/2023 0824 by Kya Amezquita RN  Outcome: Progressing  Flowsheets (Taken 1/8/2023 0730)  Return Mobility to Safest Level of Function: Assess patient stability and activity tolerance for standing, transferring and ambulating with or without assistive devices  1/8/2023 0513 by Vega Warner RN  Outcome: Progressing  Flowsheets (Taken 1/7/2023 1941)  Return Mobility to Safest Level of Function: Assess patient stability and activity tolerance for standing, transferring and ambulating with or without assistive devices  Goal: Maintain proper alignment of affected body part  1/8/2023 0824 by Kya Amezquita RN  Outcome: Progressing  1/8/2023 0513 by Vega Warner RN  Outcome: Progressing  Flowsheets (Taken 1/7/2023 1941)  Maintain proper alignment of affected body part: Support and protect limb and body alignment per provider's orders  Goal: Return ADL status to a safe level of function  1/8/2023 0824 by Kya Amezquita RN  Outcome: Progressing  1/8/2023 0513 by Corinna Centeno RN  Outcome: Progressing  Flowsheets (Taken 1/7/2023 1941)  Return ADL Status to a Safe Level of Function: Administer medication as ordered     Problem: Gastrointestinal - Adult  Goal: Maintains adequate nutritional intake  1/8/2023 0824 by Jaya Childress RN  Outcome: Progressing  Flowsheets (Taken 1/8/2023 0730)  Maintains adequate nutritional intake: Monitor percentage of each meal consumed  1/8/2023 0513 by Corinna Centeno RN  Outcome: Progressing  Flowsheets (Taken 1/7/2023 1941)  Maintains adequate nutritional intake: Monitor percentage of each meal consumed     Problem: Genitourinary - Adult  Goal: Absence of urinary retention  1/8/2023 0824 by Jaya Childress RN  Outcome: Progressing  1/8/2023 0513 by Corinna Centeno RN  Outcome: Progressing  Flowsheets (Taken 1/7/2023 1941)  Absence of urinary retention: Assess patients ability to void and empty bladder     Problem: Infection - Adult  Goal: Absence of infection at discharge  1/8/2023 0824 by Jaya Childress RN  Outcome: Progressing  Flowsheets (Taken 1/8/2023 0730)  Absence of infection at discharge: Assess and monitor for signs and symptoms of infection  1/8/2023 0513 by Corinna Centeno RN  Outcome: Progressing  Flowsheets (Taken 1/7/2023 1941)  Absence of infection at discharge: Assess and monitor for signs and symptoms of infection  Goal: Absence of infection during hospitalization  1/8/2023 0824 by Jaya Childress RN  Outcome: Progressing  1/8/2023 0513 by Corinna Centeno RN  Outcome: Progressing  Flowsheets (Taken 1/7/2023 1941)  Absence of infection during hospitalization: Assess and monitor for signs and symptoms of infection     Problem: Metabolic/Fluid and Electrolytes - Adult  Goal: Electrolytes maintained within normal limits  1/8/2023 0824 by Jaya Childress RN  Outcome: Progressing  Flowsheets (Taken 1/8/2023 0730)  Electrolytes maintained within normal limits: Monitor labs and assess patient for signs and symptoms of electrolyte imbalances  1/8/2023 0513 by Ari Gilman RN  Outcome: Progressing  Flowsheets (Taken 1/7/2023 1941)  Electrolytes maintained within normal limits: Monitor labs and assess patient for signs and symptoms of electrolyte imbalances  Goal: Hemodynamic stability and optimal renal function maintained  1/8/2023 0824 by Constance Halsted, RN  Outcome: Progressing  1/8/2023 0513 by Ari Gilman RN  Outcome: Progressing  Flowsheets (Taken 1/7/2023 1941)  Hemodynamic stability and optimal renal function maintained: Monitor labs and assess for signs and symptoms of volume excess or deficit

## 2023-01-09 LAB
ALBUMIN SERPL-MCNC: 3 G/DL (ref 3.2–4.6)
ALBUMIN/GLOB SERPL: 0.8 (ref 0.4–1.6)
ALP SERPL-CCNC: 79 U/L (ref 50–136)
ALT SERPL-CCNC: 16 U/L (ref 12–65)
ANION GAP SERPL CALC-SCNC: 4 MMOL/L (ref 2–11)
AST SERPL-CCNC: 27 U/L (ref 15–37)
BASOPHILS # BLD: 0.1 K/UL (ref 0–0.2)
BASOPHILS NFR BLD: 1 % (ref 0–2)
BILIRUB SERPL-MCNC: 1.4 MG/DL (ref 0.2–1.1)
BUN SERPL-MCNC: 29 MG/DL (ref 8–23)
CALCIUM SERPL-MCNC: 8.9 MG/DL (ref 8.3–10.4)
CHLORIDE SERPL-SCNC: 100 MMOL/L (ref 101–110)
CO2 SERPL-SCNC: 30 MMOL/L (ref 21–32)
CREAT SERPL-MCNC: 0.8 MG/DL (ref 0.6–1)
DIFFERENTIAL METHOD BLD: ABNORMAL
EOSINOPHIL # BLD: 0.1 K/UL (ref 0–0.8)
EOSINOPHIL NFR BLD: 1 % (ref 0.5–7.8)
ERYTHROCYTE [DISTWIDTH] IN BLOOD BY AUTOMATED COUNT: 14.3 % (ref 11.9–14.6)
GLOBULIN SER CALC-MCNC: 3.7 G/DL (ref 2.8–4.5)
GLUCOSE SERPL-MCNC: 109 MG/DL (ref 65–100)
HCT VFR BLD AUTO: 28.2 % (ref 35.8–46.3)
HGB BLD-MCNC: 9.2 G/DL (ref 11.7–15.4)
IMM GRANULOCYTES # BLD AUTO: 0 K/UL (ref 0–0.5)
IMM GRANULOCYTES NFR BLD AUTO: 0 % (ref 0–5)
LYMPHOCYTES # BLD: 0.9 K/UL (ref 0.5–4.6)
LYMPHOCYTES NFR BLD: 12 % (ref 13–44)
MCH RBC QN AUTO: 31.9 PG (ref 26.1–32.9)
MCHC RBC AUTO-ENTMCNC: 32.6 G/DL (ref 31.4–35)
MCV RBC AUTO: 97.9 FL (ref 82–102)
MM INDURATION, POC: 0 MM (ref 0–5)
MONOCYTES # BLD: 0.9 K/UL (ref 0.1–1.3)
MONOCYTES NFR BLD: 12 % (ref 4–12)
NEUTS SEG # BLD: 5.7 K/UL (ref 1.7–8.2)
NEUTS SEG NFR BLD: 74 % (ref 43–78)
NRBC # BLD: 0 K/UL (ref 0–0.2)
PLATELET # BLD AUTO: 160 K/UL (ref 150–450)
PMV BLD AUTO: 11.4 FL (ref 9.4–12.3)
POTASSIUM SERPL-SCNC: 4.1 MMOL/L (ref 3.5–5.1)
PPD, POC: NEGATIVE
PROT SERPL-MCNC: 6.7 G/DL (ref 6.3–8.2)
RBC # BLD AUTO: 2.88 M/UL (ref 4.05–5.2)
SODIUM SERPL-SCNC: 134 MMOL/L (ref 133–143)
WBC # BLD AUTO: 7.7 K/UL (ref 4.3–11.1)

## 2023-01-09 PROCEDURE — 6360000002 HC RX W HCPCS: Performed by: PHYSICIAN ASSISTANT

## 2023-01-09 PROCEDURE — 6370000000 HC RX 637 (ALT 250 FOR IP): Performed by: FAMILY MEDICINE

## 2023-01-09 PROCEDURE — 36415 COLL VENOUS BLD VENIPUNCTURE: CPT

## 2023-01-09 PROCEDURE — 6370000000 HC RX 637 (ALT 250 FOR IP): Performed by: STUDENT IN AN ORGANIZED HEALTH CARE EDUCATION/TRAINING PROGRAM

## 2023-01-09 PROCEDURE — 80053 COMPREHEN METABOLIC PANEL: CPT

## 2023-01-09 PROCEDURE — 97535 SELF CARE MNGMENT TRAINING: CPT

## 2023-01-09 PROCEDURE — 6370000000 HC RX 637 (ALT 250 FOR IP): Performed by: NURSE PRACTITIONER

## 2023-01-09 PROCEDURE — 1100000000 HC RM PRIVATE

## 2023-01-09 PROCEDURE — 97530 THERAPEUTIC ACTIVITIES: CPT

## 2023-01-09 PROCEDURE — 2580000003 HC RX 258: Performed by: FAMILY MEDICINE

## 2023-01-09 PROCEDURE — 85025 COMPLETE CBC W/AUTO DIFF WBC: CPT

## 2023-01-09 RX ORDER — DIMETHICONE, CAMPHOR (SYNTHETIC), MENTHOL, AND PHENOL 1.1; .5; .625; .5 G/100G; G/100G; G/100G; G/100G
OINTMENT TOPICAL PRN
Status: DISCONTINUED | OUTPATIENT
Start: 2023-01-09 | End: 2023-01-12 | Stop reason: HOSPADM

## 2023-01-09 RX ADMIN — ACETAMINOPHEN 650 MG: 325 TABLET ORAL at 09:45

## 2023-01-09 RX ADMIN — LISINOPRIL 20 MG: 20 TABLET ORAL at 09:45

## 2023-01-09 RX ADMIN — SODIUM CHLORIDE, PRESERVATIVE FREE 5 ML: 5 INJECTION INTRAVENOUS at 23:14

## 2023-01-09 RX ADMIN — ESCITALOPRAM OXALATE 10 MG: 10 TABLET ORAL at 09:45

## 2023-01-09 RX ADMIN — POLYETHYLENE GLYCOL 3350 17 G: 17 POWDER, FOR SOLUTION ORAL at 16:43

## 2023-01-09 RX ADMIN — SODIUM CHLORIDE, PRESERVATIVE FREE 10 ML: 5 INJECTION INTRAVENOUS at 09:47

## 2023-01-09 RX ADMIN — TRAMADOL HYDROCHLORIDE 50 MG: 50 TABLET ORAL at 03:33

## 2023-01-09 RX ADMIN — Medication: at 16:42

## 2023-01-09 RX ADMIN — ENOXAPARIN SODIUM 30 MG: 100 INJECTION SUBCUTANEOUS at 09:47

## 2023-01-09 ASSESSMENT — PAIN SCALES - PAIN ASSESSMENT IN ADVANCED DEMENTIA (PAINAD)
FACIALEXPRESSION: 1
NEGVOCALIZATION: 0
BODYLANGUAGE: 0
CONSOLABILITY: 0
BREATHING: 0
CONSOLABILITY: 0
FACIALEXPRESSION: 0
TOTALSCORE: 0
BODYLANGUAGE: 0
BREATHING: 0
TOTALSCORE: 4
NEGVOCALIZATION: 1
NEGVOCALIZATION: 0
BODYLANGUAGE: 1
FACIALEXPRESSION: 0
TOTALSCORE: 0
CONSOLABILITY: 1
BREATHING: 0

## 2023-01-09 ASSESSMENT — PAIN DESCRIPTION - LOCATION
LOCATION: HIP
LOCATION: HIP

## 2023-01-09 ASSESSMENT — PAIN SCALES - GENERAL
PAINLEVEL_OUTOF10: 0
PAINLEVEL_OUTOF10: 4
PAINLEVEL_OUTOF10: 3
PAINLEVEL_OUTOF10: 0

## 2023-01-09 ASSESSMENT — PAIN - FUNCTIONAL ASSESSMENT
PAIN_FUNCTIONAL_ASSESSMENT: ACTIVITIES ARE NOT PREVENTED
PAIN_FUNCTIONAL_ASSESSMENT: PREVENTS OR INTERFERES SOME ACTIVE ACTIVITIES AND ADLS

## 2023-01-09 ASSESSMENT — PAIN DESCRIPTION - FREQUENCY
FREQUENCY: INTERMITTENT
FREQUENCY: INTERMITTENT

## 2023-01-09 ASSESSMENT — PAIN DESCRIPTION - DESCRIPTORS
DESCRIPTORS: ACHING
DESCRIPTORS: DISCOMFORT

## 2023-01-09 ASSESSMENT — PAIN DESCRIPTION - ONSET
ONSET: GRADUAL
ONSET: GRADUAL

## 2023-01-09 ASSESSMENT — PAIN DESCRIPTION - ORIENTATION
ORIENTATION: LEFT;RIGHT
ORIENTATION: LEFT;RIGHT

## 2023-01-09 ASSESSMENT — PAIN DESCRIPTION - PAIN TYPE: TYPE: ACUTE PAIN

## 2023-01-09 NOTE — PROGRESS NOTES
Hospitalist Progress Note   Admit Date:  2023  4:59 PM   Name:  Geraldine Bell   Age:  80 y.o. Sex:  female  :  1922   MRN:  [id number]   Room:  [facility]/    Presenting Complaint: Fall     Reason(s) for Admission: Fall, initial encounter [W19. XXXA]  Closed right hip fracture, initial encounter ([facility] [address] [S72.001A]  Periprosthetic hip fracture, initial encounter [Q19. One [place] Course:   80 y.o. female with a PMH of hypertension, dementia, anxiety who presented to the ER after an unwitnessed fall. Patient lives at home by herself. Found by sister-in-law. Patient does not remember what happened. Denies loss of consciousness. Patient complaining of hip pain since fall. No other complaint. Not on any blood thinner. Labs notable for mild leukocytosis, otherwise unremarkable. XR hip showed periprosthetic fracture of the right proximal femur. CT head and CT cervical spine negative for fracture. Hospitalist consulted for admission and Ortho surgery was consulted. They will treat non-operatively. The patient will need rehab at discharge. Subjective & 24hr Events (23): Patient was seen and examined at the bedside. No overnight events. Patient resting comfortably in bed without any major complaints. Waiting approval for short-term rehab. Assessment & Plan:   Acute periprosthetic fracture of right proximal femur  - Plain films reviewed  - Ortho consulted  - Ortho will treat nonoperatively  - PT/OT recommending short-term rehab  -continue pain control    Leukocytosis  - Likely reactive  - Resolved  - UA negative  - Chest x-ray without acute abnormality    Hypertension  - Continue home lisinopril  - As needed hydralazine    Mood disorder  - Continue Lexapro        Anticipated discharge needs:    Dispo pending clinical course. PT recommending short-term rehab    Diet:  ADULT DIET;  Regular  DVT PPx: Lovenox  Code status: DNR    Hospital Problems:  Principal Problem: Periprosthetic hip fracture, initial encounter  Resolved Problems:    * No resolved hospital problems. *      Objective:   Patient Vitals for the past 24 hrs:   Temp Pulse Resp BP SpO2   01/09/23 1134 97.5 °F (36.4 °C) 61 19 (!) 105/40 96 %   01/09/23 0742 97.3 °F (36.3 °C) 66 18 (!) 132/52 93 %   01/09/23 0359 97.7 °F (36.5 °C) 70 16 (!) 144/68 96 %   01/09/23 0333 -- -- 18 -- --   01/09/23 0025 97.9 °F (36.6 °C) 70 17 (!) 175/57 93 %   01/08/23 2106 -- -- 20 -- --   01/08/23 1940 97.7 °F (36.5 °C) 73 16 (!) 144/63 92 %   01/08/23 1653 97.9 °F (36.6 °C) 64 18 (!) 161/54 91 %       Oxygen Therapy  SpO2: 96 %  Pulse Oximeter Device Mode: Continuous  O2 Device: None (Room air)    Estimated body mass index is 21.14 kg/m² as calculated from the following:    Height as of this encounter: 5' 7\" (1.702 m). Weight as of this encounter: 135 lb (61.2 kg). No intake or output data in the 24 hours ending 01/09/23 1344        Physical Exam:   Blood pressure (!) 105/40, pulse 61, temperature 97.5 °F (36.4 °C), temperature source Oral, resp. rate 19, height 5' 7\" (1.702 m), weight 135 lb (61.2 kg), SpO2 96 %. General:    Well nourished. Head:  Normocephalic, atraumatic  Eyes:  Sclerae appear normal.  Pupils equally round. ENT:  Nares appear normal.  Moist oral mucosa  Neck:  No restricted ROM. Trachea midline   CV:   RRR. No m/r/g. No jugular venous distension. Lungs:   CTAB. No wheezing, rhonchi, or rales. Symmetric expansion. Abdomen:   Soft, nontender, nondistended. Extremities: No cyanosis or clubbing. No edema  Skin:     No rashes and normal coloration. Warm and dry. Neuro:  CN II-XII grossly intact. Sensation intact. Psych:  Normal mood and affect.       I have personally reviewed labs and tests showing:  Recent Labs:  Recent Results (from the past 48 hour(s))   PLEASE READ & DOCUMENT PPD TEST IN 24 HRS    Collection Time: 01/08/23  4:15 PM   Result Value Ref Range    PPD, (POC) Negative Negative mm Induration 0 0 - 5 mm   Comprehensive Metabolic Panel w/ Reflex to MG    Collection Time: 01/09/23  9:45 AM   Result Value Ref Range    Sodium 134 133 - 143 mmol/L    Potassium 4.1 3.5 - 5.1 mmol/L    Chloride 100 (L) 101 - 110 mmol/L    CO2 30 21 - 32 mmol/L    Anion Gap 4 2 - 11 mmol/L    Glucose 109 (H) 65 - 100 mg/dL    BUN 29 (H) 8 - 23 MG/DL    Creatinine 0.80 0.6 - 1.0 MG/DL    Est, Glom Filt Rate >60 >60 ml/min/1.73m2    Calcium 8.9 8.3 - 10.4 MG/DL    Total Bilirubin 1.4 (H) 0.2 - 1.1 MG/DL    ALT 16 12 - 65 U/L    AST 27 15 - 37 U/L    Alk Phosphatase 79 50 - 136 U/L    Total Protein 6.7 6.3 - 8.2 g/dL    Albumin 3.0 (L) 3.2 - 4.6 g/dL    Globulin 3.7 2.8 - 4.5 g/dL    Albumin/Globulin Ratio 0.8 0.4 - 1.6     CBC with Auto Differential    Collection Time: 01/09/23  9:45 AM   Result Value Ref Range    WBC 7.7 4.3 - 11.1 K/uL    RBC 2.88 (L) 4.05 - 5.2 M/uL    Hemoglobin 9.2 (L) 11.7 - 15.4 g/dL    Hematocrit 28.2 (L) 35.8 - 46.3 %    MCV 97.9 82 - 102 FL    MCH 31.9 26.1 - 32.9 PG    MCHC 32.6 31.4 - 35.0 g/dL    RDW 14.3 11.9 - 14.6 %    Platelets 450 561 - 831 K/uL    MPV 11.4 9.4 - 12.3 FL    nRBC 0.00 0.0 - 0.2 K/uL    Differential Type AUTOMATED      Seg Neutrophils 74 43 - 78 %    Lymphocytes 12 (L) 13 - 44 %    Monocytes 12 4.0 - 12.0 %    Eosinophils % 1 0.5 - 7.8 %    Basophils 1 0.0 - 2.0 %    Immature Granulocytes 0 0.0 - 5.0 %    Segs Absolute 5.7 1.7 - 8.2 K/UL    Absolute Lymph # 0.9 0.5 - 4.6 K/UL    Absolute Mono # 0.9 0.1 - 1.3 K/UL    Absolute Eos # 0.1 0.0 - 0.8 K/UL    Basophils Absolute 0.1 0.0 - 0.2 K/UL    Absolute Immature Granulocyte 0.0 0.0 - 0.5 K/UL       I have personally reviewed imaging studies showing: Other Studies:  XR HIP RIGHT (2-3 VIEWS)   Final Result   1. Periprosthetic fracture of the right proximal femur as described above. 2. No acute osseous abnormality of the left hip. XR HIP LEFT (2-3 VIEWS)   Final Result   1.  Periprosthetic fracture of the right proximal femur as described above. 2. No acute osseous abnormality of the left hip. XR CHEST 1 VIEW   Final Result   1. Stable moderate cardiomegaly without evolving acute changes evident by plain   film. CT CSpine W/O Contrast   Final Result   1. No acute intracranial process evident by noncontrast CT study of the head. 2. No acute osseous abnormality the bony calvarium, skull base, or cervical   spine. 3 1.5 cm x 0.9 cm right upper lobe nodule. A benign process cannot be confirmed   and long-term stability cannot be demonstrated due to the lack of prior   comparison studies at this institution. A pulmonary or pleural would be   recommended for further assessment and potential management. By imaging, this   would be best initially assessed with a preferably contrasted CT scan of the   chest.      CT Head W/O Contrast   Final Result   1. No acute intracranial process evident by noncontrast CT study of the head. 2. No acute osseous abnormality the bony calvarium, skull base, or cervical   spine. 3 1.5 cm x 0.9 cm right upper lobe nodule. A benign process cannot be confirmed   and long-term stability cannot be demonstrated due to the lack of prior   comparison studies at this institution. A pulmonary or pleural would be   recommended for further assessment and potential management.  By imaging, this   would be best initially assessed with a preferably contrasted CT scan of the   chest.          Current Meds:  Current Facility-Administered Medications   Medication Dose Route Frequency    enoxaparin Sodium (LOVENOX) injection 30 mg  30 mg SubCUTAneous Daily    traMADol (ULTRAM) tablet 50 mg  50 mg Oral Q6H PRN    hydrALAZINE (APRESOLINE) injection 10 mg  10 mg IntraVENous Q6H PRN    escitalopram (LEXAPRO) tablet 10 mg  10 mg Oral Daily    lisinopril (PRINIVIL;ZESTRIL) tablet 20 mg  20 mg Oral Daily    sodium chloride flush 0.9 % injection 5-40 mL  5-40 mL IntraVENous 2 times per day    sodium chloride flush 0.9 % injection 5-40 mL  5-40 mL IntraVENous PRN    0.9 % sodium chloride infusion   IntraVENous PRN    ondansetron (ZOFRAN-ODT) disintegrating tablet 4 mg  4 mg Oral Q8H PRN    Or    ondansetron (ZOFRAN) injection 4 mg  4 mg IntraVENous Q6H PRN    polyethylene glycol (GLYCOLAX) packet 17 g  17 g Oral Daily PRN    acetaminophen (TYLENOL) tablet 650 mg  650 mg Oral Q6H PRN    Or    acetaminophen (TYLENOL) suppository 650 mg  650 mg Rectal Q6H PRN       Signed:  Екатерина Raymond MD    Part of this note may have been written by using a voice dictation software. The note has been proof read but may still contain some grammatical/other typographical errors.

## 2023-01-09 NOTE — PLAN OF CARE
Problem: Pain  Goal: Verbalizes/displays adequate comfort level or baseline comfort level  1/9/2023 0809 by Chandrika Mena RN  Outcome: Progressing  1/9/2023 0233 by Corinna Centeno RN  Outcome: Progressing     Problem: Discharge Planning  Goal: Discharge to home or other facility with appropriate resources  1/9/2023 0809 by Chandrika Mena RN  Outcome: Progressing  1/9/2023 0233 by Corinna Centeno RN  Outcome: Progressing  Flowsheets (Taken 1/8/2023 1940)  Discharge to home or other facility with appropriate resources: Identify barriers to discharge with patient and caregiver     Problem: Skin/Tissue Integrity  Goal: Absence of new skin breakdown  Description: 1. Monitor for areas of redness and/or skin breakdown  2. Assess vascular access sites hourly  3. Every 4-6 hours minimum:  Change oxygen saturation probe site  4. Every 4-6 hours:  If on nasal continuous positive airway pressure, respiratory therapy assess nares and determine need for appliance change or resting period.   1/9/2023 0809 by Chandrika Mena RN  Outcome: Progressing  1/9/2023 0233 by Corinna Centeno RN  Outcome: Progressing     Problem: Safety - Adult  Goal: Free from fall injury  1/9/2023 0809 by Chandrika Mena RN  Outcome: Progressing  1/9/2023 0233 by Corinna Centeno RN  Outcome: Progressing  Flowsheets (Taken 1/9/2023 0231)  Free From Fall Injury: Instruct family/caregiver on patient safety

## 2023-01-09 NOTE — PROGRESS NOTES
ACUTE OCCUPATIONAL THERAPY GOALS:   (Developed with and agreed upon by patient and/or caregiver.)  1. Patient will maintain TTWB status in RLE for 100% of treatment session and MN verbal cues from therapist.   2. Patient will complete functional transfers with MIN A while maintaining TTWB status in RLE and with adaptive equipment as needed. 3. Patient will complete lower body bathing and dressing with MIN A and adaptive equipment as needed. 4. Patient will complete toileting and toilet transfer with MIN A.   5. Patient will tolerate 25 minutes of OT treatment with 1-2 rest breaks to increase activity tolerance for ADLs. 6. Patient will participate in at least 10 minutes of BUE therapeutic exercises to strengthen BUE for functional transfers. OCCUPATIONAL THERAPY: Daily Note AM   OT Visit Days: 3   Time In/Out  OT Charge Capture  Rehab Caseload Tracker  OT Orders    Rachael Jennings is a 80 y.o. female   PRIMARY DIAGNOSIS: Periprosthetic hip fracture, initial encounter  Fall, initial encounter [W19. XXXA]  [facility] [S72.001A]  Periprosthetic hip fracture, initial encounter [Z14. Rosea Cave       Inpatient: Payor: MEDICARE / Plan: MEDICARE PART A AND B / Product Type: *No Product type* /     ASSESSMENT:     REHAB RECOMMENDATIONS: CURRENT LEVEL OF FUNCTION:  (Most Recently Demonstrated)   Recommendation to date pending progress:  Setting:  Short-term Rehab    Equipment:    To Be Determined Bathing:  Not Tested  Dressing:  Not Tested  Feeding/Grooming:  Not Tested  Toileting:  Stand by Assist  Functional Mobility:  Minimal Assist x 2     ASSESSMENT:  Ms. Ronaldo Reeves was supine in bed upon arrival. Pt completed bed mobility with min A X 2. Pt completed functional mobility with min A X 2. Pt completed toileting on bed side toilet with SBA. Pt required verbal and tactile cues to remember to maintain TTWB status. Pt is Kiana and has short term memory loss.    Pt is progressing towards goals. Continue POC. SUBJECTIVE:     Ms. Pa Plascencia states, \"Who are you? \"     Social/Functional Lives With: Alone  Type of Home: House  Home Layout: One level  Home Equipment: Cane  Has the patient had two or more falls in the past year or any fall with injury in the past year?: Yes  Receives Help From: Family  ADL Assistance: Independent  Ambulation Assistance: Independent  Transfer Assistance: Independent    OBJECTIVE:     LINES / Hsieh Butter / AIRWAY: IV    RESTRICTIONS/PRECAUTIONS:  Restrictions/Precautions  Restrictions/Precautions: Weight Bearing  Lower Extremity Weight Bearing Restrictions  Right Lower Extremity Weight Bearing: Toe Touch Weight Bearing        PAIN: Lindaann Left / O2:   Pre Treatment: 0           Post Treatment: 0 Vitals          Oxygen        MOBILITY: I Mod I S SBA CGA Min Mod Max Total  NT x2 Comments:   Bed Mobility    Rolling [] [] [] [] [] [] [] [] [] [] []    Supine to Sit [] [] [] [] [] [x] [] [] [] [] [x]    Scooting [] [] [] [] [] [] [] [] [] [] []    Sit to Supine [] [] [] [] [] [] [] [] [] [] []    Transfers    Sit to Stand [] [] [] [] [] [x] [] [] [] [] [x]    Bed to Chair [] [] [] [] [] [x] [] [] [] [] [x]    Stand to Sit [] [] [] [] [] [x] [] [] [] [] [x]    Tub/Shower [] [] [] [] [] [] [] [] [] [] []     Toilet [] [] [] [] [] [] [] [] [] [] []      [] [] [] [] [] [] [] [] [] [] []    I=Independent, Mod I=Modified Independent, S=Supervision/Setup, SBA=Standby Assistance, CGA=Contact Guard Assistance, Min=Minimal Assistance, Mod=Moderate Assistance, Max=Maximal Assistance, Total=Total Assistance, NT=Not Tested    ACTIVITIES OF DAILY LIVING: I Mod I S SBA CGA Min Mod Max Total NT Comments   BASIC ADLs:              Upper Body   Bathing [] [] [] [] [] [] [] [] [] []    Lower Body Bathing [] [] [] [] [] [] [] [] [] []    Toileting [] [] [] [x] [] [] [] [] [] []    Upper Body Dressing [] [] [] [] [] [] [] [] [] []    Lower Body Dressing [] [] [] [] [] [] [] [] [] [] Feeding [] [] [] [] [] [] [] [] [] []    Grooming [] [] [] [] [] [] [] [] [] []    Personal Device Care [] [] [] [] [] [] [] [] [] []    Functional Mobility [] [] [] [] [] [x] [] [] [] [] RW X 2   I=Independent, Mod I=Modified Independent, S=Supervision/Setup, SBA=Standby Assistance, CGA=Contact Guard Assistance, Min=Minimal Assistance, Mod=Moderate Assistance, Max=Maximal Assistance, Total=Total Assistance, NT=Not Tested    BALANCE: Good Fair+ Fair Fair- Poor NT Comments   Sitting Static [] [] [x] [] [] []    Sitting Dynamic [] [] [] [] [] []              Standing Static [] [] [] [x] [] []    Standing Dynamic [] [] [] [] [] []        PLAN:     FREQUENCY/DURATION   OT Plan of Care: 5 times/week for duration of hospital stay or until stated goals are met, whichever comes first.    TREATMENT:     TREATMENT:   Co-Treatment PT/OT necessary due to patient's decreased overall endurance/tolerance levels, as well as need for high level skilled assistance to complete functional transfers/mobility and functional tasks  Self Care (23 minutes): Patient participated in toileting ADLs in unsupported sitting with minimal verbal cueing to increase independence and decrease assistance required. Patient also participated in energy conservation training to increase independence and decrease assistance required.      TREATMENT GRID:  N/A    AFTER TREATMENT PRECAUTIONS: Alarm Activated, Bed/Chair Locked, Call light within reach, Chair, Needs within reach, RN notified, and Visitors at bedside    INTERDISCIPLINARY COLLABORATION:  RN/ PCT, PT/ PTA, and OT/ MORGAN    EDUCATION:       TOTAL TREATMENT DURATION AND TIME:  Time In: 1036  Time Out: 2230 [facility]  Minutes: 1305 [facility], [facility]

## 2023-01-09 NOTE — PROGRESS NOTES
________________________________________________________________________________________________    ACUTE PHYSICAL THERAPY GOALS:   (Developed with and agreed upon by patient and/or caregiver.)     (1.)Ms. Gurjit Centeno will move from supine to sit and sit to supine , scoot up and down, and roll side to side in bed with SUPERVISION within 7 treatment day(s). (2.)Ms. Gurjit Centeno will transfer from bed to chair and chair to bed with MINIMAL ASSIST using the least restrictive device within 7 treatment day(s). (3.)Ms. Gurjit Centeno will ambulate with MINIMAL ASSIST for 50 feet with the least restrictive device within 7 treatment day(s). PHYSICAL THERAPY: Daily Note AM   (Link to Caseload Tracking: PT Visit Days : 3  Time In/Out PT Charge Capture  Rehab Caseload Tracker  Orders       TTWB RLE, no hip abduction    Partha Roland is a Hanna Melba [age] y.o. female   PRIMARY DIAGNOSIS: Periprosthetic hip fracture, initial encounter  Fall, initial encounter [W19. XXXA]  Closed right hip fracture, initial encounter ([facility] [address] [S72.001A]  Periprosthetic hip fracture, initial encounter [K00. Paul Mclean       Inpatient: Payor: MEDICARE / Plan: MEDICARE PART A AND B / Product Type: *No Product type* /     ASSESSMENT:     REHAB RECOMMENDATIONS:   Recommendation to date pending progress:  Setting:  Short-term Rehab    Equipment:    To Be Determined     ASSESSMENT:  Ms. Gurjit Centeno is supine in bed and willing to work with PT/OT this AM. She has notes in her room to assist her with her memory. Pt continues to ask several times throughout the session what is going on and what the plan is. Pt is able to move with min A x2 for bed mobility and for SPT to [facility] and then to chair. She continues to have a difficult time maintaining WB and is given verbal cues throughout to assist. Pt is more aware of her situation today and able to move with less assistance. PT will continue to follow. SUBJECTIVE:   Ms. Gurjit Centeno states, \"Did I meet you before? \" Social/Functional Lives With: Alone  Type of Home: House  Home Layout: One level  Home Equipment: Cane  Has the patient had two or more falls in the past year or any fall with injury in the past year?: Yes  Receives Help From: Family  ADL Assistance: Independent  Ambulation Assistance: Independent  Transfer Assistance: Independent  OBJECTIVE:     PAIN: Mara Muster / O2: PRECAUTION / Anyi Donovan / Cole Gunning:   Pre Treatment:   Pain Assessment: None - Denies Pain      Post Treatment: no number given Vitals        Oxygen    None    RESTRICTIONS/PRECAUTIONS:  Restrictions/Precautions  Restrictions/Precautions: Weight Bearing  Lower Extremity Weight Bearing Restrictions  Right Lower Extremity Weight Bearing: Toe Touch Weight Bearing  Restrictions/Precautions: Weight Bearing     MOBILITY: I Mod I S SBA CGA Min Mod Max Total  NT x2 Comments:   Bed Mobility    Rolling [] [] [] [] [] [] [] [] [] [x] []    Supine to Sit [] [] [] [] [] [x] [] [] [] [] [x]    Scooting [] [] [] [] [] [] [] [] [] [x] []    Sit to Supine [] [] [] [] [] [] [] [] [] [x] []    Transfers    Sit to Stand [] [] [] [] [] [x] [] [] [] [] [x]    Bed to Chair [] [] [] [] [] [x] [] [] [] [] [x] SPT with RW   Stand to Sit [] [] [] [] [] [x] [] [] [] [] [x]     [] [] [] [] [] [] [] [] [] [] []    I=Independent, Mod I=Modified Independent, S=Supervision, SBA=Standby Assistance, CGA=Contact Guard Assistance,   Min=Minimal Assistance, Mod=Moderate Assistance, Max=Maximal Assistance, Total=Total Assistance, NT=Not Tested    BALANCE: Good Fair+ Fair Fair- Poor NT Comments   Sitting Static [x] [x] [] [] [] []    Sitting Dynamic [] [x] [] [] [] []              Standing Static [] [] [] [x] [] []    Standing Dynamic [] [] [] [x] [x] []      GAIT: I Mod I S SBA CGA Min Mod Max Total  NT x2 Comments:   Level of Assistance [] [] [] [] [] [] [] [] [] [x] []    Distance   feet    DME N/A    Gait Quality N/A    Weightbearing Status Right Lower Extremity Weight Bearing: Toe Touch Weight Bearing    Stairs      I=Independent, Mod I=Modified Independent, S=Supervision, SBA=Standby Assistance, CGA=Contact Guard Assistance,   Min=Minimal Assistance, Mod=Moderate Assistance, Max=Maximal Assistance, Total=Total Assistance, NT=Not Tested    PLAN:   FREQUENCY AND DURATION: Daily for duration of hospital stay or until stated goals are met, whichever comes first.    TREATMENT:   TREATMENT:   Co-Treatment PT/OT necessary due to patient's decreased overall endurance/tolerance levels, as well as need for high level skilled assistance to complete functional transfers/mobility and functional tasks  Therapeutic Activity (23 Minutes): Therapeutic activity included Supine to Sit, Scooting, Transfer Training, Ambulation on level ground, Sitting balance , and Standing balance to improve functional Activity tolerance, Balance, Mobility, Strength, and ROM.     TREATMENT GRID:   Date:  1/8/23 Date:   Date:     Activity/Exercise Parameters Parameters Parameters   APs 20x     LAQ 10x                                       AFTER TREATMENT PRECAUTIONS: Alarm Activated, Bed/Chair Locked, Call light within reach, Chair, Needs within reach, and RN notified    INTERDISCIPLINARY COLLABORATION:  RN/ PCT, PT/ PTA, and OT/ MORGAN    EDUCATION:      TIME IN/OUT:  Time In: 1036  Time Out: 1722 [facility]  Minutes: 809 [facility]  [address], PT

## 2023-01-09 NOTE — PLAN OF CARE
Problem: Pain  Goal: Verbalizes/displays adequate comfort level or baseline comfort level  Outcome: Progressing     Problem: Discharge Planning  Goal: Discharge to home or other facility with appropriate resources  Outcome: Progressing  Flowsheets (Taken 1/8/2023 1940)  Discharge to home or other facility with appropriate resources: Identify barriers to discharge with patient and caregiver     Problem: Skin/Tissue Integrity  Goal: Absence of new skin breakdown  Description: 1. Monitor for areas of redness and/or skin breakdown  2. Assess vascular access sites hourly  3. Every 4-6 hours minimum:  Change oxygen saturation probe site  4. Every 4-6 hours:  If on nasal continuous positive airway pressure, respiratory therapy assess nares and determine need for appliance change or resting period.   Outcome: Progressing     Problem: Safety - Adult  Goal: Free from fall injury  Outcome: Progressing  Flowsheets (Taken 1/9/2023 0231)  Free From Fall Injury: Instruct family/caregiver on patient safety     Problem: ABCDS Injury Assessment  Goal: Absence of physical injury  Outcome: Progressing  Flowsheets (Taken 1/9/2023 0231)  Absence of Physical Injury: Implement safety measures based on patient assessment     Problem: Neurosensory - Adult  Goal: Achieves maximal functionality and self care  Outcome: Progressing  Flowsheets (Taken 1/8/2023 1940)  Achieves maximal functionality and self care: Encourage and assist patient to increase activity and self care with guidance from physical therapy/occupational therapy     Problem: Respiratory - Adult  Goal: Achieves optimal ventilation and oxygenation  Outcome: Progressing  Flowsheets (Taken 1/8/2023 1940)  Achieves optimal ventilation and oxygenation: Assess for changes in respiratory status     Problem: Cardiovascular - Adult  Goal: Maintains optimal cardiac output and hemodynamic stability  Outcome: Progressing  Flowsheets (Taken 1/8/2023 1940)  Maintains optimal cardiac output and hemodynamic stability: Monitor blood pressure and heart rate     Problem: Skin/Tissue Integrity - Adult  Goal: Skin integrity remains intact  Outcome: Progressing  Flowsheets  Taken 1/9/2023 0231  Skin Integrity Remains Intact: Monitor for areas of redness and/or skin breakdown  Taken 1/8/2023 1940  Skin Integrity Remains Intact: Monitor for areas of redness and/or skin breakdown  Goal: Incisions, wounds, or drain sites healing without S/S of infection  Outcome: Progressing  Flowsheets  Taken 1/9/2023 0231  Incisions, Wounds, or Drain Sites Healing Without Sign and Symptoms of Infection: ADMISSION and DAILY: Assess and document risk factors for pressure ulcer development  Taken 1/8/2023 1940  Incisions, Wounds, or Drain Sites Healing Without Sign and Symptoms of Infection: ADMISSION and DAILY: Assess and document risk factors for pressure ulcer development     Problem: Musculoskeletal - Adult  Goal: Return mobility to safest level of function  Outcome: Progressing  Flowsheets (Taken 1/8/2023 1940)  Return Mobility to Safest Level of Function: Assess patient stability and activity tolerance for standing, transferring and ambulating with or without assistive devices  Goal: Maintain proper alignment of affected body part  Outcome: Progressing  Flowsheets (Taken 1/8/2023 1940)  Maintain proper alignment of affected body part: Support and protect limb and body alignment per provider's orders  Goal: Return ADL status to a safe level of function  Outcome: Progressing  Flowsheets (Taken 1/8/2023 1940)  Return ADL Status to a Safe Level of Function: Administer medication as ordered     Problem: Gastrointestinal - Adult  Goal: Maintains adequate nutritional intake  Outcome: Progressing  Flowsheets (Taken 1/8/2023 1940)  Maintains adequate nutritional intake: Monitor percentage of each meal consumed     Problem: Genitourinary - Adult  Goal: Absence of urinary retention  Outcome: Progressing  Flowsheets (Taken 1/8/2023 1940)  Absence of urinary retention: Assess patients ability to void and empty bladder     Problem: Infection - Adult  Goal: Absence of infection at discharge  Outcome: Progressing  Flowsheets (Taken 1/8/2023 1940)  Absence of infection at discharge: Assess and monitor for signs and symptoms of infection  Goal: Absence of infection during hospitalization  Outcome: Progressing  Flowsheets (Taken 1/8/2023 1940)  Absence of infection during hospitalization: Assess and monitor for signs and symptoms of infection     Problem: Metabolic/Fluid and Electrolytes - Adult  Goal: Electrolytes maintained within normal limits  Outcome: Progressing  Flowsheets (Taken 1/8/2023 1940)  Electrolytes maintained within normal limits: Monitor labs and assess patient for signs and symptoms of electrolyte imbalances  Goal: Hemodynamic stability and optimal renal function maintained  Outcome: Progressing  Flowsheets (Taken 1/8/2023 1940)  Hemodynamic stability and optimal renal function maintained: Monitor labs and assess for signs and symptoms of volume excess or deficit

## 2023-01-10 LAB
ALBUMIN SERPL-MCNC: 2.8 G/DL (ref 3.2–4.6)
ALBUMIN/GLOB SERPL: 0.7 (ref 0.4–1.6)
ALP SERPL-CCNC: 81 U/L (ref 50–136)
ALT SERPL-CCNC: 17 U/L (ref 12–65)
ANION GAP SERPL CALC-SCNC: 7 MMOL/L (ref 2–11)
AST SERPL-CCNC: 30 U/L (ref 15–37)
BASOPHILS # BLD: 0 K/UL (ref 0–0.2)
BASOPHILS NFR BLD: 0 % (ref 0–2)
BILIRUB SERPL-MCNC: 1.4 MG/DL (ref 0.2–1.1)
BUN SERPL-MCNC: 33 MG/DL (ref 8–23)
CALCIUM SERPL-MCNC: 9.1 MG/DL (ref 8.3–10.4)
CHLORIDE SERPL-SCNC: 101 MMOL/L (ref 101–110)
CO2 SERPL-SCNC: 28 MMOL/L (ref 21–32)
CREAT SERPL-MCNC: 0.8 MG/DL (ref 0.6–1)
DIFFERENTIAL METHOD BLD: ABNORMAL
EOSINOPHIL # BLD: 0.1 K/UL (ref 0–0.8)
EOSINOPHIL NFR BLD: 2 % (ref 0.5–7.8)
ERYTHROCYTE [DISTWIDTH] IN BLOOD BY AUTOMATED COUNT: 14.4 % (ref 11.9–14.6)
GLOBULIN SER CALC-MCNC: 3.9 G/DL (ref 2.8–4.5)
GLUCOSE SERPL-MCNC: 95 MG/DL (ref 65–100)
HCT VFR BLD AUTO: 27.1 % (ref 35.8–46.3)
HGB BLD-MCNC: 9 G/DL (ref 11.7–15.4)
IMM GRANULOCYTES # BLD AUTO: 0 K/UL (ref 0–0.5)
IMM GRANULOCYTES NFR BLD AUTO: 0 % (ref 0–5)
LYMPHOCYTES # BLD: 1 K/UL (ref 0.5–4.6)
LYMPHOCYTES NFR BLD: 15 % (ref 13–44)
MCH RBC QN AUTO: 31.9 PG (ref 26.1–32.9)
MCHC RBC AUTO-ENTMCNC: 33.2 G/DL (ref 31.4–35)
MCV RBC AUTO: 96.1 FL (ref 82–102)
MM INDURATION, POC: 0 MM (ref 0–5)
MONOCYTES # BLD: 0.8 K/UL (ref 0.1–1.3)
MONOCYTES NFR BLD: 12 % (ref 4–12)
NEUTS SEG # BLD: 4.9 K/UL (ref 1.7–8.2)
NEUTS SEG NFR BLD: 71 % (ref 43–78)
NRBC # BLD: 0 K/UL (ref 0–0.2)
PLATELET # BLD AUTO: 188 K/UL (ref 150–450)
PMV BLD AUTO: 11.8 FL (ref 9.4–12.3)
POTASSIUM SERPL-SCNC: 4.1 MMOL/L (ref 3.5–5.1)
PPD, POC: NEGATIVE
PROT SERPL-MCNC: 6.7 G/DL (ref 6.3–8.2)
RBC # BLD AUTO: 2.82 M/UL (ref 4.05–5.2)
SARS-COV-2 RDRP RESP QL NAA+PROBE: NOT DETECTED
SODIUM SERPL-SCNC: 136 MMOL/L (ref 133–143)
SOURCE: NORMAL
WBC # BLD AUTO: 6.9 K/UL (ref 4.3–11.1)

## 2023-01-10 PROCEDURE — 80053 COMPREHEN METABOLIC PANEL: CPT

## 2023-01-10 PROCEDURE — 97112 NEUROMUSCULAR REEDUCATION: CPT

## 2023-01-10 PROCEDURE — 87635 SARS-COV-2 COVID-19 AMP PRB: CPT

## 2023-01-10 PROCEDURE — 85025 COMPLETE CBC W/AUTO DIFF WBC: CPT

## 2023-01-10 PROCEDURE — 6370000000 HC RX 637 (ALT 250 FOR IP): Performed by: FAMILY MEDICINE

## 2023-01-10 PROCEDURE — 97530 THERAPEUTIC ACTIVITIES: CPT

## 2023-01-10 PROCEDURE — 2580000003 HC RX 258: Performed by: FAMILY MEDICINE

## 2023-01-10 PROCEDURE — 6370000000 HC RX 637 (ALT 250 FOR IP): Performed by: NURSE PRACTITIONER

## 2023-01-10 PROCEDURE — 6360000002 HC RX W HCPCS: Performed by: PHYSICIAN ASSISTANT

## 2023-01-10 PROCEDURE — 97535 SELF CARE MNGMENT TRAINING: CPT

## 2023-01-10 PROCEDURE — 1100000000 HC RM PRIVATE

## 2023-01-10 PROCEDURE — 36415 COLL VENOUS BLD VENIPUNCTURE: CPT

## 2023-01-10 RX ADMIN — LISINOPRIL 20 MG: 20 TABLET ORAL at 10:22

## 2023-01-10 RX ADMIN — ENOXAPARIN SODIUM 30 MG: 100 INJECTION SUBCUTANEOUS at 10:21

## 2023-01-10 RX ADMIN — SODIUM CHLORIDE, PRESERVATIVE FREE 5 ML: 5 INJECTION INTRAVENOUS at 11:03

## 2023-01-10 RX ADMIN — ESCITALOPRAM OXALATE 10 MG: 10 TABLET ORAL at 10:22

## 2023-01-10 RX ADMIN — SODIUM CHLORIDE, PRESERVATIVE FREE 5 ML: 5 INJECTION INTRAVENOUS at 19:57

## 2023-01-10 RX ADMIN — TRAMADOL HYDROCHLORIDE 50 MG: 50 TABLET ORAL at 10:22

## 2023-01-10 ASSESSMENT — PAIN DESCRIPTION - LOCATION: LOCATION: HIP

## 2023-01-10 ASSESSMENT — PAIN SCALES - GENERAL
PAINLEVEL_OUTOF10: 0
PAINLEVEL_OUTOF10: 7

## 2023-01-10 NOTE — CARE COORDINATION
CM reviewed chart for anticipated discharge needs. No CM consult received; see CM assessment below. Patient admitted 1/5/2023 with per-prosthetic hip fracture. Current therapy recommendations are for STR at SNF. CM in to see patient who is resting quietly. As STR bed availability is extremely limited at this time. CM sent referrals to [facility] to confirm bed availability. Patient is listed as insured with traditional Medicare which would not require insurance authorization for admission. 48 hour PPD was completed on 1/9 and Rapid Covid testing will be ordered for tomorrow AM.  Once bed offers confirmed, CM will discuss offers with patient and health care decision maker. Pending bed availability, patient/family agreement for STR/facility and medical readiness, patient can discharge to STR at SNF.         01/09/23 1600   Service Assessment   Patient Orientation Alert and Oriented;Person;Place   Cognition Short Term Memory Deficit   History Provided By Patient;Medical Record   Primary Caregiver Self   Accompanied By/Relationship N/A   Support Systems Family Members   Patient's Healthcare Decision Maker is: Legal Next of [place] [id number]   PCP Verified by CM Yes   Prior Functional Level Assistance with the following:;Shopping   Current Functional Level Assistance with the following:;Bathing;Dressing;Cooking; Toileting;Mobility; Shopping;Housework   Can patient return to prior living arrangement No   Ability to make needs known: Good   Family able to assist with home care needs: No   Would you like for me to discuss the discharge plan with any other family members/significant others, and if so, who? Yes  (Emergency Contact)   Financial Resources Medicare   Community Resources None   CM/SW Referral Other (see comment)  (No CM consult received)   Discharge [address] [facility]   Patient expects to be discharged to: Skilled nursing facility   History of falls?  1   Services At/After Discharge   Transition of Care Consult (CM Consult) SNF   Mode of Transport at Discharge BLS   Confirm Follow Up Transport Family

## 2023-01-10 NOTE — PLAN OF CARE
Problem: Pain  Goal: Verbalizes/displays adequate comfort level or baseline comfort level  Outcome: Progressing     Problem: Discharge Planning  Goal: Discharge to home or other facility with appropriate resources  Outcome: Progressing     Problem: Skin/Tissue Integrity  Goal: Absence of new skin breakdown  Description: 1. Monitor for areas of redness and/or skin breakdown  2. Assess vascular access sites hourly  3. Every 4-6 hours minimum:  Change oxygen saturation probe site  4. Every 4-6 hours:  If on nasal continuous positive airway pressure, respiratory therapy assess nares and determine need for appliance change or resting period.   Outcome: Progressing     Problem: Safety - Adult  Goal: Free from fall injury  Outcome: Progressing  Flowsheets (Taken 1/10/2023 0258 by Laina Galvez RN)  Free From Fall Injury: Instruct family/caregiver on patient safety     Problem: ABCDS Injury Assessment  Goal: Absence of physical injury  Outcome: Progressing  Flowsheets (Taken 1/10/2023 0258 by Laina Galvez RN)  Absence of Physical Injury: Implement safety measures based on patient assessment     Problem: Neurosensory - Adult  Goal: Achieves maximal functionality and self care  Outcome: Progressing     Problem: Respiratory - Adult  Goal: Achieves optimal ventilation and oxygenation  Outcome: Progressing     Problem: Cardiovascular - Adult  Goal: Maintains optimal cardiac output and hemodynamic stability  Outcome: Progressing     Problem: Skin/Tissue Integrity - Adult  Goal: Skin integrity remains intact  Outcome: Progressing  Flowsheets (Taken 1/10/2023 0258 by Laina Galvez RN)  Skin Integrity Remains Intact: Monitor for areas of redness and/or skin breakdown  Goal: Incisions, wounds, or drain sites healing without S/S of infection  Outcome: Progressing  Flowsheets (Taken 1/10/2023 0258 by Laina Galvez RN)  Incisions, Wounds, or Drain Sites Healing Without Sign and Symptoms of Infection: ADMISSION and DAILY: Assess and document risk factors for pressure ulcer development     Problem: Musculoskeletal - Adult  Goal: Return mobility to safest level of function  Outcome: Progressing  Goal: Maintain proper alignment of affected body part  Outcome: Progressing  Goal: Return ADL status to a safe level of function  Outcome: Progressing     Problem: Gastrointestinal - Adult  Goal: Maintains adequate nutritional intake  Outcome: Progressing     Problem: Genitourinary - Adult  Goal: Absence of urinary retention  Outcome: Progressing     Problem: Infection - Adult  Goal: Absence of infection at discharge  Outcome: Progressing  Goal: Absence of infection during hospitalization  Outcome: Progressing     Problem: Metabolic/Fluid and Electrolytes - Adult  Goal: Electrolytes maintained within normal limits  Outcome: Progressing  Goal: Hemodynamic stability and optimal renal function maintained  Outcome: Progressing

## 2023-01-10 NOTE — PROGRESS NOTES
ACUTE OCCUPATIONAL THERAPY GOALS:   (Developed with and agreed upon by patient and/or caregiver.)  1. Patient will maintain TTWB status in RLE for 100% of treatment session and MN verbal cues from therapist.   2. Patient will complete functional transfers with MIN A while maintaining TTWB status in RLE and with adaptive equipment as needed. 3. Patient will complete lower body bathing and dressing with MIN A and adaptive equipment as needed. 4. Patient will complete toileting and toilet transfer with MIN A.   5. Patient will tolerate 25 minutes of OT treatment with 1-2 rest breaks to increase activity tolerance for ADLs. 6. Patient will participate in at least 10 minutes of BUE therapeutic exercises to strengthen BUE for functional transfers. OCCUPATIONAL THERAPY: Daily Note AM   OT Visit Days: 4   Time In/Out  OT Charge Capture  Rehab Caseload Tracker  OT Orders    Hansa Setting is a 80 y.o. female   PRIMARY DIAGNOSIS: Periprosthetic hip fracture, initial encounter  Fall, initial encounter [W19. XXXA]  [facility] [S72.001A]  Periprosthetic hip fracture, initial encounter [C43. Soumya Heck       Inpatient: Payor: MEDICARE / Plan: MEDICARE PART A AND B / Product Type: *No Product type* /     ASSESSMENT:     REHAB RECOMMENDATIONS: CURRENT LEVEL OF FUNCTION:  (Most Recently Demonstrated)   Recommendation to date pending progress:  Setting:  Short-term Rehab    Equipment:    To Be Determined Bathing:  Not Tested  Dressing:  Not Tested  Feeding/Grooming:  Stand by Assist  Toileting:  Stand by Assist  Functional Mobility:  Minimal Assist x 2     ASSESSMENT:  Ms. Francheska Ramos is doing fair today. Pt was able to perform bed mobility with min Ax2. Pt demonstrates fair sitting balance at EOB today. Pt was able to stand and transfer with min A x2. Fair standing balance noted during mobility and transfers.  Pt was able to perform grooming at the sink with SBA and then later performed toileting with same amount of assistance. Pt making progress with goals. Will continue to benefit from skilled OT during stay.        SUBJECTIVE:     Ms. Tammie Tavarez states, \"I am cold\"     Social/Functional Lives With: Alone  Type of Home: House  Home Layout: One level  Home Equipment: [employer]  Has the patient had two or more falls in the past year or any fall with injury in the past year?: Yes  Receives Help From: Family  ADL Assistance: Independent  Ambulation Assistance: Independent  Transfer Assistance: Independent    OBJECTIVE:     Bairon John / Dennise Chaparro / Savi Mendoza: PAT    RESTRICTIONS/PRECAUTIONS:  Restrictions/Precautions  Restrictions/Precautions: Weight Bearing  Lower Extremity Weight Bearing Restrictions  Right Lower Extremity Weight Bearing: Toe Touch Weight Bearing        PAIN: VITALS / O2:   Pre Treatment: 0           Post Treatment: 0 Vitals          Oxygen        MOBILITY: I Mod I S SBA CGA Min Mod Max Total  NT x2 Comments:   Bed Mobility    Rolling [] [] [] [] [] [] [] [] [] [] []    Supine to Sit [] [] [] [] [] [x] [] [] [] [] [x]    Scooting [] [] [] [] [] [x] [] [] [] [] []    Sit to Supine [] [] [] [] [] [] [] [] [] [x] []    Transfers    Sit to Stand [] [] [] [] [] [x] [] [] [] [] [x]    Bed to Chair [] [] [] [] [] [x] [] [] [] [] [x]    Stand to Sit [] [] [] [] [] [x] [] [] [] [] [x]    Tub/Shower [] [] [] [] [] [] [] [] [] [x] []     Toilet [] [] [] [] [] [x] [] [] [] [] [x]      [] [] [] [] [] [] [] [] [] [] []    I=Independent, Mod I=Modified Independent, S=Supervision/Setup, SBA=Standby Assistance, CGA=Contact Guard Assistance, Min=Minimal Assistance, Mod=Moderate Assistance, Max=Maximal Assistance, Total=Total Assistance, NT=Not Tested    ACTIVITIES OF DAILY LIVING: I Mod I S SBA CGA Min Mod Max Total NT Comments   BASIC ADLs:              Upper Body   Bathing [] [] [] [] [] [] [] [] [] [x]    Lower Body Bathing [] [] [] [] [] [] [] [] [] [x]    Toileting [] [] [] [x] [] [] [] [] [] []    Upper Body Dressing [] [] [] [] [] [] [] [] [] [x]    Lower Body Dressing [] [] [] [] [] [] [] [] [] [x]    Feeding [] [] [] [] [] [] [] [] [] [x]    Grooming [] [] [] [] [] [] [] [] [] [x]    Personal Device Care [] [] [] [] [] [] [] [] [] [x]    Functional Mobility [] [] [] [] [] [x] [] [] [] [] RW X 2   I=Independent, Mod I=Modified Independent, S=Supervision/Setup, SBA=Standby Assistance, CGA=Contact Guard Assistance, Min=Minimal Assistance, Mod=Moderate Assistance, Max=Maximal Assistance, Total=Total Assistance, NT=Not Tested    BALANCE: Good Fair+ Fair Fair- Poor NT Comments   Sitting Static [] [x] [] [] [] []    Sitting Dynamic [] [] [x] [] [] []              Standing Static [] [] [] [x] [] []    Standing Dynamic [] [] [] [x] [] []        PLAN:     FREQUENCY/DURATION   OT Plan of Care: 5 times/week for duration of hospital stay or until stated goals are met, whichever comes first.    TREATMENT:     TREATMENT:   Co-Treatment PT/OT necessary due to patient's decreased overall endurance/tolerance levels, as well as need for high level skilled assistance to complete functional transfers/mobility and functional tasks  Neuromuscular Re-education (30 Minutes): Patient participated in neuromuscular re-education including functional reaching, weight shifting, postural training, midline training, standing tolerance activity , and sitting balance activity   with minimal assistance, verbal cues, and tactile cues to improve sitting balance, standing balance, posture, and coordination in order to prepare for functional task, prepare for seated ADLs, prepare for standing ADLs, and prepare for functional transfer. Self Care (23 minutes): Patient participated in toileting and grooming ADLs in supported sitting with minimal verbal and tactile cueing to increase independence, decrease assistance required, and increase activity tolerance.  Patient also participated in functional transfer training to increase independence, decrease assistance required, and increase activity tolerance.      TREATMENT GRID:  N/A    AFTER TREATMENT PRECAUTIONS: Alarm Activated, Bed/Chair Locked, Call light within reach, Chair, and Needs within reach    INTERDISCIPLINARY COLLABORATION:  RN/ PCT, PT/ PTA, and OT/ MORGAN    EDUCATION:       TOTAL TREATMENT DURATION AND TIME:  Time In: 0930  Time Out: [id number] Kurt Matthews  Minutes: 50    CRIS Tovar

## 2023-01-10 NOTE — PROGRESS NOTES
Hospitalist Progress Note   Admit Date:  2023  4:59 PM   Name:  Masha Garcia   Age:  Hanna Reilly [age] y.o. Sex:  female  :  1922   MRN:  [id number]   Room:  [facility]/    Presenting Complaint: Fall     Reason(s) for Admission: Fall, initial encounter [W19. XXXA]  Closed right [facility] fracture, [facility] [S72.001A]  Periprosthetic hip fracture, initial encounter [Y93. [place] Course:   Hanna Reilly [age] y.o. female with a PMH of hypertension, dementia, anxiety who presented to the ER after an unwitnessed fall. Patient lives at home by herself. Found by sister-in-law. Patient does not remember what happened. Denies loss of consciousness. Patient complaining of hip pain since fall. No other complaint. Not on any blood thinner. Labs notable for mild leukocytosis, otherwise unremarkable. XR hip showed periprosthetic fracture of the right proximal femur. CT head and CT cervical spine negative for fracture. Hospitalist consulted for admission and Ortho surgery was consulted. They will treat non-operatively. The patient will need rehab at discharge. Subjective & 24hr Events (01/10/23): Patient was seen and examined at the bedside. No overnight events. Patient resting comfortably in bed without any major complaints. Waiting approval for short-term rehab. Assessment & Plan:   Acute periprosthetic fracture of right proximal femur  - Plain films reviewed  - Ortho consulted  - Ortho will treat nonoperatively  - PT/OT recommending short-term rehab  -continue pain control    Leukocytosis  - Likely reactive  - Resolved  - UA negative  - Chest x-ray without acute abnormality    Hypertension  - Continue home lisinopril  - As needed hydralazine    Mood disorder  - Continue Lexapro        Anticipated discharge needs:    Dispo pending clinical course. PT recommending short-term rehab    Diet:  ADULT DIET;  Regular  ADULT ORAL NUTRITION SUPPLEMENT; Breakfast, Lunch, Dinner; Standard High Calorie/High Protein Oral Supplement  DVT PPx: Lovenox  Code status: DNR    Hospital Problems:  Principal Problem:    Periprosthetic hip fracture, initial encounter  Resolved Problems:    * No resolved hospital problems. *      Objective:   Patient Vitals for the past 24 hrs:   Temp Pulse Resp BP SpO2   01/10/23 1114 -- 68 -- (!) 119/49 --   01/10/23 1112 98.2 °F (36.8 °C) 72 17 (!) 109/44 95 %   01/10/23 1052 -- -- 16 -- --   01/10/23 0722 97.7 °F (36.5 °C) 67 17 (!) 147/59 95 %   01/10/23 0336 98.1 °F (36.7 °C) 75 17 (!) 152/63 93 %   01/09/23 2312 97.7 °F (36.5 °C) 74 16 128/63 93 %   01/09/23 1943 97.9 °F (36.6 °C) 70 17 (!) 122/52 94 %   01/09/23 1616 97.5 °F (36.4 °C) 59 18 (!) 123/44 93 %       Oxygen Therapy  SpO2: 95 %  Pulse Oximeter Device Mode: Continuous  O2 Device: None (Room air)    Estimated body mass index is 21.14 kg/m² as calculated from the following:    Height as of this encounter: 5' 7\" (1.702 m). Weight as of this encounter: 135 lb (61.2 kg). No intake or output data in the 24 hours ending 01/10/23 1438        Physical Exam:   Blood pressure (!) 119/49, pulse 68, temperature 98.2 °F (36.8 °C), temperature source Oral, resp. rate 17, height 5' 7\" (1.702 m), weight 135 lb (61.2 kg), SpO2 95 %. General:    Well nourished. Head:  Normocephalic, atraumatic  Eyes:  Sclerae appear normal.  Pupils equally round. ENT:  Nares appear normal.  Moist oral mucosa  Neck:  No restricted ROM. Trachea midline   CV:   RRR. No m/r/g. No jugular venous distension. Lungs:   CTAB. No wheezing, rhonchi, or rales. Symmetric expansion. Abdomen:   Soft, nontender, nondistended. Extremities: No cyanosis or clubbing. No edema  Skin:     No rashes and normal coloration. Warm and dry. Neuro:  CN II-XII grossly intact. Sensation intact. Psych:  Normal mood and affect.       I have personally reviewed labs and tests showing:  Recent Labs:  Recent Results (from the past 48 hour(s))   PLEASE READ & DOCUMENT PPD TEST IN 24 HRS    Collection Time: 01/08/23  4:15 PM   Result Value Ref Range    PPD, (POC) Negative Negative    mm Induration 0 0 - 5 mm   PLEASE READ & DOCUMENT PPD TEST IN 72 HRS    Collection Time: 01/09/23 12:00 AM   Result Value Ref Range    PPD, (POC) Negative Negative    mm Induration 0 0 - 5 mm   Comprehensive Metabolic Panel w/ Reflex to MG    Collection Time: 01/09/23  9:45 AM   Result Value Ref Range    Sodium 134 133 - 143 mmol/L    Potassium 4.1 3.5 - 5.1 mmol/L    Chloride 100 (L) 101 - 110 mmol/L    CO2 30 21 - 32 mmol/L    Anion Gap 4 2 - 11 mmol/L    Glucose 109 (H) 65 - 100 mg/dL    BUN 29 (H) 8 - 23 MG/DL    Creatinine 0.80 0.6 - 1.0 MG/DL    Est, Glom Filt Rate >60 >60 ml/min/1.73m2    Calcium 8.9 8.3 - 10.4 MG/DL    Total Bilirubin 1.4 (H) 0.2 - 1.1 MG/DL    ALT 16 12 - 65 U/L    AST 27 15 - 37 U/L    Alk Phosphatase 79 50 - 136 U/L    Total Protein 6.7 6.3 - 8.2 g/dL    Albumin 3.0 (L) 3.2 - 4.6 g/dL    Globulin 3.7 2.8 - 4.5 g/dL    Albumin/Globulin Ratio 0.8 0.4 - 1.6     CBC with Auto Differential    Collection Time: 01/09/23  9:45 AM   Result Value Ref Range    WBC 7.7 4.3 - 11.1 K/uL    RBC 2.88 (L) 4.05 - 5.2 M/uL    Hemoglobin 9.2 (L) 11.7 - 15.4 g/dL    Hematocrit 28.2 (L) 35.8 - 46.3 %    MCV 97.9 82 - 102 FL    MCH 31.9 26.1 - 32.9 PG    MCHC 32.6 31.4 - 35.0 g/dL    RDW 14.3 11.9 - 14.6 %    Platelets 915 628 - 040 K/uL    MPV 11.4 9.4 - 12.3 FL    nRBC 0.00 0.0 - 0.2 K/uL    Differential Type AUTOMATED      Seg Neutrophils 74 43 - 78 %    Lymphocytes 12 (L) 13 - 44 %    Monocytes 12 4.0 - 12.0 %    Eosinophils % 1 0.5 - 7.8 %    Basophils 1 0.0 - 2.0 %    Immature Granulocytes 0 0.0 - 5.0 %    Segs Absolute 5.7 1.7 - 8.2 K/UL    Absolute Lymph # 0.9 0.5 - 4.6 K/UL    Absolute Mono # 0.9 0.1 - 1.3 K/UL    Absolute Eos # 0.1 0.0 - 0.8 K/UL    Basophils Absolute 0.1 0.0 - 0.2 K/UL    Absolute Immature Granulocyte 0.0 0.0 - 0.5 K/UL   Comprehensive Metabolic Panel w/ Reflex to MG    Collection Time: 01/10/23  5:43 AM   Result Value Ref Range    Sodium 136 133 - 143 mmol/L    Potassium 4.1 3.5 - 5.1 mmol/L    Chloride 101 101 - 110 mmol/L    CO2 28 21 - 32 mmol/L    Anion Gap 7 2 - 11 mmol/L    Glucose 95 65 - 100 mg/dL    BUN 33 (H) 8 - 23 MG/DL    Creatinine 0.80 0.6 - 1.0 MG/DL    Est, Glom Filt Rate >60 >60 ml/min/1.73m2    Calcium 9.1 8.3 - 10.4 MG/DL    Total Bilirubin 1.4 (H) 0.2 - 1.1 MG/DL    ALT 17 12 - 65 U/L    AST 30 15 - 37 U/L    Alk Phosphatase 81 50 - 136 U/L    Total Protein 6.7 6.3 - 8.2 g/dL    Albumin 2.8 (L) 3.2 - 4.6 g/dL    Globulin 3.9 2.8 - 4.5 g/dL    Albumin/Globulin Ratio 0.7 0.4 - 1.6     CBC with Auto Differential    Collection Time: 01/10/23  5:43 AM   Result Value Ref Range    WBC 6.9 4.3 - 11.1 K/uL    RBC 2.82 (L) 4.05 - 5.2 M/uL    Hemoglobin 9.0 (L) 11.7 - 15.4 g/dL    Hematocrit 27.1 (L) 35.8 - 46.3 %    MCV 96.1 82 - 102 FL    MCH 31.9 26.1 - 32.9 PG    MCHC 33.2 31.4 - 35.0 g/dL    RDW 14.4 11.9 - 14.6 %    Platelets 655 810 - 395 K/uL    MPV 11.8 9.4 - 12.3 FL    nRBC 0.00 0.0 - 0.2 K/uL    Differential Type AUTOMATED      Seg Neutrophils 71 43 - 78 %    Lymphocytes 15 13 - 44 %    Monocytes 12 4.0 - 12.0 %    Eosinophils % 2 0.5 - 7.8 %    Basophils 0 0.0 - 2.0 %    Immature Granulocytes 0 0.0 - 5.0 %    Segs Absolute 4.9 1.7 - 8.2 K/UL    Absolute Lymph # 1.0 0.5 - 4.6 K/UL    Absolute Mono # 0.8 0.1 - 1.3 K/UL    Absolute Eos # 0.1 0.0 - 0.8 K/UL    Basophils Absolute 0.0 0.0 - 0.2 K/UL    Absolute Immature Granulocyte 0.0 0.0 - 0.5 K/UL   COVID-19, Rapid    Collection Time: 01/10/23  6:23 AM    Specimen: Nasopharyngeal   Result Value Ref Range    Source NASAL      SARS-CoV-2, Rapid Not detected NOTD         I have personally reviewed imaging studies showing: Other Studies:  XR HIP RIGHT (2-3 VIEWS)   Final Result   1. Periprosthetic fracture of the right proximal femur as described above.       2. No acute osseous abnormality of the left hip. XR HIP LEFT (2-3 VIEWS)   Final Result   1. Periprosthetic fracture of the right proximal femur as described above. 2. No acute osseous abnormality of the left hip. XR CHEST 1 VIEW   Final Result   1. Stable moderate cardiomegaly without evolving acute changes evident by plain   film. CT CSpine W/O Contrast   Final Result   1. No acute intracranial process evident by noncontrast CT study of the head. 2. No acute osseous abnormality the bony calvarium, skull base, or cervical   spine. 3 1.5 cm x 0.9 cm right upper lobe nodule. A benign process cannot be confirmed   and long-term stability cannot be demonstrated due to the lack of prior   comparison studies at this institution. A pulmonary or pleural would be   recommended for further assessment and potential management. By imaging, this   would be best initially assessed with a preferably contrasted CT scan of the   chest.      CT Head W/O Contrast   Final Result   1. No acute intracranial process evident by noncontrast CT study of the head. 2. No acute osseous abnormality the bony calvarium, skull base, or cervical   spine. 3 1.5 cm x 0.9 cm right upper lobe nodule. A benign process cannot be confirmed   and long-term stability cannot be demonstrated due to the lack of prior   comparison studies at this institution. A pulmonary or pleural would be   recommended for further assessment and potential management.  By imaging, this   would be best initially assessed with a preferably contrasted CT scan of the   chest.          Current Meds:  Current Facility-Administered Medications   Medication Dose Route Frequency    medicated lip ointment (BLISTEX)   Topical PRN    enoxaparin Sodium (LOVENOX) injection 30 mg  30 mg SubCUTAneous Daily    traMADol (ULTRAM) tablet 50 mg  50 mg Oral Q6H PRN    hydrALAZINE (APRESOLINE) injection 10 mg  10 mg IntraVENous Q6H PRN    escitalopram (LEXAPRO) tablet 10 mg  10 mg Oral Daily    lisinopril (PRINIVIL;ZESTRIL) tablet 20 mg  20 mg Oral Daily    sodium chloride flush 0.9 % injection 5-40 mL  5-40 mL IntraVENous 2 times per day    sodium chloride flush 0.9 % injection 5-40 mL  5-40 mL IntraVENous PRN    0.9 % sodium chloride infusion   IntraVENous PRN    ondansetron (ZOFRAN-ODT) disintegrating tablet 4 mg  4 mg Oral Q8H PRN    Or    ondansetron (ZOFRAN) injection 4 mg  4 mg IntraVENous Q6H PRN    polyethylene glycol (GLYCOLAX) packet 17 g  17 g Oral Daily PRN    acetaminophen (TYLENOL) tablet 650 mg  650 mg Oral Q6H PRN    Or    acetaminophen (TYLENOL) suppository 650 mg  650 mg Rectal Q6H PRN       Signed:  Liss Bo MD    Part of this note may have been written by using a voice dictation software. The note has been proof read but may still contain some grammatical/other typographical errors.

## 2023-01-10 NOTE — PROGRESS NOTES
________________________________________________________________________________________________    ACUTE PHYSICAL THERAPY GOALS:   (Developed with and agreed upon by patient and/or caregiver.)     (1.)Ms. Helen Chris will move from supine to sit and sit to supine , scoot up and down, and roll side to side in bed with SUPERVISION within 7 treatment day(s). (2.)Ms. Helen Chris will transfer from bed to chair and chair to bed with MINIMAL ASSIST using the least restrictive device within 7 treatment day(s). (3.)Ms. Helen Chris will ambulate with MINIMAL ASSIST for 50 feet with the least restrictive device within 7 treatment day(s). PHYSICAL THERAPY: Daily Note AM   (Link to Caseload Tracking: PT Visit Days : 3  Time In/Out PT Charge Capture  Rehab Caseload Tracker  Orders       TTWB RLE, no hip abduction    Valerie Barnhart is a 80 y.o. female   PRIMARY DIAGNOSIS: Periprosthetic hip fracture, initial encounter  Fall, initial encounter [W19. XXXA]  Closed right hip fracture, initial encounter ([facility] [S72.001A]  Periprosthetic hip fracture, initial encounter [C34. Christopher Sawyer       Inpatient: Payor: MEDICARE / Plan: MEDICARE PART A AND B / Product Type: *No Product type* /     ASSESSMENT:     REHAB RECOMMENDATIONS:   Recommendation to date pending progress:  Setting:  Short-term Rehab    Equipment:    To Be Determined     ASSESSMENT:  Ms. Helen Chris continues to make slow progress toward goals. She is very sweet, but remains confused and requires frequent re-direction to maintain orientation. She required min Ax2 for all mobility and additional cueing and assist during gait to maintain TDWB status. She transferred from bed to chair, amb to the sink, and then from chair to [facility] and back. Additional time throughout. She was positioned for comfort in the chair at the end of treatment.      SUBJECTIVE:   Ms. Helen Chris states, \"I think I broke my hip\"     Social/Functional Lives With: Alone  Type of Home: House  Home Layout: One level  Home Equipment: Cane  Has the patient had two or more falls in the past year or any fall with injury in the past year?: Yes  Receives Help From: Family  ADL Assistance: Independent  Ambulation Assistance: Independent  Transfer Assistance: Independent  OBJECTIVE:     PAIN: [employer] / O2: PRECAUTION / Hugo Chinchilla / Mai Cool:   Pre Treatment: 0         Post Treatment: no number given Vitals        Oxygen    None    RESTRICTIONS/PRECAUTIONS:  Restrictions/Precautions  Restrictions/Precautions: Weight Bearing  Lower Extremity Weight Bearing Restrictions  Right Lower Extremity Weight Bearing: Toe Touch Weight Bearing  Restrictions/Precautions: Weight Bearing     MOBILITY: I Mod I S SBA CGA Min Mod Max Total  NT x2 Comments:   Bed Mobility    Rolling [] [] [] [] [] [] [] [] [] [x] []    Supine to Sit [] [] [] [] [] [x] [] [] [] [] [x]    Scooting [] [] [] [] [] [x] [] [] [] [] [x]    Sit to Supine [] [] [] [] [] [] [] [] [] [x] []    Transfers    Sit to Stand [] [] [] [] [] [x] [] [] [] [] [x]    Bed to Chair [] [] [] [] [] [x] [] [] [] [] [x]    Stand to Sit [] [] [] [] [] [x] [] [] [] [] [x]     [] [] [] [] [] [] [] [] [] [] []    I=Independent, Mod I=Modified Independent, S=Supervision, SBA=Standby Assistance, CGA=Contact Guard Assistance,   Min=Minimal Assistance, Mod=Moderate Assistance, Max=Maximal Assistance, Total=Total Assistance, NT=Not Tested    BALANCE: Good Fair+ Fair Fair- Poor NT Comments   Sitting Static [x] [x] [] [] [] []    Sitting Dynamic [] [x] [] [] [] []              Standing Static [] [] [] [x] [] []    Standing Dynamic [] [] [] [x] [x] []      GAIT: I Mod I S SBA CGA Min Mod Max Total  NT x2 Comments:   Level of Assistance [] [] [] [] [] [x] [] [] [] [] [x]    Distance 3  feet    DME Rolling Walker    Gait Quality Decreased gloria , Decreased step clearance, and Decreased step length    Weightbearing Status      Stairs      I=Independent, Mod I=Modified Independent, S=Supervision, SBA=Standby Assistance, CGA=Contact [employer],   Min=Minimal Assistance, Mod=Moderate Assistance, Max=Maximal Assistance, Total=Total Assistance, NT=Not Tested    PLAN:   FREQUENCY AND DURATION: Daily for duration of hospital stay or until stated goals are met, whichever comes first.    TREATMENT:   TREATMENT:   Co-Treatment PT/OT necessary due to patient's decreased overall endurance/tolerance levels, as well as need for high level skilled assistance to complete functional transfers/mobility and functional tasks  Therapeutic Activity (53 Minutes): Therapeutic activity included Supine to Sit, Scooting, Transfer Training, Ambulation on level ground, Sitting balance , and Standing balance to improve functional Activity tolerance, Balance, Mobility, Strength, and ROM.     TREATMENT GRID:   Date:  1/8/23 Date:   Date:     Activity/Exercise Parameters Parameters Parameters   APs 20x     LAQ 10x                                       AFTER TREATMENT PRECAUTIONS: Alarm Activated, Bed/Chair Locked, Call light within reach, Chair, Needs within reach, and RN notified    INTERDISCIPLINARY COLLABORATION:  RN/ PCT, PT/ PTA, and OT/ MORGAN    EDUCATION:      TIME IN/OUT:  Time In: 0927  Time Out: [id number] Kurt Matthews  Minutes: [address], PTA

## 2023-01-11 LAB
ALBUMIN SERPL-MCNC: 2.5 G/DL (ref 3.2–4.6)
ALBUMIN/GLOB SERPL: 0.7 (ref 0.4–1.6)
ALP SERPL-CCNC: 73 U/L (ref 50–136)
ALT SERPL-CCNC: 16 U/L (ref 12–65)
ANION GAP SERPL CALC-SCNC: 6 MMOL/L (ref 2–11)
AST SERPL-CCNC: 23 U/L (ref 15–37)
BASOPHILS # BLD: 0 K/UL (ref 0–0.2)
BASOPHILS NFR BLD: 1 % (ref 0–2)
BILIRUB SERPL-MCNC: 1.7 MG/DL (ref 0.2–1.1)
BUN SERPL-MCNC: 32 MG/DL (ref 8–23)
CALCIUM SERPL-MCNC: 8.9 MG/DL (ref 8.3–10.4)
CHLORIDE SERPL-SCNC: 101 MMOL/L (ref 101–110)
CO2 SERPL-SCNC: 29 MMOL/L (ref 21–32)
CREAT SERPL-MCNC: 0.7 MG/DL (ref 0.6–1)
DIFFERENTIAL METHOD BLD: ABNORMAL
EOSINOPHIL # BLD: 0.1 K/UL (ref 0–0.8)
EOSINOPHIL NFR BLD: 2 % (ref 0.5–7.8)
ERYTHROCYTE [DISTWIDTH] IN BLOOD BY AUTOMATED COUNT: 14.6 % (ref 11.9–14.6)
GLOBULIN SER CALC-MCNC: 3.8 G/DL (ref 2.8–4.5)
GLUCOSE SERPL-MCNC: 100 MG/DL (ref 65–100)
HCT VFR BLD AUTO: 27.5 % (ref 35.8–46.3)
HGB BLD-MCNC: 8.9 G/DL (ref 11.7–15.4)
IMM GRANULOCYTES # BLD AUTO: 0 K/UL (ref 0–0.5)
IMM GRANULOCYTES NFR BLD AUTO: 1 % (ref 0–5)
LYMPHOCYTES # BLD: 0.7 K/UL (ref 0.5–4.6)
LYMPHOCYTES NFR BLD: 12 % (ref 13–44)
MCH RBC QN AUTO: 31.6 PG (ref 26.1–32.9)
MCHC RBC AUTO-ENTMCNC: 32.4 G/DL (ref 31.4–35)
MCV RBC AUTO: 97.5 FL (ref 82–102)
MONOCYTES # BLD: 1 K/UL (ref 0.1–1.3)
MONOCYTES NFR BLD: 17 % (ref 4–12)
NEUTS SEG # BLD: 4.2 K/UL (ref 1.7–8.2)
NEUTS SEG NFR BLD: 67 % (ref 43–78)
NRBC # BLD: 0 K/UL (ref 0–0.2)
PLATELET # BLD AUTO: 156 K/UL (ref 150–450)
PMV BLD AUTO: 11 FL (ref 9.4–12.3)
POTASSIUM SERPL-SCNC: 4.1 MMOL/L (ref 3.5–5.1)
PROT SERPL-MCNC: 6.3 G/DL (ref 6.3–8.2)
RBC # BLD AUTO: 2.82 M/UL (ref 4.05–5.2)
SODIUM SERPL-SCNC: 136 MMOL/L (ref 133–143)
WBC # BLD AUTO: 6 K/UL (ref 4.3–11.1)

## 2023-01-11 PROCEDURE — 80053 COMPREHEN METABOLIC PANEL: CPT

## 2023-01-11 PROCEDURE — 1100000000 HC RM PRIVATE

## 2023-01-11 PROCEDURE — 6370000000 HC RX 637 (ALT 250 FOR IP): Performed by: NURSE PRACTITIONER

## 2023-01-11 PROCEDURE — 36415 COLL VENOUS BLD VENIPUNCTURE: CPT

## 2023-01-11 PROCEDURE — 97530 THERAPEUTIC ACTIVITIES: CPT

## 2023-01-11 PROCEDURE — 85025 COMPLETE CBC W/AUTO DIFF WBC: CPT

## 2023-01-11 PROCEDURE — 6370000000 HC RX 637 (ALT 250 FOR IP): Performed by: FAMILY MEDICINE

## 2023-01-11 PROCEDURE — 2580000003 HC RX 258: Performed by: FAMILY MEDICINE

## 2023-01-11 PROCEDURE — 97535 SELF CARE MNGMENT TRAINING: CPT

## 2023-01-11 PROCEDURE — 6360000002 HC RX W HCPCS: Performed by: PHYSICIAN ASSISTANT

## 2023-01-11 RX ORDER — AMLODIPINE BESYLATE 2.5 MG/1
2.5 TABLET ORAL DAILY
Status: DISCONTINUED | OUTPATIENT
Start: 2023-01-12 | End: 2023-01-12 | Stop reason: HOSPADM

## 2023-01-11 RX ADMIN — LISINOPRIL 20 MG: 20 TABLET ORAL at 08:41

## 2023-01-11 RX ADMIN — TRAMADOL HYDROCHLORIDE 50 MG: 50 TABLET ORAL at 10:59

## 2023-01-11 RX ADMIN — ESCITALOPRAM OXALATE 10 MG: 10 TABLET ORAL at 08:41

## 2023-01-11 RX ADMIN — SODIUM CHLORIDE, PRESERVATIVE FREE 5 ML: 5 INJECTION INTRAVENOUS at 19:57

## 2023-01-11 RX ADMIN — TRAMADOL HYDROCHLORIDE 50 MG: 50 TABLET ORAL at 19:57

## 2023-01-11 RX ADMIN — ENOXAPARIN SODIUM 30 MG: 100 INJECTION SUBCUTANEOUS at 08:41

## 2023-01-11 RX ADMIN — SODIUM CHLORIDE, PRESERVATIVE FREE 10 ML: 5 INJECTION INTRAVENOUS at 08:41

## 2023-01-11 ASSESSMENT — PAIN DESCRIPTION - DESCRIPTORS
DESCRIPTORS: ACHING
DESCRIPTORS: ACHING

## 2023-01-11 ASSESSMENT — PAIN DESCRIPTION - PAIN TYPE
TYPE: ACUTE PAIN
TYPE: ACUTE PAIN

## 2023-01-11 ASSESSMENT — PAIN SCALES - GENERAL
PAINLEVEL_OUTOF10: 7
PAINLEVEL_OUTOF10: 0
PAINLEVEL_OUTOF10: 0
PAINLEVEL_OUTOF10: 7
PAINLEVEL_OUTOF10: 0

## 2023-01-11 ASSESSMENT — PAIN DESCRIPTION - ORIENTATION
ORIENTATION: RIGHT
ORIENTATION: RIGHT

## 2023-01-11 ASSESSMENT — PAIN DESCRIPTION - FREQUENCY
FREQUENCY: INTERMITTENT
FREQUENCY: INTERMITTENT

## 2023-01-11 ASSESSMENT — PAIN - FUNCTIONAL ASSESSMENT: PAIN_FUNCTIONAL_ASSESSMENT: ACTIVITIES ARE NOT PREVENTED

## 2023-01-11 ASSESSMENT — PAIN DESCRIPTION - ONSET
ONSET: GRADUAL
ONSET: GRADUAL

## 2023-01-11 ASSESSMENT — PAIN DESCRIPTION - LOCATION
LOCATION: HIP
LOCATION: HIP

## 2023-01-11 NOTE — PROGRESS NOTES
ACUTE OCCUPATIONAL THERAPY GOALS:   (Developed with and agreed upon by patient and/or caregiver.)  1. Patient will maintain TTWB status in RLE for 100% of treatment session and MN verbal cues from therapist.   2. Patient will complete functional transfers with MIN A while maintaining TTWB status in RLE and with adaptive equipment as needed. 3. Patient will complete lower body bathing and dressing with MIN A and adaptive equipment as needed. 4. Patient will complete toileting and toilet transfer with MIN A.   5. Patient will tolerate 25 minutes of OT treatment with 1-2 rest breaks to increase activity tolerance for ADLs. 6. Patient will participate in at least 10 minutes of BUE therapeutic exercises to strengthen BUE for functional transfers. OCCUPATIONAL THERAPY: Daily Note AM   OT Visit Days: 5   Time In/Out  OT Charge Capture  Rehab Caseload Tracker  OT Orders    Leni Pay is a 80 y.o. female   PRIMARY DIAGNOSIS: Periprosthetic hip fracture, initial encounter  Fall, initial encounter [W19. XXXA]  Closed right hip fracture, initial encounter ([facility] [S72.001A]  Periprosthetic hip fracture, initial encounter [I54. Luisana Oms       Inpatient: Payor: MEDICARE / Plan: MEDICARE PART A AND B / Product Type: *No Product type* /     ASSESSMENT:     REHAB RECOMMENDATIONS: CURRENT LEVEL OF FUNCTION:  (Most Recently Demonstrated)   Recommendation to date pending progress:  Setting:  Short-term Rehab    Equipment:    To Be Determined Bathing: Moderate Assist  Dressing:  Maximal Assist  Feeding/Grooming:  Supervision/Setup  Toileting:  Supervision/Setup  Functional Mobility:  Minimal Assist x 2     ASSESSMENT:  Ms. Pa Plascencia is doing fair today. Pt was able to perform bed mobility with min Ax2. Pt was able to stand and transfer with min A x2. Fair standing balance noted during mobility and tranfers. Pt completed ADL with the assistance listed below. Pt making progress with goals.  Will continue to benefit from skilled OT during stay.        SUBJECTIVE:     Ms. Juan Manuel Segundo states, \"I want to talk to my mom\"     Social/Functional Lives With: Alone  Type of Home: House  Home Layout: One level  Home Equipment: Bar Snow  Has the patient had two or more falls in the past year or any fall with injury in the past year?: Yes  Receives Help From: Family  ADL Assistance: Independent  Ambulation Assistance: Independent  Transfer Assistance: Independent    OBJECTIVE:     Vani Gibbs / Evgeny Murphy / Mary Miller: NA    RESTRICTIONS/PRECAUTIONS:  Restrictions/Precautions  Restrictions/Precautions: Weight Bearing  Lower Extremity Weight Bearing Restrictions  Right Lower Extremity Weight Bearing: Toe Touch Weight Bearing        PAIN: VITALS / O2:   Pre Treatment: 0           Post Treatment: 0 Vitals          Oxygen        MOBILITY: I Mod I S SBA CGA Min Mod Max Total  NT x2 Comments:   Bed Mobility    Rolling [] [] [] [] [] [] [] [] [] [] []    Supine to Sit [] [] [] [] [] [x] [] [] [] [] [x]    Scooting [] [] [] [] [] [x] [] [] [] [] []    Sit to Supine [] [] [] [] [] [] [] [] [] [x] []    Transfers    Sit to Stand [] [] [] [] [] [x] [] [] [] [] [x]    Bed to Chair [] [] [] [] [] [x] [] [] [] [] [x]    Stand to Sit [] [] [] [] [] [x] [] [] [] [] [x]    Tub/Shower [] [] [] [] [] [] [] [] [] [x] []     Toilet [] [] [] [] [] [x] [] [] [] [] [x]      [] [] [] [] [] [] [] [] [] [] []    I=Independent, Mod I=Modified Independent, S=Supervision/Setup, SBA=Standby Assistance, CGA=Contact Guard Assistance, Min=Minimal Assistance, Mod=Moderate Assistance, Max=Maximal Assistance, Total=Total Assistance, NT=Not Tested    ACTIVITIES OF DAILY LIVING: I Mod I S SBA CGA Min Mod Max Total NT Comments   BASIC ADLs:              Upper Body   Bathing [] [] [x] [] [] [] [] [] [] []    Lower Body Bathing [] [] [] [] [] [] [x] [] [] []    Toileting [] [] [x] [] [] [] [] [] [] []    Upper Body Dressing [] [] [x] [] [] [] [] [] [] []    Lower Body Dressing [] [] [] [] [] [] [] [x] [] [] Socks    Feeding [] [] [] [] [] [] [] [] [] [x]    Grooming [] [] [x] [] [] [] [] [] [] []    Personal Device Care [] [] [] [] [] [] [] [] [] [x]    Functional Mobility [] [] [] [] [] [x] [] [] [] [] RW X 2   I=Independent, Mod I=Modified Independent, S=Supervision/Setup, SBA=Standby Assistance, CGA=Contact Guard Assistance, Min=Minimal Assistance, Mod=Moderate Assistance, Max=Maximal Assistance, Total=Total Assistance, NT=Not Tested    BALANCE: Good Fair+ Fair Fair- Poor NT Comments   Sitting Static [] [x] [] [] [] []    Sitting Dynamic [] [] [x] [] [] []              Standing Static [] [] [] [x] [] []    Standing Dynamic [] [] [] [x] [] []        PLAN:     FREQUENCY/DURATION   OT Plan of Care: 5 times/week for duration of hospital stay or until stated goals are met, whichever comes first.    TREATMENT:     TREATMENT:   Co-Treatment PT/OT necessary due to patient's decreased overall endurance/tolerance levels, as well as need for high level skilled assistance to complete functional transfers/mobility and functional tasks  Self Care (30 minutes): Patient participated in upper body bathing, lower body bathing, toileting, upper body dressing, lower body dressing, and grooming ADLs in supported sitting and unsupported sitting with maximal verbal, manual, and tactile cueing to increase independence, decrease assistance required, and increase activity tolerance. Patient also participated in functional mobility, functional transfer, and energy conservation training to increase independence, decrease assistance required, and increase activity tolerance.      TREATMENT GRID:  N/A    AFTER TREATMENT PRECAUTIONS: Alarm Activated, Bed/Chair Locked, Call light within reach, Chair, and Needs within reach    INTERDISCIPLINARY COLLABORATION:  RN/ PCT, PT/ PTA, and OT/ MORGAN    EDUCATION:       TOTAL TREATMENT DURATION AND TIME:  Time In: 1030  Time Out: 1100  Minutes: [facility]

## 2023-01-11 NOTE — PLAN OF CARE
Problem: Pain  Goal: Verbalizes/displays adequate comfort level or baseline comfort level  1/10/2023 1930 by Yesica Zamora RN  Outcome: Progressing  Flowsheets (Taken 1/10/2023 1911)  Verbalizes/displays adequate comfort level or baseline comfort level:   Encourage patient to monitor pain and request assistance   Assess pain using appropriate pain scale  1/10/2023 1305 by Kaley Choi RN  Outcome: Progressing     Problem: Discharge Planning  Goal: Discharge to home or other facility with appropriate resources  1/10/2023 1930 by Yesica Zamora RN  Outcome: Progressing  Flowsheets (Taken 1/10/2023 1911)  Discharge to home or other facility with appropriate resources: Identify barriers to discharge with patient and caregiver  1/10/2023 1305 by Kaley Choi RN  Outcome: Progressing     Problem: Skin/Tissue Integrity  Goal: Absence of new skin breakdown  Description: 1. Monitor for areas of redness and/or skin breakdown  2. Assess vascular access sites hourly  3. Every 4-6 hours minimum:  Change oxygen saturation probe site  4. Every 4-6 hours:  If on nasal continuous positive airway pressure, respiratory therapy assess nares and determine need for appliance change or resting period.   1/10/2023 1930 by Yesica Zamora RN  Outcome: Progressing  1/10/2023 1305 by Kaley Choi RN  Outcome: Progressing     Problem: Safety - Adult  Goal: Free from fall injury  1/10/2023 1930 by Yesica Zamora RN  Outcome: Progressing  1/10/2023 1305 by Kaley Choi RN  Outcome: Electakira Sins (Taken 1/10/2023 0258 by Isabel Guerra RN)  Free From Fall Injury: Instruct family/caregiver on patient safety     Problem: ABCDS Injury Assessment  Goal: Absence of physical injury  1/10/2023 1930 by Yesica Zamora RN  Outcome: Progressing  Flowsheets (Taken 1/10/2023 1911)  Absence of Physical Injury: Implement safety measures based on patient assessment  1/10/2023 1305 by Kaley Choi RN  Outcome: Progressing  Flowsheets (Taken 1/10/2023 0258 by Piyush Tenorio RN)  Absence of Physical Injury: Implement safety measures based on patient assessment     Problem: Skin/Tissue Integrity - Adult  Goal: Skin integrity remains intact  1/10/2023 1930 by Vi Flores RN  Outcome: Progressing  Flowsheets (Taken 1/10/2023 1911)  Skin Integrity Remains Intact: Monitor for areas of redness and/or skin breakdown  1/10/2023 1305 by Macey Gramajo RN  Outcome: Progressing  Flowsheets (Taken 1/10/2023 0258 by Piyush Tenorio RN)  Skin Integrity Remains Intact: Monitor for areas of redness and/or skin breakdown

## 2023-01-11 NOTE — PROGRESS NOTES
________________________________________________________________________________________________    ACUTE PHYSICAL THERAPY GOALS:   (Developed with and agreed upon by patient and/or caregiver.)     (1.)Ms. Santiago Turner will move from supine to sit and sit to supine , scoot up and down, and roll side to side in bed with SUPERVISION within 7 treatment day(s). (2.)Ms. Santiago Turner will transfer from bed to chair and chair to bed with MINIMAL ASSIST using the least restrictive device within 7 treatment day(s). (3.)Ms. Santiago Turner will ambulate with MINIMAL ASSIST for 50 feet with the least restrictive device within 7 treatment day(s). PHYSICAL THERAPY: Daily Note AM   (Link to Caseload Tracking: PT Visit Days : 4  Time In/Out PT Charge Capture  Rehab Caseload Tracker  Orders       TTWB RLE, no hip abduction    Jonas Lares is a 80 y.o. female   PRIMARY DIAGNOSIS: Periprosthetic hip fracture, initial encounter  Fall, initial encounter [W19. XXXA]  [facility] [S72.001A]  Periprosthetic hip fracture, initial encounter [E62. 8XXA, Z96.649]       Inpatient: Payor: MEDICARE / Plan: MEDICARE PART A AND B / Product Type: *No Product type* /     ASSESSMENT:     REHAB RECOMMENDATIONS:   Recommendation to date pending progress:  Setting:  Short-term Rehab    Equipment:    To Be Determined     ASSESSMENT:  Ms. Santiago Turner is making slow progress toward goals. The patient continues to require min Ax2 for all mobility and frequent cueing for re-direction and re-orientation.   She performed bed mobility, STS, toilet transfers, static standing, and ambulation for a few steps with heavy cueing and foot under foot technique to decrease WB.       SUBJECTIVE:   Ms. Santiago Turner states, \"I need to call my mom\"     Social/Functional Lives With: Alone  Type of Home: House  Home Layout: One level  Home Equipment: [employer]  Has the patient had two or more falls in the past year or any fall with injury in the past year?: Yes  Receives Help From: Family  ADL Assistance: Independent  Ambulation Assistance: Independent  Transfer Assistance: Independent  OBJECTIVE:     PAIN: Davion Frank / O2: PRECAUTION / David Miramontes / Charmayne Big:   Pre Treatment: 0         Post Treatment: 0 Vitals        Oxygen    None    RESTRICTIONS/PRECAUTIONS:  Restrictions/Precautions  Restrictions/Precautions: Weight Bearing  Lower Extremity Weight Bearing Restrictions  Right Lower Extremity Weight Bearing: Toe Touch Weight Bearing  Restrictions/Precautions: Weight Bearing     MOBILITY: I Mod I S SBA CGA Min Mod Max Total  NT x2 Comments:   Bed Mobility    Rolling [] [] [] [] [] [] [] [] [] [x] []    Supine to Sit [] [] [] [] [] [x] [] [] [] [] [x]    Scooting [] [] [] [] [] [x] [] [] [] [] [x]    Sit to Supine [] [] [] [] [] [] [] [] [] [x] []    Transfers    Sit to Stand [] [] [] [] [] [x] [] [] [] [] [x]    Bed to Chair [] [] [] [] [] [x] [] [] [] [] [x]    Stand to Sit [] [] [] [] [] [x] [] [] [] [] [x]     [] [] [] [] [] [] [] [] [] [] []    I=Independent, Mod I=Modified Independent, S=Supervision, SBA=Standby Assistance, CGA=Contact Guard Assistance,   Min=Minimal Assistance, Mod=Moderate Assistance, Max=Maximal Assistance, Total=Total Assistance, NT=Not Tested    BALANCE: Good Fair+ Fair Fair- Poor NT Comments   Sitting Static [x] [x] [] [] [] []    Sitting Dynamic [] [x] [] [] [] []              Standing Static [] [] [] [x] [] []    Standing Dynamic [] [] [] [x] [x] []      GAIT: I Mod I S SBA CGA Min Mod Max Total  NT x2 Comments:   Level of Assistance [] [] [] [] [] [x] [] [] [] [] [x]    Distance 3  feet    DME Rolling Walker    Gait Quality Decreased gloria , Decreased step clearance, and Decreased step length    Weightbearing Status      Stairs      I=Independent, Mod I=Modified Independent, S=Supervision, SBA=Standby Assistance, CGA=Contact Guard Assistance,   Min=Minimal Assistance, Mod=Moderate Assistance, Max=Maximal Assistance, Total=Total Assistance, NT=Not Tested    PLAN:   FREQUENCY AND DURATION: Daily for duration of hospital stay or until stated goals are met, whichever comes first.    TREATMENT:   TREATMENT:   Co-Treatment PT/OT necessary due to patient's decreased overall endurance/tolerance levels, as well as need for high level skilled assistance to complete functional transfers/mobility and functional tasks  Therapeutic Activity (41 Minutes): Therapeutic activity included Supine to Sit, Scooting, Transfer Training, Ambulation on level ground, Sitting balance , and Standing balance to improve functional Activity tolerance, Balance, Mobility, Strength, and ROM.     TREATMENT GRID:   Date:  1/8/23 Date:   Date:     Activity/Exercise Parameters Parameters Parameters   APs 20x     LAQ 10x                                       AFTER TREATMENT PRECAUTIONS: Alarm Activated, Bed/Chair Locked, Call light within reach, Chair, Needs within reach, and RN notified    INTERDISCIPLINARY COLLABORATION:  RN/ PCT, PT/ PTA, and OT/ MORGAN    EDUCATION:      TIME IN/OUT:  Time In: 1030  Time Out: [place] 25  Minutes: Frida Aquino PTA

## 2023-01-11 NOTE — CARE COORDINATION
CM spoke with patient at bedside regarding bed offers from [facility] and [facility] at [place]. Patient is agreeable to referral to [facility]. Call placed to patient's sister-in-law and update provided. She is agreeable to the discharge plan and shares that patient's niece is the HCPOA. Call placed to patient's niece and VM left requesting return call. [facility] can accept patient tomorrow if she is medically ready. PPD completed 1/10/2023 and rapid covid testing negative 1/10/2023. Patient placed on will call transport for tomorrow with [facility] transport; reference number [id number]. Attending updated over [place].

## 2023-01-12 VITALS
HEIGHT: 67 IN | BODY MASS INDEX: 21.19 KG/M2 | TEMPERATURE: 98.1 F | RESPIRATION RATE: 16 BRPM | WEIGHT: 135 LBS | HEART RATE: 60 BPM | SYSTOLIC BLOOD PRESSURE: 130 MMHG | OXYGEN SATURATION: 97 % | DIASTOLIC BLOOD PRESSURE: 52 MMHG

## 2023-01-12 PROBLEM — F03.90 DEMENTIA (HCC): Status: ACTIVE | Noted: 2023-01-12

## 2023-01-12 LAB
ALBUMIN SERPL-MCNC: 2.5 G/DL (ref 3.2–4.6)
ALBUMIN/GLOB SERPL: 0.6 (ref 0.4–1.6)
ALP SERPL-CCNC: 76 U/L (ref 50–136)
ALT SERPL-CCNC: 14 U/L (ref 12–65)
ANION GAP SERPL CALC-SCNC: 5 MMOL/L (ref 2–11)
AST SERPL-CCNC: 18 U/L (ref 15–37)
BASOPHILS # BLD: 0 K/UL (ref 0–0.2)
BASOPHILS NFR BLD: 1 % (ref 0–2)
BILIRUB SERPL-MCNC: 1.4 MG/DL (ref 0.2–1.1)
BUN SERPL-MCNC: 32 MG/DL (ref 8–23)
CALCIUM SERPL-MCNC: 8.9 MG/DL (ref 8.3–10.4)
CHLORIDE SERPL-SCNC: 101 MMOL/L (ref 101–110)
CO2 SERPL-SCNC: 30 MMOL/L (ref 21–32)
CREAT SERPL-MCNC: 0.7 MG/DL (ref 0.6–1)
DIFFERENTIAL METHOD BLD: ABNORMAL
EOSINOPHIL # BLD: 0.2 K/UL (ref 0–0.8)
EOSINOPHIL NFR BLD: 3 % (ref 0.5–7.8)
ERYTHROCYTE [DISTWIDTH] IN BLOOD BY AUTOMATED COUNT: 14.9 % (ref 11.9–14.6)
GLOBULIN SER CALC-MCNC: 4 G/DL (ref 2.8–4.5)
GLUCOSE SERPL-MCNC: 102 MG/DL (ref 65–100)
HCT VFR BLD AUTO: 26.7 % (ref 35.8–46.3)
HGB BLD-MCNC: 8.7 G/DL (ref 11.7–15.4)
IMM GRANULOCYTES # BLD AUTO: 0 K/UL (ref 0–0.5)
IMM GRANULOCYTES NFR BLD AUTO: 1 % (ref 0–5)
LYMPHOCYTES # BLD: 1.1 K/UL (ref 0.5–4.6)
LYMPHOCYTES NFR BLD: 19 % (ref 13–44)
MCH RBC QN AUTO: 31.8 PG (ref 26.1–32.9)
MCHC RBC AUTO-ENTMCNC: 32.6 G/DL (ref 31.4–35)
MCV RBC AUTO: 97.4 FL (ref 82–102)
MONOCYTES # BLD: 0.9 K/UL (ref 0.1–1.3)
MONOCYTES NFR BLD: 15 % (ref 4–12)
NEUTS SEG # BLD: 3.8 K/UL (ref 1.7–8.2)
NEUTS SEG NFR BLD: 61 % (ref 43–78)
NRBC # BLD: 0 K/UL (ref 0–0.2)
PLATELET # BLD AUTO: 192 K/UL (ref 150–450)
PMV BLD AUTO: 11.2 FL (ref 9.4–12.3)
POTASSIUM SERPL-SCNC: 3.6 MMOL/L (ref 3.5–5.1)
PROT SERPL-MCNC: 6.5 G/DL (ref 6.3–8.2)
RBC # BLD AUTO: 2.74 M/UL (ref 4.05–5.2)
SODIUM SERPL-SCNC: 136 MMOL/L (ref 133–143)
WBC # BLD AUTO: 6.1 K/UL (ref 4.3–11.1)

## 2023-01-12 PROCEDURE — 6370000000 HC RX 637 (ALT 250 FOR IP): Performed by: NURSE PRACTITIONER

## 2023-01-12 PROCEDURE — 80053 COMPREHEN METABOLIC PANEL: CPT

## 2023-01-12 PROCEDURE — 6360000002 HC RX W HCPCS: Performed by: PHYSICIAN ASSISTANT

## 2023-01-12 PROCEDURE — 85025 COMPLETE CBC W/AUTO DIFF WBC: CPT

## 2023-01-12 PROCEDURE — 36415 COLL VENOUS BLD VENIPUNCTURE: CPT

## 2023-01-12 PROCEDURE — 6370000000 HC RX 637 (ALT 250 FOR IP): Performed by: INTERNAL MEDICINE

## 2023-01-12 PROCEDURE — 97530 THERAPEUTIC ACTIVITIES: CPT

## 2023-01-12 PROCEDURE — 2580000003 HC RX 258: Performed by: FAMILY MEDICINE

## 2023-01-12 PROCEDURE — 97535 SELF CARE MNGMENT TRAINING: CPT

## 2023-01-12 PROCEDURE — 6370000000 HC RX 637 (ALT 250 FOR IP): Performed by: FAMILY MEDICINE

## 2023-01-12 RX ORDER — ACETAMINOPHEN 325 MG/1
650 TABLET ORAL EVERY 4 HOURS PRN
Qty: 30 TABLET | Refills: 0
Start: 2023-01-12

## 2023-01-12 RX ORDER — POLYETHYLENE GLYCOL 3350 17 G/17G
17 POWDER, FOR SOLUTION ORAL DAILY PRN
Qty: 527 G | Refills: 1 | Status: SHIPPED | OUTPATIENT
Start: 2023-01-12 | End: 2023-02-11

## 2023-01-12 RX ORDER — ENOXAPARIN SODIUM 100 MG/ML
30 INJECTION SUBCUTANEOUS DAILY
Qty: 9 ML | Refills: 0
Start: 2023-01-12 | End: 2023-02-11

## 2023-01-12 RX ORDER — PANTOPRAZOLE SODIUM 20 MG/1
20 TABLET, DELAYED RELEASE ORAL DAILY
Qty: 30 TABLET | Refills: 0
Start: 2023-01-12

## 2023-01-12 RX ORDER — TRAMADOL HYDROCHLORIDE 50 MG/1
25 TABLET ORAL EVERY 8 HOURS PRN
Qty: 6 TABLET | Refills: 0 | Status: SHIPPED | OUTPATIENT
Start: 2023-01-12 | End: 2023-01-17

## 2023-01-12 RX ORDER — AMLODIPINE BESYLATE 2.5 MG/1
2.5 TABLET ORAL DAILY
Qty: 30 TABLET | Refills: 3 | Status: SHIPPED | OUTPATIENT
Start: 2023-01-13

## 2023-01-12 RX ADMIN — ESCITALOPRAM OXALATE 10 MG: 10 TABLET ORAL at 09:41

## 2023-01-12 RX ADMIN — TRAMADOL HYDROCHLORIDE 50 MG: 50 TABLET ORAL at 16:19

## 2023-01-12 RX ADMIN — AMLODIPINE BESYLATE 2.5 MG: 2.5 TABLET ORAL at 11:20

## 2023-01-12 RX ADMIN — LISINOPRIL 20 MG: 20 TABLET ORAL at 09:41

## 2023-01-12 RX ADMIN — TRAMADOL HYDROCHLORIDE 50 MG: 50 TABLET ORAL at 09:19

## 2023-01-12 RX ADMIN — SODIUM CHLORIDE, PRESERVATIVE FREE 10 ML: 5 INJECTION INTRAVENOUS at 09:42

## 2023-01-12 RX ADMIN — ENOXAPARIN SODIUM 30 MG: 100 INJECTION SUBCUTANEOUS at 09:41

## 2023-01-12 ASSESSMENT — PAIN DESCRIPTION - LOCATION
LOCATION: HIP
LOCATION: HIP

## 2023-01-12 ASSESSMENT — PAIN DESCRIPTION - ONSET
ONSET: GRADUAL
ONSET: GRADUAL

## 2023-01-12 ASSESSMENT — PAIN DESCRIPTION - PAIN TYPE
TYPE: ACUTE PAIN
TYPE: ACUTE PAIN

## 2023-01-12 ASSESSMENT — PAIN DESCRIPTION - DESCRIPTORS
DESCRIPTORS: ACHING
DESCRIPTORS: ACHING

## 2023-01-12 ASSESSMENT — PAIN SCALES - GENERAL
PAINLEVEL_OUTOF10: 7
PAINLEVEL_OUTOF10: 0
PAINLEVEL_OUTOF10: 0
PAINLEVEL_OUTOF10: 6

## 2023-01-12 ASSESSMENT — PAIN DESCRIPTION - FREQUENCY
FREQUENCY: INTERMITTENT
FREQUENCY: INTERMITTENT

## 2023-01-12 ASSESSMENT — PAIN DESCRIPTION - ORIENTATION
ORIENTATION: RIGHT
ORIENTATION: RIGHT

## 2023-01-12 NOTE — PROGRESS NOTES
TRANSFER - OUT REPORT:    Verbal report given to Rg north RN on Rosalva Caldwell  being transferred to [facility] for routine progression of patient care       Report consisted of patient's Situation, Background, Assessment and   Recommendations(SBAR). Information from the following report(s) Nurse Handoff Report was reviewed with the receiving nurse. [place] Assessment: No data recorded  Lines:       Opportunity for questions and clarification was provided.       Patient transported with:

## 2023-01-12 NOTE — PROGRESS NOTES
Hospitalist Progress Note   Admit Date:  2023  4:59 PM   Name:  Rachael Jennings   Age:  80 y.o. Sex:  female  :  1922   MRN:  [id number]   Room:  [facility]/    Presenting Complaint: Fall     Reason(s) for Admission: Fall, initial encounter [W19. XXXA]  [facility] [S72.001A]  Periprosthetic hip fracture, initial encounter [Q06. One [place] Course:   80 y.o. female with a PMH of hypertension, dementia, anxiety who presented to the ER after an unwitnessed fall. Patient lives at home by herself. Found by sister-in-law. Patient does not remember what happened. Denies loss of consciousness. Patient complaining of hip pain since fall. No other complaint. Not on any blood thinner. Labs notable for mild leukocytosis, otherwise unremarkable. XR hip showed periprosthetic fracture of the right proximal femur. CT head and CT cervical spine negative for fracture. Hospitalist consulted for admission and Ortho surgery was consulted. They will treat non-operatively. The patient will need rehab at discharge. Subjective & 24hr Events (23): Patient was seen and examined at the bedside. No overnight events. Sitting in chair, awake and talking good  States her brother and GEE lives close by  Not c/o Rt hip pain      Assessment & Plan:   Acute periprosthetic fracture of right proximal femur  - Plain films reviewed  - Ortho consulted -l treat nonoperatively  - PT/OT recommending short-term rehab  -continue pain control    Leukocytosis -resolved  - Likely reactive  - Resolved  - UA negative  - Chest x-ray without acute abnormality    Hypertension  - Continue home lisinopril  Unocntrolled - Bpin 150;s - add low dose norvasc  - As needed hydralazine    Mood disorder  - Continue Lexapro        Anticipated discharge needs:    Dispo pending clinical course. PT recommending short-term rehab    Diet:  ADULT DIET;  Regular  ADULT ORAL NUTRITION SUPPLEMENT; Breakfast, Lunch, Dinner; Standard High Calorie/High Protein Oral Supplement  DVT PPx: Lovenox  Code status: DNR    Hospital Problems:  Principal Problem:    Periprosthetic hip fracture, initial encounter  Resolved Problems:    * No resolved hospital problems. *      Objective:   Patient Vitals for the past 24 hrs:   Temp Pulse Resp BP SpO2   01/11/23 1942 97.5 °F (36.4 °C) 73 17 (!) 150/53 96 %   01/11/23 1521 98.1 °F (36.7 °C) 64 18 (!) 120/57 95 %   01/11/23 1127 98.1 °F (36.7 °C) 73 18 (!) 128/56 98 %   01/11/23 0713 97.5 °F (36.4 °C) 69 16 (!) 149/75 96 %   01/11/23 0432 97.9 °F (36.6 °C) 74 16 (!) 173/65 94 %   01/11/23 0012 97.7 °F (36.5 °C) 77 17 (!) 174/63 94 %       Oxygen Therapy  SpO2: 96 %  Pulse Oximeter Device Mode: Continuous  O2 Device: None (Room air)    Estimated body mass index is 21.14 kg/m² as calculated from the following:    Height as of this encounter: 5' 7\" (1.702 m). Weight as of this encounter: 135 lb (61.2 kg). No intake or output data in the 24 hours ending 01/11/23 2131        Physical Exam:   Blood pressure (!) 150/53, pulse 73, temperature 97.5 °F (36.4 °C), temperature source Axillary, resp. rate 17, height 5' 7\" (1.702 m), weight 135 lb (61.2 kg), SpO2 96 %. General:     Thin female, no distress  Not in acute pain  Head:  Normocephalic, atraumatic  Eyes:  Sclerae appear normal.  Pupils equally round. ENT:  Nares appear normal.  Moist oral mucosa  Neck:  No restricted ROM. Trachea midline   CV:   RRR. No m/r/g. No jugular venous distension. Lungs:   CTAB. No wheezing, rhonchi, or rales. Symmetric expansion. Poor inspiratory effort - no distress  Abdomen:   Soft, nontender, nondistended. Gaseous distention, no pain  Extremities: No cyanosis or clubbing. No edema    No acute Rt hip pain    Old healed scar in left shin   Left knee- old surgical scar   Rt thigh - old skin graft site  Skin:     No rashes and normal coloration. Warm and dry.     Neuro:  CN II-XII grossly intact. Sensation intact. Psych:  Normal mood and affect.       I have personally reviewed labs and tests showing:  Recent Labs:  Recent Results (from the past 48 hour(s))   Comprehensive Metabolic Panel w/ Reflex to MG    Collection Time: 01/10/23  5:43 AM   Result Value Ref Range    Sodium 136 133 - 143 mmol/L    Potassium 4.1 3.5 - 5.1 mmol/L    Chloride 101 101 - 110 mmol/L    CO2 28 21 - 32 mmol/L    Anion Gap 7 2 - 11 mmol/L    Glucose 95 65 - 100 mg/dL    BUN 33 (H) 8 - 23 MG/DL    Creatinine 0.80 0.6 - 1.0 MG/DL    Est, Glom Filt Rate >60 >60 ml/min/1.73m2    Calcium 9.1 8.3 - 10.4 MG/DL    Total Bilirubin 1.4 (H) 0.2 - 1.1 MG/DL    ALT 17 12 - 65 U/L    AST 30 15 - 37 U/L    Alk Phosphatase 81 50 - 136 U/L    Total Protein 6.7 6.3 - 8.2 g/dL    Albumin 2.8 (L) 3.2 - 4.6 g/dL    Globulin 3.9 2.8 - 4.5 g/dL    Albumin/Globulin Ratio 0.7 0.4 - 1.6     CBC with Auto Differential    Collection Time: 01/10/23  5:43 AM   Result Value Ref Range    WBC 6.9 4.3 - 11.1 K/uL    RBC 2.82 (L) 4.05 - 5.2 M/uL    Hemoglobin 9.0 (L) 11.7 - 15.4 g/dL    Hematocrit 27.1 (L) 35.8 - 46.3 %    MCV 96.1 82 - 102 FL    MCH 31.9 26.1 - 32.9 PG    MCHC 33.2 31.4 - 35.0 g/dL    RDW 14.4 11.9 - 14.6 %    Platelets 309 695 - 648 K/uL    MPV 11.8 9.4 - 12.3 FL    nRBC 0.00 0.0 - 0.2 K/uL    Differential Type AUTOMATED      Seg Neutrophils 71 43 - 78 %    Lymphocytes 15 13 - 44 %    Monocytes 12 4.0 - 12.0 %    Eosinophils % 2 0.5 - 7.8 %    Basophils 0 0.0 - 2.0 %    Immature Granulocytes 0 0.0 - 5.0 %    Segs Absolute 4.9 1.7 - 8.2 K/UL    Absolute Lymph # 1.0 0.5 - 4.6 K/UL    Absolute Mono # 0.8 0.1 - 1.3 K/UL    Absolute Eos # 0.1 0.0 - 0.8 K/UL    Basophils Absolute 0.0 0.0 - 0.2 K/UL    Absolute Immature Granulocyte 0.0 0.0 - 0.5 K/UL   COVID-19, Rapid    Collection Time: 01/10/23  6:23 AM    Specimen: Nasopharyngeal   Result Value Ref Range    Source NASAL      SARS-CoV-2, Rapid Not detected NOTD     PLEASE READ & DOCUMENT PPD TEST IN 72 HRS    Collection Time: 01/10/23  5:00 PM   Result Value Ref Range    PPD, (POC) Negative Negative    mm Induration 0 0 - 5 mm   Comprehensive Metabolic Panel w/ Reflex to MG    Collection Time: 01/11/23  5:22 AM   Result Value Ref Range    Sodium 136 133 - 143 mmol/L    Potassium 4.1 3.5 - 5.1 mmol/L    Chloride 101 101 - 110 mmol/L    CO2 29 21 - 32 mmol/L    Anion Gap 6 2 - 11 mmol/L    Glucose 100 65 - 100 mg/dL    BUN 32 (H) 8 - 23 MG/DL    Creatinine 0.70 0.6 - 1.0 MG/DL    Est, Glom Filt Rate >60 >60 ml/min/1.73m2    Calcium 8.9 8.3 - 10.4 MG/DL    Total Bilirubin 1.7 (H) 0.2 - 1.1 MG/DL    ALT 16 12 - 65 U/L    AST 23 15 - 37 U/L    Alk Phosphatase 73 50 - 136 U/L    Total Protein 6.3 6.3 - 8.2 g/dL    Albumin 2.5 (L) 3.2 - 4.6 g/dL    Globulin 3.8 2.8 - 4.5 g/dL    Albumin/Globulin Ratio 0.7 0.4 - 1.6     CBC with Auto Differential    Collection Time: 01/11/23  5:22 AM   Result Value Ref Range    WBC 6.0 4.3 - 11.1 K/uL    RBC 2.82 (L) 4.05 - 5.2 M/uL    Hemoglobin 8.9 (L) 11.7 - 15.4 g/dL    Hematocrit 27.5 (L) 35.8 - 46.3 %    MCV 97.5 82 - 102 FL    MCH 31.6 26.1 - 32.9 PG    MCHC 32.4 31.4 - 35.0 g/dL    RDW 14.6 11.9 - 14.6 %    Platelets 373 172 - 469 K/uL    MPV 11.0 9.4 - 12.3 FL    nRBC 0.00 0.0 - 0.2 K/uL    Differential Type AUTOMATED      Seg Neutrophils 67 43 - 78 %    Lymphocytes 12 (L) 13 - 44 %    Monocytes 17 (H) 4.0 - 12.0 %    Eosinophils % 2 0.5 - 7.8 %    Basophils 1 0.0 - 2.0 %    Immature Granulocytes 1 0.0 - 5.0 %    Segs Absolute 4.2 1.7 - 8.2 K/UL    Absolute Lymph # 0.7 0.5 - 4.6 K/UL    Absolute Mono # 1.0 0.1 - 1.3 K/UL    Absolute Eos # 0.1 0.0 - 0.8 K/UL    Basophils Absolute 0.0 0.0 - 0.2 K/UL    Absolute Immature Granulocyte 0.0 0.0 - 0.5 K/UL       I have personally reviewed imaging studies showing: Other Studies:  XR HIP RIGHT (2-3 VIEWS)   Final Result   1. Periprosthetic fracture of the right proximal femur as described above.       2. No acute osseous abnormality of the left hip. XR HIP LEFT (2-3 VIEWS)   Final Result   1. Periprosthetic fracture of the right proximal femur as described above. 2. No acute osseous abnormality of the left hip. XR CHEST 1 VIEW   Final Result   1. Stable moderate cardiomegaly without evolving acute changes evident by plain   film. CT CSpine W/O Contrast   Final Result   1. No acute intracranial process evident by noncontrast CT study of the head. 2. No acute osseous abnormality the bony calvarium, skull base, or cervical   spine. 3 1.5 cm x 0.9 cm right upper lobe nodule. A benign process cannot be confirmed   and long-term stability cannot be demonstrated due to the lack of prior   comparison studies at this institution. A pulmonary or pleural would be   recommended for further assessment and potential management. By imaging, this   would be best initially assessed with a preferably contrasted CT scan of the   chest.      CT Head W/O Contrast   Final Result   1. No acute intracranial process evident by noncontrast CT study of the head. 2. No acute osseous abnormality the bony calvarium, skull base, or cervical   spine. 3 1.5 cm x 0.9 cm right upper lobe nodule. A benign process cannot be confirmed   and long-term stability cannot be demonstrated due to the lack of prior   comparison studies at this institution. A pulmonary or pleural would be   recommended for further assessment and potential management.  By imaging, this   would be best initially assessed with a preferably contrasted CT scan of the   chest.          Current Meds:  Current Facility-Administered Medications   Medication Dose Route Frequency    medicated lip ointment (BLISTEX)   Topical PRN    enoxaparin Sodium (LOVENOX) injection 30 mg  30 mg SubCUTAneous Daily    traMADol (ULTRAM) tablet 50 mg  50 mg Oral Q6H PRN    hydrALAZINE (APRESOLINE) injection 10 mg  10 mg IntraVENous Q6H PRN    escitalopram (LEXAPRO) tablet 10 mg  10 mg Oral Daily    lisinopril (PRINIVIL;ZESTRIL) tablet 20 mg  20 mg Oral Daily    sodium chloride flush 0.9 % injection 5-40 mL  5-40 mL IntraVENous 2 times per day    sodium chloride flush 0.9 % injection 5-40 mL  5-40 mL IntraVENous PRN    0.9 % sodium chloride infusion   IntraVENous PRN    ondansetron (ZOFRAN-ODT) disintegrating tablet 4 mg  4 mg Oral Q8H PRN    Or    ondansetron (ZOFRAN) injection 4 mg  4 mg IntraVENous Q6H PRN    polyethylene glycol (GLYCOLAX) packet 17 g  17 g Oral Daily PRN    acetaminophen (TYLENOL) tablet 650 mg  650 mg Oral Q6H PRN    Or    acetaminophen (TYLENOL) suppository 650 mg  650 mg Rectal Q6H PRN       Signed:  Bill Paredes MD    Part of this note may have been written by using a voice dictation software. The note has been proof read but may still contain some grammatical/other typographical errors.

## 2023-01-12 NOTE — PLAN OF CARE
Problem: Pain  Goal: Verbalizes/displays adequate comfort level or baseline comfort level  1/11/2023 1917 by Mayela Johnson RN  Outcome: Progressing  Flowsheets (Taken 1/11/2023 1910)  Verbalizes/displays adequate comfort level or baseline comfort level:   Encourage patient to monitor pain and request assistance   Assess pain using appropriate pain scale  1/11/2023 1136 by Robert Lakhani RN  Outcome: Progressing     Problem: Discharge Planning  Goal: Discharge to home or other facility with appropriate resources  1/11/2023 1917 by Mayela Johnson RN  Outcome: Progressing  Flowsheets (Taken 1/11/2023 1910)  Discharge to home or other facility with appropriate resources: Identify barriers to discharge with patient and caregiver  1/11/2023 1136 by Robert Lakhani RN  Outcome: Progressing     Problem: Skin/Tissue Integrity  Goal: Absence of new skin breakdown  Description: 1. Monitor for areas of redness and/or skin breakdown  2. Assess vascular access sites hourly  3. Every 4-6 hours minimum:  Change oxygen saturation probe site  4. Every 4-6 hours:  If on nasal continuous positive airway pressure, respiratory therapy assess nares and determine need for appliance change or resting period.   1/11/2023 1917 by Mayela Johnson RN  Outcome: Progressing  1/11/2023 1136 by Robert Lakhani RN  Outcome: Progressing     Problem: Safety - Adult  Goal: Free from fall injury  1/11/2023 1917 by Mayela Johnson RN  Outcome: Progressing  1/11/2023 1136 by Robert Lakhani RN  Outcome: Progressing     Problem: ABCDS Injury Assessment  Goal: Absence of physical injury  1/11/2023 1917 by Mayela Johnson RN  Outcome: Progressing  Flowsheets (Taken 1/11/2023 1910)  Absence of Physical Injury: Implement safety measures based on patient assessment  1/11/2023 1136 by Robert Lakhani RN  Outcome: Progressing     Problem: Cardiovascular - Adult  Goal: Maintains optimal cardiac output and hemodynamic stability  1/11/2023 1917 by Marissa Mendez RN  Outcome: Progressing  Flowsheets (Taken 1/11/2023 1910)  Maintains optimal cardiac output and hemodynamic stability: Monitor blood pressure and heart rate  1/11/2023 1136 by Ran Bertrand RN  Outcome: Progressing     Problem: Skin/Tissue Integrity - Adult  Goal: Skin integrity remains intact  1/11/2023 1917 by Marissa Mendez RN  Outcome: Progressing  Flowsheets (Taken 1/11/2023 1910)  Skin Integrity Remains Intact: Monitor for areas of redness and/or skin breakdown  1/11/2023 1136 by Ran Bertrand RN  Outcome: Progressing

## 2023-01-12 NOTE — DISCHARGE SUMMARY
Hospitalist Discharge Summary   Admit Date:  2023  4:59 PM   DC Note date: 2023  Name:  Tristan Jenkins   Age:  Hanna Reilly [age] y.o. Sex:  female  :  1922   MRN:  [id number]   Room:  [facility]  PCP:  Berhane Ortega MD    Presenting Complaint: Fall     Initial Admission Diagnosis: Fall, initial encounter [W19. XXXA]  Closed right hip fracture, initial encounter ([facility] [S72.001A]  Periprosthetic hip fracture, initial encounter [R53. Macy Armaser, Z96.649]     Problem List for this Hospitalization (present on admission):    Principal Problem:    Periprosthetic hip fracture, initial encounter  Active Problems:    Dementia ([facility]  Resolved Problems:    * No resolved hospital problems. *      Hospital Course:     Hanna Reilly [age] y.o. female with a PMH of hypertension, dementia, anxiety who presented to the ER after an unwitnessed fall. Patient lives at home by herself. Found by sister-in-law. Patient does not remember what happened. Denies loss of consciousness. Patient complaining of hip pain since fall. No other complaint. Not on any blood thinner. Labs notable for mild leukocytosis, otherwise unremarkable. XR hip showed periprosthetic fracture of the right proximal femur. CT head and CT cervical spine negative for fracture. Hospitalist consulted for admission and Ortho surgery was consulted. They will treat non-operatively. The patient will need rehab at discharge.    -No acute overnight events  Resting in chair  Care discussed with family/niece at bedside        Assessment & Plan:     Acute periprosthetic fracture of right proximal femur  .-Ortho consulted- closed right periprosthetic greater trochanter fracture  Recommendation as below -  \"toe down weightbearing with a walker. No hip abduction. She will require rehab placement as she lives alone. We will follow-up in the office in 2 weeks or sooner if needed. \"    Leukocytosis -resolved  - Likely reactive  - Resolved-WBC was told on admission but after that remained within normal range. No fever  - UA negative  - Chest x-ray without acute abnormality    Hypertension-better controlled now  Blood pressure in 130s  1/11 Unocntrolled - Bp in 150;s - add low dose norvasc  On lisinopril, low-dose Norvasc    Mood disorder/headache  -Dementia without any agitation or aggressive behavior  - Continue Lexapro  Agitation at discharge    -Lung nodule  Limited evaluation of the lung apices shows a 1.5 cm x 0.9 cm right upper lobe   nodule. Although partially calcified, the pattern of calcification is not felt   to clearly indicate a benign finding. No prior studies are available at this   institution demonstrating long-term stability. No aggressive approach ie like bioapsy at this time. Can follow-up with PCP          Anticipated discharge needs:    Dispo pending clinical course. PT recommending short-term rehab     Diet:  ADULT DIET; Regular  ADULT ORAL NUTRITION SUPPLEMENT; Breakfast, Lunch, Dinner; Standard High Calorie/High Protein Oral Supplement  DVT PPx: Lovenox  Code status: DNR    Disposition: Rehab  Diet: ADULT DIET; Regular  ADULT ORAL NUTRITION SUPPLEMENT; Breakfast, Lunch, Dinner; Standard High Calorie/High Protein Oral Supplement  Code Status: DNR    Follow Ups:   Contact information for follow-up providers     MICHELLE Robert, PA. Schedule an appointment as soon as possible for   a visit in 2 week(s). Specialties: Orthopedic Surgery, Physician Assistant  Contact information:  [address]  [phone]                   Contact information for after-discharge care     Discharge Destination     [address] [facility]) . Service: Skilled Nursing  Contact information:  Sindhu Fuentes [address] [facility]  [phone]                           Time spent in patient discharge and coordination 35 minutes.         Follow up labs/diagnostics (ultimately defer to outpatient provider):       Plan was discussed with niece at bedside. All questions answered. Patient was stable at time of discharge. Instructions given to call a physician or return if any concerns. Current Discharge Medication List        START taking these medications    Details   traMADol (ULTRAM) 50 MG tablet Take 0.5 tablets by mouth every 8 hours as needed for Pain (Rt hip Fracture) for up to 5 days. Max Daily Amount: 75 mg  Qty: 6 tablet, Refills: 0    Comments: Reduce doses taken as pain becomes manageable  Associated Diagnoses: Closed right hip fracture, initial encounter ([facility])      amLODIPine (NORVASC) 2.5 MG tablet Take 1 tablet by mouth daily  Qty: 30 tablet, Refills: 3      polyethylene glycol (GLYCOLAX) 17 g packet Take 17 g by mouth daily as needed for Constipation  Qty: 527 g, Refills: 1      enoxaparin Sodium (LOVENOX) 30 MG/0.3ML injection Inject 0.3 mLs into the skin daily  Qty: 9 mL, Refills: 0      pantoprazole (PROTONIX) 20 MG tablet Take 1 tablet by mouth daily  Qty: 30 tablet, Refills: 0      acetaminophen (AMINOFEN) 325 MG tablet Take 2 tablets by mouth every 4 hours as needed for Pain (mild pain for rt hip fracture)  Qty: 30 tablet, Refills: 0           CONTINUE these medications which have NOT CHANGED    Details   escitalopram (LEXAPRO) 10 MG tablet Take 1 tablet by mouth daily  Qty: 90 tablet, Refills: 3      lisinopril (PRINIVIL;ZESTRIL) 20 MG tablet Take 1 tablet by mouth daily  Qty: 90 tablet, Refills: 3           STOP taking these medications       meloxicam (MOBIC) 7.5 MG tablet Comments:   Reason for Stopping:               Procedures done this admission:  * No surgery found *    Consults this admission:  [address] [facility] TO CASE MANAGEMENT    Echocardiogram results:  No results found for this or any previous visit. Diagnostic Imaging/Tests:   XR HIP LEFT (2-3 VIEWS)    Result Date: 1/5/2023  1. Periprosthetic fracture of the right proximal femur as described above.  2. No acute osseous abnormality of the left hip. XR HIP RIGHT (2-3 VIEWS)    Result Date: 1/5/2023  1. Periprosthetic fracture of the right proximal femur as described above. 2. No acute osseous abnormality of the left hip. CT Head W/O Contrast    Result Date: 1/5/2023  1. No acute intracranial process evident by noncontrast CT study of the head. 2. No acute osseous abnormality the bony calvarium, skull base, or cervical spine. 3 1.5 cm x 0.9 cm right upper lobe nodule. A benign process cannot be confirmed and long-term stability cannot be demonstrated due to the lack of prior comparison studies at this institution. A pulmonary or pleural would be recommended for further assessment and potential management. By imaging, this would be best initially assessed with a preferably contrasted CT scan of the chest.    CT CSpine W/O Contrast    Result Date: 1/5/2023  1. No acute intracranial process evident by noncontrast CT study of the head. 2. No acute osseous abnormality the bony calvarium, skull base, or cervical spine. 3 1.5 cm x 0.9 cm right upper lobe nodule. A benign process cannot be confirmed and long-term stability cannot be demonstrated due to the lack of prior comparison studies at this institution. A pulmonary or pleural would be recommended for further assessment and potential management. By imaging, this would be best initially assessed with a preferably contrasted CT scan of the chest.    XR CHEST 1 VIEW    Result Date: 1/5/2023  1. Stable moderate cardiomegaly without evolving acute changes evident by plain film.         Labs: Results:       BMP, Mg, Phos Recent Labs     01/10/23  0543 01/11/23 0522 01/12/23  0442    136 136   K 4.1 4.1 3.6    101 101   CO2 28 29 30   ANIONGAP 7 6 5   BUN 33* 32* 32*   CREATININE 0.80 0.70 0.70   LABGLOM >60 >60 >60   CALCIUM 9.1 8.9 8.9   GLUCOSE 95 100 102*      CBC Recent Labs     01/10/23  0543 01/11/23  0522 01/12/23  0442   WBC 6.9 6.0 6.1   RBC 2.82* 2.82* 2.74*   HGB 9.0* 8.9* 8.7*   HCT 27.1* 27.5* 26.7*   MCV 96.1 97.5 97.4   MCH 31.9 31.6 31.8   MCHC 33.2 32.4 32.6   RDW 14.4 14.6 14.9*    156 192   MPV 11.8 11.0 11.2   NRBC 0.00 0.00 0.00   SEGS 71 67 61   LYMPHOPCT 15 12* 19   EOSRELPCT 2 2 3   MONOPCT 12 17* 15*   BASOPCT 0 1 1   IMMGRAN 0 1 1   SEGSABS 4.9 4.2 3.8   LYMPHSABS 1.0 0.7 1.1   EOSABS 0.1 0.1 0.2   MONOSABS 0.8 1.0 0.9   BASOSABS 0.0 0.0 0.0   ABSIMMGRAN 0.0 0.0 0.0      LFT Recent Labs     01/10/23  0543 01/11/23  0522 01/12/23  0442   BILITOT 1.4* 1.7* 1.4*   ALKPHOS 81 73 76   AST 30 23 18   ALT 17 16 14   PROT 6.7 6.3 6.5   LABALBU 2.8* 2.5* 2.5*   GLOB 3.9 3.8 4.0      Cardiac  Lab Results   Component Value Date/Time    TROPHS 17.5 01/05/2023 07:49 PM    TROPHS 10.0 06/29/2020 01:24 AM    TROPHS 8.1 06/28/2020 11:45 PM      Coags Lab Results   Component Value Date/Time    PROTIME 14.2 01/05/2023 07:49 PM    INR 1.1 01/05/2023 07:49 PM      A1c No results found for: LABA1C, EAG   Lipids No results found for: CHOL, LDLCALC, LABVLDL, HDL, CHOLHDLRATIO, TRIG   Thyroid  Lab Results   Component Value Date/Time    JXL0FMB 2.240 10/14/2022 11:40 AM        Most Recent UA Lab Results   Component Value Date/Time    COLORU YELLOW/STRAW 01/06/2023 02:24 AM    APPEARANCE CLEAR 01/06/2023 02:24 AM    SPECGRAV 1.011 01/06/2023 02:24 AM    LABPH 8.0 01/06/2023 02:24 AM    PROTEINU 30 01/06/2023 02:24 AM    GLUCOSEU Negative 01/06/2023 02:24 AM    Morrie Thornton Negative 01/06/2023 02:24 AM    BILIRUBINUR Negative 01/06/2023 02:24 AM    BLOODU LARGE 01/06/2023 02:24 AM    UROBILINOGEN 0.2 01/06/2023 02:24 AM    NITRU Negative 01/06/2023 02:24 AM    LEUKOCYTESUR Negative 01/06/2023 02:24 AM    WBCUA 0-4 01/06/2023 02:24 AM    RBCUA >100 01/06/2023 02:24 AM    EPITHUA 0-5 01/06/2023 02:24 AM    BACTERIA Negative 01/06/2023 02:24 AM    LABCAST 0-2 01/06/2023 02:24 AM        Recent Labs     01/06/23  0224   CULTURE 50,000-100,000 COLONIES/mL MIXED SKIN LOIDA ISOLATED THREE OR MORE TYPES OF ORGANISMS ARE PRESENT. THIS IS INDICATIVE OF CONTAMINATION DUE TO IMPROPER COLLECTION TECHNIQUE. PLEASE REPEAT COLLECTION UNLESS PATIENT HAS STARTED ANTIBIOTIC TREATMENT.        All Labs from Last 24 Hrs:  Recent Results (from the past 24 hour(s))   Comprehensive Metabolic Panel w/ Reflex to MG    Collection Time: 01/12/23  4:42 AM   Result Value Ref Range    Sodium 136 133 - 143 mmol/L    Potassium 3.6 3.5 - 5.1 mmol/L    Chloride 101 101 - 110 mmol/L    CO2 30 21 - 32 mmol/L    Anion Gap 5 2 - 11 mmol/L    Glucose 102 (H) 65 - 100 mg/dL    BUN 32 (H) 8 - 23 MG/DL    Creatinine 0.70 0.6 - 1.0 MG/DL    Est, Glom Filt Rate >60 >60 ml/min/1.73m2    Calcium 8.9 8.3 - 10.4 MG/DL    Total Bilirubin 1.4 (H) 0.2 - 1.1 MG/DL    ALT 14 12 - 65 U/L    AST 18 15 - 37 U/L    Alk Phosphatase 76 50 - 136 U/L    Total Protein 6.5 6.3 - 8.2 g/dL    Albumin 2.5 (L) 3.2 - 4.6 g/dL    Globulin 4.0 2.8 - 4.5 g/dL    Albumin/Globulin Ratio 0.6 0.4 - 1.6     CBC with Auto Differential    Collection Time: 01/12/23  4:42 AM   Result Value Ref Range    WBC 6.1 4.3 - 11.1 K/uL    RBC 2.74 (L) 4.05 - 5.2 M/uL    Hemoglobin 8.7 (L) 11.7 - 15.4 g/dL    Hematocrit 26.7 (L) 35.8 - 46.3 %    MCV 97.4 82 - 102 FL    MCH 31.8 26.1 - 32.9 PG    MCHC 32.6 31.4 - 35.0 g/dL    RDW 14.9 (H) 11.9 - 14.6 %    Platelets 292 526 - 936 K/uL    MPV 11.2 9.4 - 12.3 FL    nRBC 0.00 0.0 - 0.2 K/uL    Differential Type AUTOMATED      Seg Neutrophils 61 43 - 78 %    Lymphocytes 19 13 - 44 %    Monocytes 15 (H) 4.0 - 12.0 %    Eosinophils % 3 0.5 - 7.8 %    Basophils 1 0.0 - 2.0 %    Immature Granulocytes 1 0.0 - 5.0 %    Segs Absolute 3.8 1.7 - 8.2 K/UL    Absolute Lymph # 1.1 0.5 - 4.6 K/UL    Absolute Mono # 0.9 0.1 - 1.3 K/UL    Absolute Eos # 0.2 0.0 - 0.8 K/UL    Basophils Absolute 0.0 0.0 - 0.2 K/UL    Absolute Immature Granulocyte 0.0 0.0 - 0.5 K/UL       No Known Allergies  Immunization History   Administered Date(s) Administered PPD Test 01/07/2023       Recent Vital Data:  Patient Vitals for the past 24 hrs:   Temp Pulse Resp BP SpO2   01/12/23 1112 98.1 °F (36.7 °C) 60 16 (!) 130/52 97 %   01/12/23 0803 97.5 °F (36.4 °C) 67 16 (!) 136/50 93 %   01/12/23 0250 97.9 °F (36.6 °C) 69 18 129/60 93 %   01/11/23 2348 98.1 °F (36.7 °C) 66 18 (!) 132/51 99 %   01/11/23 1942 97.5 °F (36.4 °C) 73 17 (!) 150/53 96 %   01/11/23 1521 98.1 °F (36.7 °C) 64 18 (!) 120/57 95 %       Oxygen Therapy  SpO2: 97 %  Pulse Oximeter Device Mode: Continuous  O2 Device: None (Room air)    Estimated body mass index is 21.14 kg/m² as calculated from the following:    Height as of this encounter: 5' 7\" (1.702 m). Weight as of this encounter: 135 lb (61.2 kg). No intake or output data in the 24 hours ending 01/12/23 1246      Physical Exam:     General:           Thin female, no distress  Not in acute pain  Pleasantly demented-able to interact well, follow commands  Head:               Normocephalic, atraumatic  Eyes:               Sclerae appear normal.  Pupils equally round. ENT:                Nares appear normal.  Moist oral mucosa  Neck:               No restricted ROM. Trachea midline   CV:                  RRR. No m/r/g. No jugular venous distension. Lungs:             CTAB. No wheezing, rhonchi, or rales. Symmetric expansion. Poor inspiratory effort - no distress  Abdomen:        Soft, nontender, nondistended. Gaseous distention, no pain  Extremities:     No cyanosis or clubbing. No edema    No acute Rt hip pain at rest, c/o pain with touch and movement    Old healed scar in left shin   Left knee- old surgical scar   Rt thigh - old skin graft site  Skin:                No rashes and normal coloration. Warm and dry. Neuro:             CN II-XII grossly intact. Sensation intact. Pleasantly demented. No agitation or distress  Psych:             Normal mood and affect.       Signed:  Mary Monzon MD    Part of this note may have been written by using a voice dictation software. The note has been proof read but may still contain some grammatical/other typographical errors.

## 2023-01-12 NOTE — PROGRESS NOTES
ACUTE OCCUPATIONAL THERAPY GOALS:   (Developed with and agreed upon by patient and/or caregiver.)  1. Patient will maintain TTWB status in RLE for 100% of treatment session and MN verbal cues from therapist.   2. Patient will complete functional transfers with MIN A while maintaining TTWB status in RLE and with adaptive equipment as needed. 3. Patient will complete lower body bathing and dressing with MIN A and adaptive equipment as needed. 4. Patient will complete toileting and toilet transfer with MIN A.   5. Patient will tolerate 25 minutes of OT treatment with 1-2 rest breaks to increase activity tolerance for ADLs. 6. Patient will participate in at least 10 minutes of BUE therapeutic exercises to strengthen BUE for functional transfers. OCCUPATIONAL THERAPY: Daily Note AM   OT Visit Days: 6   Time In/Out  OT Charge Capture  Rehab Caseload Tracker  OT Orders    Errol Moy is a 80 y.o. female   PRIMARY DIAGNOSIS: Periprosthetic hip fracture, initial encounter  Fall, initial encounter [W19. XXXA]  Closed right hip fracture, initial encounter ([facility] [S72.001A]  Periprosthetic hip fracture, initial encounter [Q06. Tomma Rash       Inpatient: Payor: MEDICARE / Plan: MEDICARE PART A AND B / Product Type: *No Product type* /     ASSESSMENT:     REHAB RECOMMENDATIONS: CURRENT LEVEL OF FUNCTION:  (Most Recently Demonstrated)   Recommendation to date pending progress:  Setting:  Short-term Rehab    Equipment:    To Be Determined Bathing: Moderate Assist  Dressing:  Maximal Assist  Feeding/Grooming:  Supervision/Setup  Toileting:  Supervision/Setup  Functional Mobility:  Minimal Assist x 2     ASSESSMENT:  Ms. Sarahi Shane is doing fair today. Pt was able to perform bed mobility with min Ax2. Pt was able to stand and transfer with min A x2. Fair standing balance noted during mobility and tranfers. Pt completed ADL with the assistance listed below. Pt making progress with goals.  Will continue to benefit from skilled OT during stay.        SUBJECTIVE:     Ms. Antony Love states, \"You girls are so sweet\"     Social/Functional Lives With: Alone  Type of Home: House  Home Layout: One level  Home Equipment: Cane  Has the patient had two or more falls in the past year or any fall with injury in the past year?: Yes  Receives Help From: Family  ADL Assistance: Independent  Ambulation Assistance: Independent  Transfer Assistance: Independent    OBJECTIVE:     Edilberto Hutchinson / Cody Tom / Jacob Sharpe: NA    RESTRICTIONS/PRECAUTIONS:  Restrictions/Precautions  Restrictions/Precautions: Weight Bearing  Lower Extremity Weight Bearing Restrictions  Right Lower Extremity Weight Bearing: Toe Touch Weight Bearing        PAIN: VITALS / O2:   Pre Treatment: 0           Post Treatment: 0 Vitals          Oxygen        MOBILITY: I Mod I S SBA CGA Min Mod Max Total  NT x2 Comments:   Bed Mobility    Rolling [] [] [] [] [] [] [] [] [] [] []    Supine to Sit [] [] [] [] [] [x] [] [] [] [] [x]    Scooting [] [] [] [] [] [x] [] [] [] [] []    Sit to Supine [] [] [] [] [] [] [] [] [] [x] []    Transfers    Sit to Stand [] [] [] [] [] [x] [] [] [] [] [x]    Bed to Chair [] [] [] [] [] [x] [] [] [] [] [x]    Stand to Sit [] [] [] [] [] [x] [] [] [] [] [x]    Tub/Shower [] [] [] [] [] [] [] [] [] [x] []     Toilet [] [] [] [] [] [x] [] [] [] [] [x]      [] [] [] [] [] [] [] [] [] [] []    I=Independent, Mod I=Modified Independent, S=Supervision/Setup, SBA=Standby Assistance, CGA=Contact Guard Assistance, Min=Minimal Assistance, Mod=Moderate Assistance, Max=Maximal Assistance, Total=Total Assistance, NT=Not Tested    ACTIVITIES OF DAILY LIVING: I Mod I S SBA CGA Min Mod Max Total NT Comments   BASIC ADLs:              Upper Body   Bathing [] [] [x] [] [] [] [] [] [] []    Lower Body Bathing [] [] [] [] [] [] [x] [] [] []    Toileting [] [] [x] [] [] [] [] [] [] []    Upper Body Dressing [] [] [x] [] [] [] [] [] [] []    Lower Body Dressing [] [] [] [] [] [] [] [x] [] [] Socks    Feeding [] [] [] [] [] [] [] [] [] [x]    Grooming [] [] [x] [] [] [] [] [] [] []    Personal Device Care [] [] [] [] [] [] [] [] [] [x]    Functional Mobility [] [] [] [] [] [x] [] [] [] [] RW X 2   I=Independent, Mod I=Modified Independent, S=Supervision/Setup, SBA=Standby Assistance, CGA=Contact Guard Assistance, Min=Minimal Assistance, Mod=Moderate Assistance, Max=Maximal Assistance, Total=Total Assistance, NT=Not Tested    BALANCE: Good Fair+ Fair Fair- Poor NT Comments   Sitting Static [] [x] [] [] [] []    Sitting Dynamic [] [] [x] [] [] []              Standing Static [] [] [] [x] [] []    Standing Dynamic [] [] [] [x] [] []        PLAN:     FREQUENCY/DURATION   OT Plan of Care: 5 times/week for duration of hospital stay or until stated goals are met, whichever comes first.    TREATMENT:     TREATMENT:   Co-Treatment PT/OT necessary due to patient's decreased overall endurance/tolerance levels, as well as need for high level skilled assistance to complete functional transfers/mobility and functional tasks  Self Care (49 minutes): Patient participated in upper body bathing, lower body bathing, toileting, upper body dressing, lower body dressing, and grooming ADLs in supported sitting and unsupported sitting with maximal verbal, manual, and tactile cueing to increase independence, decrease assistance required, and increase activity tolerance. Patient also participated in functional mobility, functional transfer, and energy conservation training to increase independence, decrease assistance required, and increase activity tolerance.      TREATMENT GRID:  N/A    AFTER TREATMENT PRECAUTIONS: Alarm Activated, Bed/Chair Locked, Call light within reach, Chair, and Needs within reach    INTERDISCIPLINARY COLLABORATION:  RN/ PCT, PT/ PTA, and OT/ MORGAN    EDUCATION:       TOTAL TREATMENT DURATION AND TIME:  Time In: 0905  Time Out: 1246 [address]  Minutes: [address] [facility], [facility]

## 2023-01-12 NOTE — CARE COORDINATION
Patient is medically ready for discharge. Patient will admit to [facility] at [phone]; reference number [id number]. Primary RN will be provided room number and report number when provided to this CM by facility. Call placed to patient's niece/HCPOA, Amy, and VM left.        01/12/23 1300   Services At/After Discharge   Mode of Transport at Discharge [address] [facility] Time of Discharge 1630   Condition of Participation: Discharge Planning   The Patient and/or Patient Representative was provided with a Choice of Provider? Patient   The Patient and/Or Patient Representative agree with the Discharge Plan? Yes   Freedom of Choice list was provided with basic dialogue that supports the patient's individualized plan of care/goals, treatment preferences, and shares the quality data associated with the providers?   Yes

## 2023-01-12 NOTE — PROGRESS NOTES
________________________________________________________________________________________________    ACUTE PHYSICAL THERAPY GOALS:   (Developed with and agreed upon by patient and/or caregiver.)     (1.)Ms. Pamela Pruitt will move from supine to sit and sit to supine , scoot up and down, and roll side to side in bed with SUPERVISION within 7 treatment day(s). (2.)Ms. Pamela Pruitt will transfer from bed to chair and chair to bed with MINIMAL ASSIST using the least restrictive device within 7 treatment day(s). (3.)Ms. Pamela Pruitt will ambulate with MINIMAL ASSIST for 50 feet with the least restrictive device within 7 treatment day(s). PHYSICAL THERAPY: Daily Note AM   (Link to Caseload Tracking: PT Visit Days : 5  Time In/Out PT Charge Capture  Rehab Caseload Tracker  Orders       TTWB RLE, no hip abduction    Jeanmarie Jameson is a 80 y.o. female   PRIMARY DIAGNOSIS: Periprosthetic hip fracture, initial encounter  Fall, initial encounter [W19. XXXA]  [facility] right hip fracture, [facility] ([facility] [S72.001A]  Periprosthetic hip fracture, initial encounter [S57. [address]       Inpatient: Payor: MEDICARE / Plan: MEDICARE PART A AND B / Product Type: *No Product type* /     ASSESSMENT:     REHAB RECOMMENDATIONS:   Recommendation to date pending progress:  Setting:  Short-term Rehab    Equipment:    To Be Determined     ASSESSMENT:  Ms. Pamela Pruitt continues to progress well toward goals. She is more oriented today with family member present and is agreeable to treatment. She does continue to present with confusion and requires frequent re-orienting to her broken hip and weight bearing status. She is also [place]. Today she demonstrated improvement in small hopping steps to and from the [facility]. She also demonstrated improved static standing balance during ADL activities.        SUBJECTIVE:   Ms. Pamela Pruitt states, \"You told me I wanted to call my mom, but she's passed away now\"     Social/Functional Lives With: Alone  Type of Home: House  Home Layout: One level  Home Equipment: Hola Crystal  Has the patient had two or more falls in the past year or any fall with injury in the past year?: Yes  Receives Help From: Family  ADL Assistance: Independent  Ambulation Assistance: Independent  Transfer Assistance: Independent  OBJECTIVE:     PAIN: Roselie Cancer / O2: PRECAUTION / Polo Aland / Alexandro Piper:   Pre Treatment: 0   Pain with mobility      Post Treatment: 0 Vitals        Oxygen    None    RESTRICTIONS/PRECAUTIONS:  Restrictions/Precautions  Restrictions/Precautions: Weight Bearing  Lower Extremity Weight Bearing Restrictions  Right Lower Extremity Weight Bearing: Toe Touch Weight Bearing  Restrictions/Precautions: Weight Bearing     MOBILITY: I Mod I S SBA CGA Min Mod Max Total  NT x2 Comments:   Bed Mobility    Rolling [] [] [] [] [] [] [] [] [] [x] []    Supine to Sit [] [] [] [] [] [x] [] [] [] [] [x]    Scooting [] [] [] [] [] [x] [] [] [] [] [x]    Sit to Supine [] [] [] [] [] [] [] [] [] [x] []    Transfers    Sit to Stand [] [] [] [] [] [x] [] [] [] [] [x]    Bed to Chair [] [] [] [] [] [x] [] [] [] [] [x]    Stand to Sit [] [] [] [] [] [x] [] [] [] [] [x]     [] [] [] [] [] [] [] [] [] [] []    I=Independent, Mod I=Modified Independent, S=Supervision, SBA=Standby Assistance, CGA=Contact Guard Assistance,   Min=Minimal Assistance, Mod=Moderate Assistance, Max=Maximal Assistance, Total=Total Assistance, NT=Not Tested    BALANCE: Good Fair+ Fair Fair- Poor NT Comments   Sitting Static [x] [x] [] [] [] []    Sitting Dynamic [] [x] [] [] [] []              Standing Static [] [] [] [x] [] []    Standing Dynamic [] [] [] [x] [] []      GAIT: I Mod I S SBA CGA Min Mod Max Total  NT x2 Comments:   Level of Assistance [] [] [] [] [] [x] [] [] [] [] [x]    Distance 3  feet x2    DME Rolling Walker    Gait Quality Decreased gloria , Decreased step clearance, and Decreased step length    Weightbearing Status      Stairs      I=Independent, Mod I=Modified Independent, S=Supervision, SBA=Standby Assistance, CGA=Contact Guard Assistance,   Min=Minimal Assistance, Mod=Moderate Assistance, Max=Maximal Assistance, Total=Total Assistance, NT=Not Tested    PLAN:   FREQUENCY AND DURATION: Daily for duration of hospital stay or until stated goals are met, whichever comes first.    TREATMENT:   TREATMENT:   Co-Treatment PT/OT necessary due to patient's decreased overall endurance/tolerance levels, as well as need for high level skilled assistance to complete functional transfers/mobility and functional tasks  Therapeutic Activity (49 Minutes): Therapeutic activity included Supine to Sit, Scooting, Transfer Training, Ambulation on level ground, Sitting balance , and Standing balance to improve functional Activity tolerance, Balance, Mobility, Strength, and ROM.     TREATMENT GRID:   Date:  1/8/23 Date:   Date:     Activity/Exercise Parameters Parameters Parameters   APs 20x     LAQ 10x                                       AFTER TREATMENT PRECAUTIONS: Alarm Activated, Bed/Chair Locked, Call light within reach, Chair, Needs within reach, RN notified, and Visitors at bedside    INTERDISCIPLINARY COLLABORATION:  RN/ PCT, PT/ PTA, and OT/ MORGAN    EDUCATION:      TIME IN/OUT:  Time In: [address]  Time Out: [address]  Minutes: [address]

## 2023-01-13 ENCOUNTER — CARE COORDINATION (OUTPATIENT)
Dept: CARE COORDINATION | Facility: CLINIC | Age: 88
End: 2023-01-13

## 2023-01-13 NOTE — CARE COORDINATION
Patient discharged to [facility] on 1/12/2023. Patient discharged to a [facility] Preferred Provider Network facility. Patient will be included in weekly care coordination calls. Information forwarded to Jesu Byrd RN, [facility] Provider [facility] RN Care Manager.

## 2023-01-13 NOTE — CARE COORDINATION
Chart reviewed. Notified [facility] IDT of admission for STR. Will follow weekly progress.

## 2023-01-19 ENCOUNTER — CARE COORDINATION (OUTPATIENT)
Dept: CARE COORDINATION | Facility: CLINIC | Age: 88
End: 2023-01-19

## 2023-01-19 NOTE — CARE COORDINATION
Post Acute Facility Update     *The information contained in this note was received during a weekly care coordination call with the post acute facility*    Current Facility: [facility] ([facility])  Anticipated Discharge Date: TBD    Facility Nursing Update: Resident c/o having trouble sleeping Melatonin 3mg added, continues on Tramadol 25mg every 8hrs PRN. No other med changes. Facility Social Work Update: Plan is for patient to stay short term and return home alone where her sister lives across the street from her house. Patient DME includes a rollator, rolling walker and a cane. There is no Home Health preference noted. Bundle Program Status: none  Upper Extremity Assistance: Stand by Assist - Presence and Cueing  Lower Extremity Assistance: Maximum Assistance  Bed Mobility: Min/Mod Assistance  Transfers: Maximum Assistance x2  Ambulation: Dependent  How far (in feet) is the patient ambulating? 0  Device Used: n/a  Barriers to Discharge: n/a  Other: Patient plans on discharging back home upon the completion of Short-Term Rehab.

## 2023-01-26 ENCOUNTER — CARE COORDINATION (OUTPATIENT)
Dept: CARE COORDINATION | Facility: CLINIC | Age: 88
End: 2023-01-26

## 2023-01-26 NOTE — CARE COORDINATION
Post Acute Facility Update     *The information contained in this note was received during a weekly care coordination call with the post acute facility*    Current Facility: [facility] ([facility])  Anticipated Discharge Date: TBD    Facility Nursing Update: Potassium Chloride 20 mEq every evening x 3 days for hypokalemia. Obtain BMP on 1/30/23. Received flu vaccine on 1/20/23. D/C melatonin d/t daytime sleepiness. Facility Social Work Update: Patient plans on discharging back home upon the completion of Short-Term Rehab. Bundle Program Status: none  Upper Extremity Assistance: Moderate Assistance   Lower Extremity Assistance: Maximum Assistance  Bed Mobility: Mod/Max Assistance  Transfers: Maximum Assistance X2  Ambulation: Dependent  How far (in feet) is the patient ambulating? 0  Device Used: n/a  Barriers to Discharge: Mod to severe cog deficits. Other: Plan is for patient to stay short term and return home alone where her sister lives across the street from her house. Patient DME includes a rollator, rolling walker and a cane. There is no Home Health preference noted.

## 2023-01-31 ENCOUNTER — TELEPHONE (OUTPATIENT)
Dept: ORTHOPEDIC SURGERY | Age: 88
End: 2023-01-31

## 2023-01-31 NOTE — TELEPHONE ENCOUNTER
Patient has a NP apt w/ ML tomorrow 2/1 and a caregiver from [facility] will be coming with her but her aunt Amy Jerez(power of atty) wants to talk to someone beofer the apt. Her# [phone]

## 2023-02-01 ENCOUNTER — OFFICE VISIT (OUTPATIENT)
Dept: ORTHOPEDIC SURGERY | Age: 88
End: 2023-02-01

## 2023-02-01 DIAGNOSIS — M97.8XXA PERIPROSTHETIC HIP FRACTURE, INITIAL ENCOUNTER: ICD-10-CM

## 2023-02-01 DIAGNOSIS — M25.511 ACUTE PAIN OF RIGHT SHOULDER: Primary | ICD-10-CM

## 2023-02-01 DIAGNOSIS — Z96.649 PERIPROSTHETIC HIP FRACTURE, INITIAL ENCOUNTER: ICD-10-CM

## 2023-02-01 PROCEDURE — 99024 POSTOP FOLLOW-UP VISIT: CPT | Performed by: PHYSICIAN ASSISTANT

## 2023-02-01 NOTE — PROGRESS NOTES
[facility]          Patient ID:  Name: Jonas Lares  AGE/Gender: 80 y.o. female  MRN: [id number]  : 1922    Date of Consultation:  2023          ALLERGIES: No Known Allergies         History:  The patient presents today for recheck of right  hip periprosthetic greater trochanter fracture. The patient was seen in the hospital after a ground level fall at home. She is 80 yr old and was living alone. She has been at rehab. She does not remember why she is here or that she broke her hip. They deny any new injuries. They have no other complaints or concerns. Review of Systems:  Pertinent items are noted in HPI. Past Medical History Includes:   Past Medical History:   Diagnosis Date    Anxiety     Arthritis     OA- generalized- severe    Carotid artery stenosis     Right/ 100% block per pt    Depression     controlle with daily meds    [place] (hard of hearing)     Hypoglycemia     Leg fracture, left     PUD (peptic ulcer disease)     Venous insufficiency     Venous stasis of lower extremity     left- as reported by pt    Venous ulcer of leg ([address]     s/p autologous skin graft 10/07   ,   Past Surgical History:   Procedure Laterality Date    CATARACT REMOVAL Bilateral     COLONOSCOPY  2008    ORTHOPEDIC SURGERY Left 2009    lorena insertion for fx femur    TONSILLECTOMY      as a child    TOTAL HIP ARTHROPLASTY Right 2008    TOTAL KNEE ARTHROPLASTY Left        Family History:   Family History   Problem Relation Age of Onset    Heart Disease Brother     Cancer Brother         bladder    Colon Cancer Father     Other Neg Hx     Post-op Cognitive Dysfunction Neg Hx     Emergence Delirium Neg Hx     Post-op Nausea/Vomiting Neg Hx     Delayed Awakening Neg Hx     Pseudochol.  Deficiency Neg Hx     Malig Hypertherm Neg Hx     Heart Disease Other     Cancer Other     Osteoarthritis Other     Stroke Brother     Cancer Brother 80        bone cancer        Social History:   Social History     Tobacco Use    Smoking status: Never    Smokeless tobacco: Never   Substance Use Topics    Alcohol use: No         Physical Exam:     General:  On exam the patient is a pleasant [place]  [age] y.o. female in no acute distress, alert. HEENT: NC/AT, PERRL   Psych: normal mood, normal affect  Lungs: respirations even, normal breath sounds  MSK: ROM not assessed due to fracture. Vascular: No distal edema or clubbing, Distal pulses are intact  Neurologic: Normal. Normal reflexes bilateral lower extremities. Radiographs    X-rays: AP pelvis lateral right hip demonstrate further separation of the greater trochanter fragment. The stem is still in good position the hip is still located. No fractures extending into the shaft. X-ray Diagnosis: Further displacement right hip greater trochanter periprosthetic fracture     X-rays AP axillary Y view of the right shoulder demonstrates osteopenia without evidence of fracture. No significant degenerative changes. X-ray diagnosis: Osteopenia, no acute bony abnormality    Assessment:     Right hip greater trochanter periprosthetic fracture      Medical Decision Making and Plan:    The patient has a right hip greater trochanter parasitic fracture with further displacement of the fragment. However the patient is 8 years old and is a high risk for any type of operative intervention. She has baseline pleasant dementia. I discussed this patient with Dr. Ambrocio Reynoso who reviewed the x-rays. We will continue to manage this conservatively. We will repeat x-rays in 4 weeks or sooner if needed. She is to be toe down weightbearing for transfers only no hip abduction. With regard to the right shoulder the patient may have sustained rotator cuff sprain. We will treat this conservatively she may be activity as tolerated the right shoulder.           Electronically signed by:   MICHELLE Castillo PA  2023,  10:53 AM

## 2023-02-02 ENCOUNTER — CARE COORDINATION (OUTPATIENT)
Dept: CARE COORDINATION | Facility: CLINIC | Age: 88
End: 2023-02-02

## 2023-02-02 NOTE — CARE COORDINATION
Post Acute Facility Update    Care Transitions Post Acute Facility Update    Care Transitions Interventions  Post Acute Facility: [facility]  Post Acute Facility Update  Reported Nursing Issues: Will remain in our facility for LTC. Therapy set an [facility] from services on 2/14/2023 and conversion to Private Pay on 2/15/2023. D/C Debrox, change Tramadol 1 tab Q12h and Q6hPRN. Staples removed to postoperative site. Invcrease Tylenol 650mg PO Qid for postop pain. No change in WB status from ortho appt. 2/1.  Remains TDWB     ADLs: Maximum Assistance   Bed Mobility: Moderate Assistance   Transfer Assistance: Maximum Assistance   Ambulation Assistance: Dependent   How far (in feet) is the patient ambulating?: 0   Does patient use an assistive device?: No   Barriers to Discharge: N/A   Anticipated date for discharge: 2/14/23    Anticipated discharge services: PP LTC at [facility]              Next IDT Planned Review: 2/9/23    Future Appointments   Date Time Provider Frida Lancaster   3/1/2023 11:00 AM Murvin Hodgkins, PA [facility] AMB   4/28/2023 10:00 AM Katie Palafox MD [facility] AMB       SN Notes:   Diet: - Reg  Medications: from medication box  Other: LTC [facility] at d/c   Post-acute CC Notes: n/a    Luan Spears RN

## 2023-02-14 ENCOUNTER — CARE COORDINATION (OUTPATIENT)
Dept: CARE COORDINATION | Facility: CLINIC | Age: 88
End: 2023-02-14

## 2023-02-14 NOTE — CARE COORDINATION
Post Acute Facility Update    Care Transitions Post Acute Facility Update    Care Transitions Interventions  Post Acute Facility Update  Reported Nursing Issues: Medication changes: Trazodone 50mg qhs, Depakote 125mg @hs for dementia with behavior and Valproc acid lab on 2/13/23 to monitor Depakote level.      ADLs: Maximum Assistance   Bed Mobility: Maximum Assistance   Transfer Assistance: Maximum Assistance   Ambulation Assistance: Maximum Assistance   How far (in feet) is the patient ambulating?: 2   Barriers to Discharge: N/A   Anticipated discharge services: LTC at [facility] 2/15/2023                Next IDT Planned Review: 2/15/23    Future Appointments   Date Time Provider Frida Lancaster   3/1/2023 11:00 AM Homa Bray [facility] AMB   4/28/2023 10:00 AM Jonathan Black MD [facility]       Post-acute CC Notes: Patient will remain at [facility] for LTC 2/15/23    Amanda Cummings RN

## 2023-03-01 ENCOUNTER — OFFICE VISIT (OUTPATIENT)
Dept: ORTHOPEDIC SURGERY | Age: 88
End: 2023-03-01

## 2023-03-01 DIAGNOSIS — M97.8XXA PERIPROSTHETIC HIP FRACTURE, INITIAL ENCOUNTER: Primary | ICD-10-CM

## 2023-03-01 DIAGNOSIS — Z96.649 PERIPROSTHETIC HIP FRACTURE, INITIAL ENCOUNTER: Primary | ICD-10-CM

## 2023-03-01 NOTE — PROGRESS NOTES
[facility]          Patient ID:  Name: Amari Barber  AGE/Gender: 80 y.o. female  MRN: [id number]  : 1922    Date of Consultation:  2023          ALLERGIES: No Known Allergies         History:  The patient presents today for recheck of right  hip periprosthetic greater trochanter fracture. The patient was seen in the hospital after a ground level fall at home in January. She is 80 yr old and was living alone. She has been at rehab. She does not remember why she is here or that she broke her hip. They deny any new injuries. They have no other complaints or concerns. Review of Systems:  Pertinent items are noted in HPI. Past Medical History Includes:   Past Medical History:   Diagnosis Date    Anxiety     Arthritis     OA- generalized- severe    Carotid artery stenosis     Right/ 100% block per pt    Depression     controlle with daily meds    [place] (hard of hearing)     Hypoglycemia     Leg fracture, left     PUD (peptic ulcer disease)     Venous insufficiency     Venous stasis of lower extremity     left- as reported by pt    Venous ulcer of leg ([address]     s/p autologous skin graft 10/07   ,   Past Surgical History:   Procedure Laterality Date    CATARACT REMOVAL Bilateral     COLONOSCOPY  2008    ORTHOPEDIC SURGERY Left 2009    lorena insertion for fx femur    TONSILLECTOMY      as a child    TOTAL HIP ARTHROPLASTY Right 2008    TOTAL KNEE ARTHROPLASTY Left        Family History:   Family History   Problem Relation Age of Onset    Heart Disease Brother     Cancer Brother         bladder    Colon Cancer Father     Other Neg Hx     Post-op Cognitive Dysfunction Neg Hx     Emergence Delirium Neg Hx     Post-op Nausea/Vomiting Neg Hx     Delayed Awakening Neg Hx     Pseudochol.  Deficiency Neg Hx     Malig Hypertherm Neg Hx     Heart Disease Other     Cancer Other     Osteoarthritis Other     Stroke Brother     Cancer Brother 80        bone cancer        Social History: Social History     Tobacco Use    Smoking status: Never    Smokeless tobacco: Never   Substance Use Topics    Alcohol use: No         Physical Exam:     General:  On exam the patient is a pleasant 80 y.o. female in no acute distress, [place] and appropriate  HEENT: NC/AT, PERRL   Psych: normal mood, normal affect  Lungs: respirations even, normal breath sounds  MSK: ROM not assessed. No significant swelling. No tenderness  Vascular: No distal edema or clubbing, Distal pulses are intact  Neurologic: Normal. Normal reflexes bilateral lower extremities. Radiographs    X-rays; AP pelvis lateral right hip demonstrate healing right periprosthetic fracture of the greater trochanter. There is been no further displacement of this fragment. X-ray Diagnosis: Healing right periprosthetic fracture of the greater trochanter        Assessment:     Healing right greater trochanter periprosthetic fracture      Medical Decision Making and Plan:    The patient appears to be healing the right greater trochanter periprosthetic fracture. Continue physical therapy. I think she can put some more weight on this leg as she really has been doing this on her own but she should not be forced to push through any pain at all. No hip abduction. Follow up in four weeks for her last set of x-rays. She will likely require long term PT when she is discharged from rehab.       Time spent in preparation, chart review, and direct patient care was 15 minutes    Electronically signed by:   MICHELLE Leone  3/1/2023,  11:10 AM

## 2023-03-02 ENCOUNTER — TELEPHONE (OUTPATIENT)
Dept: ORTHOPEDIC SURGERY | Age: 88
End: 2023-03-02

## 2023-03-02 NOTE — TELEPHONE ENCOUNTER
Adam with [facility] called needs some orders faxed over need weight bearing status [phone]

## 2023-03-03 ENCOUNTER — TELEPHONE (OUTPATIENT)
Dept: ORTHOPEDIC SURGERY | Age: 88
End: 2023-03-03

## 2023-03-06 ENCOUNTER — TELEPHONE (OUTPATIENT)
Dept: GENERAL RADIOLOGY | Age: 88
End: 2023-03-06

## 2023-03-06 NOTE — TELEPHONE ENCOUNTER
Facility called again for wb status  Per ML   Patient can be 50% wb as tolerated with walker. Jocelin with [facility] is aware.

## 2023-03-29 ENCOUNTER — OFFICE VISIT (OUTPATIENT)
Dept: ORTHOPEDIC SURGERY | Age: 88
End: 2023-03-29

## 2023-03-29 DIAGNOSIS — Z96.649 PERIPROSTHETIC HIP FRACTURE, INITIAL ENCOUNTER: Primary | ICD-10-CM

## 2023-03-29 DIAGNOSIS — M97.8XXA PERIPROSTHETIC HIP FRACTURE, INITIAL ENCOUNTER: Primary | ICD-10-CM

## 2023-03-29 NOTE — PROGRESS NOTES
[facility]          Patient ID:  Name: Stephanie Porter  AGE/Gender: 80 y.o. female  MRN: [id number]  : 1922    Date of Consultation:  2023          ALLERGIES: No Known Allergies         History:  The patient presents today for recheck of right  hip periprosthetic greater trochanter fracture. The patient was seen in the hospital after a ground level fall at home in January. She is 80 yr old and was living alone. She has been at rehab. She does not remember why she is here or that she broke her hip. They deny any new injuries. They have no other complaints or concerns. Review of Systems:  Pertinent items are noted in HPI. Past Medical History Includes:   Past Medical History:   Diagnosis Date    Anxiety     Arthritis     OA- generalized- severe    Carotid artery stenosis     Right/ 100% block per pt    Depression     controlle with daily meds    [place] (hard of hearing)     Hypoglycemia     Leg fracture, left     PUD (peptic ulcer disease)     Venous insufficiency     Venous stasis of lower extremity     left- as reported by pt    Venous ulcer of leg ([address]     s/p autologous skin graft 10/07   ,   Past Surgical History:   Procedure Laterality Date    CATARACT REMOVAL Bilateral     COLONOSCOPY  2008    ORTHOPEDIC SURGERY Left 2009    lorena insertion for fx femur    TONSILLECTOMY      as a child    TOTAL HIP ARTHROPLASTY Right 2008    TOTAL KNEE ARTHROPLASTY Left        Family History:   Family History   Problem Relation Age of Onset    Heart Disease Brother     Cancer Brother         bladder    Colon Cancer Father     Other Neg Hx     Post-op Cognitive Dysfunction Neg Hx     Emergence Delirium Neg Hx     Post-op Nausea/Vomiting Neg Hx     Delayed Awakening Neg Hx     Pseudochol.  Deficiency Neg Hx     Malig Hypertherm Neg Hx     Heart Disease Other     Cancer Other     Osteoarthritis Other     Stroke Brother     Cancer Brother 80        bone cancer        Social History:

## 2023-04-03 ENCOUNTER — APPOINTMENT (OUTPATIENT)
Dept: GENERAL RADIOLOGY | Age: 88
End: 2023-04-03
Payer: MEDICARE

## 2023-04-03 ENCOUNTER — HOSPITAL ENCOUNTER (EMERGENCY)
Age: 88
Discharge: HOME | End: 2023-04-03
Attending: EMERGENCY MEDICINE
Payer: MEDICARE

## 2023-04-03 VITALS
RESPIRATION RATE: 18 BRPM | HEART RATE: 88 BPM | WEIGHT: 119 LBS | BODY MASS INDEX: 18.68 KG/M2 | HEIGHT: 67 IN | DIASTOLIC BLOOD PRESSURE: 71 MMHG | SYSTOLIC BLOOD PRESSURE: 139 MMHG | OXYGEN SATURATION: 97 % | TEMPERATURE: 98.2 F

## 2023-04-03 DIAGNOSIS — R06.02 SHORTNESS OF BREATH: Primary | ICD-10-CM

## 2023-04-03 DIAGNOSIS — F41.1 ANXIETY STATE: ICD-10-CM

## 2023-04-03 LAB
ALBUMIN SERPL-MCNC: 2.5 G/DL (ref 3.2–4.6)
ALBUMIN/GLOB SERPL: 0.4 (ref 0.4–1.6)
ALP SERPL-CCNC: 98 U/L (ref 50–136)
ALT SERPL-CCNC: 10 U/L (ref 12–65)
ANION GAP SERPL CALC-SCNC: 2 MMOL/L (ref 2–11)
AST SERPL-CCNC: 20 U/L (ref 15–37)
BASOPHILS # BLD: 0.1 K/UL (ref 0–0.2)
BASOPHILS NFR BLD: 1 % (ref 0–2)
BILIRUB SERPL-MCNC: 1.1 MG/DL (ref 0.2–1.1)
BUN SERPL-MCNC: 11 MG/DL (ref 8–23)
CALCIUM SERPL-MCNC: 9.2 MG/DL (ref 8.3–10.4)
CHLORIDE SERPL-SCNC: 98 MMOL/L (ref 101–110)
CO2 SERPL-SCNC: 28 MMOL/L (ref 21–32)
CREAT SERPL-MCNC: 0.8 MG/DL (ref 0.6–1)
DIFFERENTIAL METHOD BLD: ABNORMAL
EOSINOPHIL # BLD: 0 K/UL (ref 0–0.8)
EOSINOPHIL NFR BLD: 0 % (ref 0.5–7.8)
ERYTHROCYTE [DISTWIDTH] IN BLOOD BY AUTOMATED COUNT: 13.8 % (ref 11.9–14.6)
GLOBULIN SER CALC-MCNC: 6.2 G/DL (ref 2.8–4.5)
GLUCOSE SERPL-MCNC: 95 MG/DL (ref 65–100)
HCT VFR BLD AUTO: 41.8 % (ref 35.8–46.3)
HGB BLD-MCNC: 13.6 G/DL (ref 11.7–15.4)
IMM GRANULOCYTES # BLD AUTO: 0 K/UL (ref 0–0.5)
IMM GRANULOCYTES NFR BLD AUTO: 0 % (ref 0–5)
LACTATE SERPL-SCNC: 0.9 MMOL/L (ref 0.4–2)
LACTATE SERPL-SCNC: 2.3 MMOL/L (ref 0.4–2)
LYMPHOCYTES # BLD: 0.6 K/UL (ref 0.5–4.6)
LYMPHOCYTES NFR BLD: 8 % (ref 13–44)
MCH RBC QN AUTO: 30.8 PG (ref 26.1–32.9)
MCHC RBC AUTO-ENTMCNC: 32.5 G/DL (ref 31.4–35)
MCV RBC AUTO: 94.6 FL (ref 82–102)
MONOCYTES # BLD: 0.5 K/UL (ref 0.1–1.3)
MONOCYTES NFR BLD: 7 % (ref 4–12)
NEUTS SEG # BLD: 6.5 K/UL (ref 1.7–8.2)
NEUTS SEG NFR BLD: 84 % (ref 43–78)
NRBC # BLD: 0 K/UL (ref 0–0.2)
NT PRO BNP: 1190 PG/ML
PLATELET # BLD AUTO: 247 K/UL (ref 150–450)
PMV BLD AUTO: 10.9 FL (ref 9.4–12.3)
POTASSIUM SERPL-SCNC: 4.2 MMOL/L (ref 3.5–5.1)
PROCALCITONIN SERPL-MCNC: <0.05 NG/ML (ref 0–0.49)
PROT SERPL-MCNC: 8.7 G/DL (ref 6.3–8.2)
RBC # BLD AUTO: 4.42 M/UL (ref 4.05–5.2)
SARS-COV-2 RDRP RESP QL NAA+PROBE: NOT DETECTED
SODIUM SERPL-SCNC: 128 MMOL/L (ref 133–143)
SOURCE: NORMAL
TROPONIN I SERPL HS-MCNC: 9.7 PG/ML (ref 0–37)
WBC # BLD AUTO: 7.7 K/UL (ref 4.3–11.1)

## 2023-04-03 PROCEDURE — 84145 PROCALCITONIN (PCT): CPT

## 2023-04-03 PROCEDURE — 87635 SARS-COV-2 COVID-19 AMP PRB: CPT

## 2023-04-03 PROCEDURE — 83880 ASSAY OF NATRIURETIC PEPTIDE: CPT

## 2023-04-03 PROCEDURE — 84484 ASSAY OF TROPONIN QUANT: CPT

## 2023-04-03 PROCEDURE — 96374 THER/PROPH/DIAG INJ IV PUSH: CPT

## 2023-04-03 PROCEDURE — 83605 ASSAY OF LACTIC ACID: CPT

## 2023-04-03 PROCEDURE — 93005 ELECTROCARDIOGRAM TRACING: CPT | Performed by: EMERGENCY MEDICINE

## 2023-04-03 PROCEDURE — 6360000002 HC RX W HCPCS: Performed by: EMERGENCY MEDICINE

## 2023-04-03 PROCEDURE — 81001 URINALYSIS AUTO W/SCOPE: CPT

## 2023-04-03 PROCEDURE — 80053 COMPREHEN METABOLIC PANEL: CPT

## 2023-04-03 PROCEDURE — 71045 X-RAY EXAM CHEST 1 VIEW: CPT

## 2023-04-03 PROCEDURE — 2580000003 HC RX 258: Performed by: EMERGENCY MEDICINE

## 2023-04-03 PROCEDURE — 99285 EMERGENCY DEPT VISIT HI MDM: CPT

## 2023-04-03 PROCEDURE — 85025 COMPLETE CBC W/AUTO DIFF WBC: CPT

## 2023-04-03 RX ORDER — 0.9 % SODIUM CHLORIDE 0.9 %
1000 INTRAVENOUS SOLUTION INTRAVENOUS
Status: COMPLETED | OUTPATIENT
Start: 2023-04-03 | End: 2023-04-03

## 2023-04-03 RX ORDER — LORAZEPAM 2 MG/ML
0.5 INJECTION INTRAMUSCULAR ONCE
Status: COMPLETED | OUTPATIENT
Start: 2023-04-03 | End: 2023-04-03

## 2023-04-03 RX ORDER — 0.9 % SODIUM CHLORIDE 0.9 %
1000 INTRAVENOUS SOLUTION INTRAVENOUS
Status: DISCONTINUED | OUTPATIENT
Start: 2023-04-03 | End: 2023-04-03

## 2023-04-03 RX ORDER — 0.9 % SODIUM CHLORIDE 0.9 %
500 INTRAVENOUS SOLUTION INTRAVENOUS
Status: COMPLETED | OUTPATIENT
Start: 2023-04-03 | End: 2023-04-03

## 2023-04-03 RX ADMIN — LORAZEPAM 0.5 MG: 2 INJECTION INTRAMUSCULAR; INTRAVENOUS at 18:16

## 2023-04-03 RX ADMIN — SODIUM CHLORIDE 500 ML: 9 INJECTION, SOLUTION INTRAVENOUS at 18:16

## 2023-04-03 RX ADMIN — SODIUM CHLORIDE 1000 ML: 9 INJECTION, SOLUTION INTRAVENOUS at 21:27

## 2023-04-03 ASSESSMENT — PAIN SCALES - PAIN ASSESSMENT IN ADVANCED DEMENTIA (PAINAD)
CONSOLABILITY: 1
TOTALSCORE: 4
NEGVOCALIZATION: 1
BODYLANGUAGE: 0
BREATHING: 1
FACIALEXPRESSION: 1

## 2023-04-03 ASSESSMENT — ENCOUNTER SYMPTOMS
ABDOMINAL PAIN: 0
VOMITING: 0
COUGH: 0
SHORTNESS OF BREATH: 1

## 2023-04-03 NOTE — ED PROVIDER NOTES
<<-----Click on this checkbox to enter Pre-Op Dx
0.5 K/UL   Comprehensive Metabolic Panel   Result Value Ref Range    Sodium 128 (L) 133 - 143 mmol/L    Potassium 4.2 3.5 - 5.1 mmol/L    Chloride 98 (L) 101 - 110 mmol/L    CO2 28 21 - 32 mmol/L    Anion Gap 2 2 - 11 mmol/L    Glucose 95 65 - 100 mg/dL    BUN 11 8 - 23 MG/DL    Creatinine 0.80 0.6 - 1.0 MG/DL    Est, Glom Filt Rate >60 >60 ml/min/1.73m2    Calcium 9.2 8.3 - 10.4 MG/DL    Total Bilirubin 1.1 0.2 - 1.1 MG/DL    ALT 10 (L) 12 - 65 U/L    AST 20 15 - 37 U/L    Alk Phosphatase 98 50 - 136 U/L    Total Protein 8.7 (H) 6.3 - 8.2 g/dL    Albumin 2.5 (L) 3.2 - 4.6 g/dL    Globulin 6.2 (H) 2.8 - 4.5 g/dL    Albumin/Globulin Ratio 0.4 0.4 - 1.6     Lactic Acid   Result Value Ref Range    Lactic Acid, Plasma 2.3 (H) 0.4 - 2.0 MMOL/L   Procalcitonin   Result Value Ref Range    Procalcitonin <0.05 0.00 - 0.49 ng/mL   Brain Natriuretic Peptide   Result Value Ref Range    NT Pro-BNP 1,190 (H) <450 PG/ML   Lactic Acid   Result Value Ref Range    Lactic Acid, Plasma 0.9 0.4 - 2.0 MMOL/L   EKG 12 Lead   Result Value Ref Range    Ventricular Rate 90 BPM    Atrial Rate 90 BPM    P-R Interval 190 ms    QRS Duration 76 ms    Q-T Interval 364 ms    QTc Calculation (Bazett) 445 ms    P Axis 84 degrees    R Axis 62 degrees    T Axis -62 degrees    Diagnosis       Sinus rhythm with frequent Premature ventricular complexes  Septal infarct (cited on or before 20-MAY-2003)  Abnormal ECG  When compared with ECG of 28-JUN-2020 23:41,  Premature ventricular complexes are now Present          XR CHEST PORTABLE   Final Result      1. No definite acute finding. 2.  Moderate hiatal hernia again identified. Thank you for the referral of this patient. This exam was interpreted by an    American Board of Radiology certified radiologist with subspecialty fellowship    in [place] [address].  If there are questions about this or other reports you can    contact a radiologist directly at [phone]   You can also reach me directly

## 2023-04-03 NOTE — ED NOTES
Pt requires frequent redirection to explain situation and procedures, pt continues to be confused and repeats \"what are you doing to me\". Pt able to participate in rolling for linen and diaper change after explanation. Neto osorio MD Dial notified.  RN continues to explain situation into pt\"s microphone to headphone set>     Won Hale RN  04/03/23 [id number]

## 2023-04-03 NOTE — ED TRIAGE NOTES
Pt arrived via EMS from [address] [place] [address] c/o rapid breathing (30 breaths per min) and shortness of breath. Per EMS, pt's RR was 18-20 upon arrival. Pt has hx of anxiety per EMS. Hx of dementia. A&O x1.

## 2023-04-04 LAB
APPEARANCE UR: ABNORMAL
BACTERIA URNS QL MICRO: ABNORMAL /HPF
BILIRUB UR QL: NEGATIVE
CASTS URNS QL MICRO: 0 /LPF
COLOR UR: ABNORMAL
CRYSTALS URNS QL MICRO: 0 /LPF
EKG ATRIAL RATE: 90 BPM
EKG DIAGNOSIS: NORMAL
EKG P AXIS: 84 DEGREES
EKG P-R INTERVAL: 190 MS
EKG Q-T INTERVAL: 364 MS
EKG QRS DURATION: 76 MS
EKG QTC CALCULATION (BAZETT): 445 MS
EKG R AXIS: 62 DEGREES
EKG T AXIS: -62 DEGREES
EKG VENTRICULAR RATE: 90 BPM
EPI CELLS #/AREA URNS HPF: 0 /HPF
GLUCOSE UR STRIP.AUTO-MCNC: NEGATIVE MG/DL
HGB UR QL STRIP: ABNORMAL
KETONES UR QL STRIP.AUTO: NEGATIVE MG/DL
LEUKOCYTE ESTERASE UR QL STRIP.AUTO: ABNORMAL
MUCOUS THREADS URNS QL MICRO: ABNORMAL /LPF
NITRITE UR QL STRIP.AUTO: POSITIVE
OTHER OBSERVATIONS: ABNORMAL
PH UR STRIP: 8 (ref 5–9)
PROT UR STRIP-MCNC: 30 MG/DL
RBC #/AREA URNS HPF: ABNORMAL /HPF
SP GR UR REFRACTOMETRY: 1.01 (ref 1–1.02)
URINE CULTURE IF INDICATED: ABNORMAL
UROBILINOGEN UR QL STRIP.AUTO: 2 EU/DL (ref 0.2–1)
WBC URNS QL MICRO: ABNORMAL /HPF

## 2023-04-04 NOTE — ED NOTES
Called Anton Kaur to arrange transport to [facility].  ETA: 11:15 PM.     Judith Rodriges  04/03/23 [id number]

## 2023-04-04 NOTE — ED NOTES
Report given to RN at [facility]. Pt awaiting transport.      Whitney Palacios RN  04/03/23 1606

## 2025-06-11 NOTE — TELEPHONE ENCOUNTER
----- Message from Mali Wong MD sent at 10/15/2022 11:14 AM EDT -----  Notify labs are normal. F/U as needed. 2

## (undated) DEVICE — SUTURE VCRL SZ 3-0 L18IN ABSRB VLT L26MM SH 1/2 CIR J774D

## (undated) DEVICE — 1010 S-DRAPE TOWEL DRAPE 10/BX: Brand: STERI-DRAPE™

## (undated) DEVICE — SYR 10ML LUER LOK 1/5ML GRAD --

## (undated) DEVICE — SURGICAL PROCEDURE PACK MIN CV CDS

## (undated) DEVICE — SURGIFOAM SPNG SZ 100

## (undated) DEVICE — DRAPE SHT 3 QTR PROXIMA 53X77 --

## (undated) DEVICE — NEEDLE SPNL 22GA L3.5IN BLK HUB S STL REG WALL FIT STYL W/

## (undated) DEVICE — KENDALL SCD EXPRESS SLEEVES, KNEE LENGTH, MEDIUM: Brand: KENDALL SCD

## (undated) DEVICE — SOLUTION IV 1000ML 0.9% SOD CHL

## (undated) DEVICE — PENCIL ES L12IN BAYNT MPLR DISP BOVIE

## (undated) DEVICE — BAND RUB 1/8X2.5IN STRL --

## (undated) DEVICE — INTENDED FOR TISSUE SEPARATION, AND OTHER PROCEDURES THAT REQUIRE A SHARP SURGICAL BLADE TO PUNCTURE OR CUT.: Brand: BARD-PARKER SAFETY BLADES SIZE 15, STERILE

## (undated) DEVICE — 2000CC GUARDIAN II: Brand: GUARDIAN

## (undated) DEVICE — WAX SURG 2.5GM HEMSTAT BNE BEESWAX PARAFFIN ISO PALMITATE

## (undated) DEVICE — DERMABOND SKIN ADH 0.7ML -- DERMABOND ADVANCED 12/BX

## (undated) DEVICE — REM POLYHESIVE ADULT PATIENT RETURN ELECTRODE: Brand: VALLEYLAB

## (undated) DEVICE — (D)PREP SKN CHLRAPRP APPL 26ML -- CONVERT TO ITEM 371833

## (undated) DEVICE — DRAPE MICSCP W51XL150IN FOR LEICA M680 WILD OHS

## (undated) DEVICE — TOOL T12MH25 LEGEND 12CM 2.5MM MH: Brand: MIDAS REX ™